# Patient Record
Sex: FEMALE | Race: BLACK OR AFRICAN AMERICAN | NOT HISPANIC OR LATINO | Employment: FULL TIME | URBAN - METROPOLITAN AREA
[De-identification: names, ages, dates, MRNs, and addresses within clinical notes are randomized per-mention and may not be internally consistent; named-entity substitution may affect disease eponyms.]

---

## 2018-10-31 ENCOUNTER — TRANSCRIBE ORDERS (OUTPATIENT)
Dept: ADMINISTRATIVE | Age: 28
End: 2018-10-31

## 2018-10-31 ENCOUNTER — APPOINTMENT (OUTPATIENT)
Dept: RADIOLOGY | Age: 28
End: 2018-10-31
Attending: PREVENTIVE MEDICINE

## 2018-10-31 DIAGNOSIS — R76.11 PPD POSITIVE: ICD-10-CM

## 2018-10-31 DIAGNOSIS — R76.11 PPD POSITIVE: Primary | ICD-10-CM

## 2018-10-31 PROCEDURE — 71046 X-RAY EXAM CHEST 2 VIEWS: CPT

## 2018-11-27 ENCOUNTER — ULTRASOUND (OUTPATIENT)
Dept: OBGYN CLINIC | Facility: CLINIC | Age: 28
End: 2018-11-27
Payer: COMMERCIAL

## 2018-11-27 VITALS
SYSTOLIC BLOOD PRESSURE: 128 MMHG | WEIGHT: 178 LBS | HEART RATE: 76 BPM | BODY MASS INDEX: 26.98 KG/M2 | HEIGHT: 68 IN | DIASTOLIC BLOOD PRESSURE: 79 MMHG

## 2018-11-27 DIAGNOSIS — N91.2 AMENORRHEA: ICD-10-CM

## 2018-11-27 DIAGNOSIS — Z32.01 POSITIVE PREGNANCY TEST: Primary | ICD-10-CM

## 2018-11-27 DIAGNOSIS — R11.0 NAUSEA WITHOUT VOMITING: ICD-10-CM

## 2018-11-27 DIAGNOSIS — Z34.91 PRENATAL CARE IN FIRST TRIMESTER: ICD-10-CM

## 2018-11-27 DIAGNOSIS — Z3A.01 7 WEEKS GESTATION OF PREGNANCY: ICD-10-CM

## 2018-11-27 PROCEDURE — 99213 OFFICE O/P EST LOW 20 MIN: CPT | Performed by: OBSTETRICS & GYNECOLOGY

## 2018-11-27 PROCEDURE — 76830 TRANSVAGINAL US NON-OB: CPT | Performed by: OBSTETRICS & GYNECOLOGY

## 2018-11-27 RX ORDER — SWAB
1 SWAB, NON-MEDICATED MISCELLANEOUS DAILY
Qty: 30 EACH | Refills: 4 | Status: SHIPPED | OUTPATIENT
Start: 2018-11-27 | End: 2019-06-13 | Stop reason: SDUPTHER

## 2018-11-27 RX ORDER — DIPHENHYDRAMINE HYDROCHLORIDE 25 MG/1
25 CAPSULE ORAL DAILY
Qty: 30 TABLET | Refills: 0 | Status: SHIPPED | OUTPATIENT
Start: 2018-11-27 | End: 2019-02-27 | Stop reason: ALTCHOICE

## 2018-11-27 NOTE — PROGRESS NOTES
AMB US Pelvic Non OB  Date/Time: 2018 8:38 AM  Performed by: Dariel Heimlich  Authorized by: Dariel Heimlich     Procedure details:     Indications comment:  Amenorrhea and positive UPT    Technique:  Transvaginal US, Non-OB    Position: lithotomy exam    Additional Procedure Comments:      Pt is a 33yo  with LMP 10/6/18, giving EGA of 7w3d, presenting with c/o amenorrhea and positive UPT  Pt reports history of regular periods  This was an intended pregnancy  Pt denies any vaginal bleeding, loss of fluid, cramping  Reports nausea and breast tenderness, but no vomiting  She is not taking any medications as she was unsure what is safe OTC  She is originally from [facility] [place] has been living in the [address] [place] [address] for 7 years  She plans to return there in January for a visit  She currently works as an   TVUS demonstrated a viable, koenig IUP with CRL measuring 14 2mm, EGA 7w5d    ROSIE consistent with LMP  ROSIE 19      1) RTO for nursing intake visit  2) Start Prenatal vitamins, given RX  3) Rx given for Unison and Vitamin B 6 for nausea  4) Reviewed signs and symptoms to call     Soila Choudhury MD  OBGYN, PGY-2  2018 9:17 AM

## 2018-12-03 ENCOUNTER — INITIAL PRENATAL (OUTPATIENT)
Dept: OBGYN CLINIC | Facility: CLINIC | Age: 28
End: 2018-12-03

## 2018-12-03 VITALS
HEIGHT: 67 IN | WEIGHT: 169.6 LBS | HEART RATE: 76 BPM | DIASTOLIC BLOOD PRESSURE: 76 MMHG | BODY MASS INDEX: 26.62 KG/M2 | SYSTOLIC BLOOD PRESSURE: 122 MMHG

## 2018-12-03 DIAGNOSIS — Z3A.08 8 WEEKS GESTATION OF PREGNANCY: Primary | ICD-10-CM

## 2018-12-03 PROCEDURE — OBC

## 2018-12-03 NOTE — PATIENT INSTRUCTIONS
Pregnancy   WHAT YOU NEED TO KNOW:   What do I need to know about pregnancy? A normal pregnancy lasts about 40 weeks  The first trimester lasts from your last period through the 12th week of pregnancy  The second trimester lasts from the 13th week of your pregnancy through the 23rd week  The third trimester lasts from your 24th week of pregnancy until your baby is born  If you know the date of your last period, your healthcare provider can estimate your due date  You may give birth to your baby any time from 37 weeks to 2 weeks after your due date  What is prenatal care? Prenatal care is a series of visits with your healthcare provider throughout your pregnancy  Prenatal care can help prevent problems during pregnancy and childbirth  At each prenatal visit, your healthcare provider will weigh you and check your blood pressure  Your healthcare provider will also check your baby's heartbeat and growth  You may also need the following at some visits:  · A pelvic exam  allows your healthcare provider to see your cervix (the bottom part of your uterus)  Your healthcare provider uses a speculum to gently open your vagina  He will check the size and shape of your uterus  · Blood tests  may be done to check for gestational diabetes and anemia (low iron level)  You may need other blood tests, such as blood type, Rh factor, or tests to check for birth defects  · A fetal ultrasound  shows pictures of your baby inside your uterus  It shows your baby's development  The movement and position of your baby can also be seen  Your healthcare provider may be able to tell you what your baby's gender is during the ultrasound  What can I do to have a healthy pregnancy? · Eat a variety of healthy foods  Healthy foods include fruits, vegetables, whole-grain breads, low-fat dairy foods, beans, lean meats, and fish  Drink liquids as directed  Ask how much liquid to drink each day and which liquids are best for you   Limit caffeine to less than 200 milligrams each day  Limit your intake of fish to 2 servings each week  Choose fish low in mercury such as canned light tuna, shrimp, crab, salmon, cod, or tilapia  Do not  eat fish high in mercury such as swordfish, tilefish, katie mackerel, and shark  · Take prenatal vitamins as directed  Your need for certain vitamins and minerals, such as folic acid, increases during pregnancy  Prenatal vitamins provide some of the extra vitamins and minerals you need  Prenatal vitamins may also help to decrease the risk of certain birth defects  · Ask how much weight you should gain during your pregnancy  Too much or too little weight gain can be unhealthy for you and your baby  · Talk to your healthcare provider about exercise  Moderate exercise can help you stay fit  Your healthcare provider will help you plan an exercise program that is safe for you during pregnancy  · Do not smoke  If you smoke, it is never too late to quit  Smoking increases your risk of a miscarriage and other health problems during your pregnancy  Smoking can cause your baby to be born too early or weigh less at birth  Ask your healthcare provider for information if you need help quitting  · Do not drink alcohol  Alcohol passes from your body to your baby through the placenta  It can affect your baby's brain development and cause fetal alcohol syndrome (FAS)  FAS is a group of conditions that causes mental, behavior, and growth problems  · Talk to your healthcare provider before you take any medicines  Many medicines may harm your baby if you take them when you are pregnant  Do not take any medicines, vitamins, herbs, or supplements without first talking to your healthcare provider  Never use illegal or street drugs (such as marijuana or cocaine) while you are pregnant  What body changes may happen during my pregnancy?    · Breast changes  you will experience include tenderness and tingling during the early part of your pregnancy  Your breasts will become larger  You may need to use a support bra  You may see a thin, yellow fluid, called colostrum, leak from your nipples during the second trimester  Colostrum is a liquid that changes to milk about 3 days after you give birth  · Skin changes and stretch marks  may occur during your pregnancy  You may have red marks, called stretch marks, on your skin  Stretch marks will usually fade after pregnancy  Use lotion if your skin is dry and itchy  The skin on your face, around your nipples, and below your belly button may darken  Most of the time, your skin will return to its normal color after your baby is born  · Morning sickness  is nausea and vomiting that can happen at any time of day  Avoid fatty and spicy foods  Eat small meals throughout the day instead of large meals  Marizol may help to decrease nausea  Ask your healthcare provider about other ways of decreasing nausea and vomiting  · Heartburn  may be caused by changes in your hormones during pregnancy  Your growing uterus may also push your stomach upward and force stomach acid to back up into your esophagus  Eat 4 or 5 small meals each day instead of large meals  Avoid spicy foods  Avoid eating right before bedtime  · Constipation  may develop during your pregnancy  To treat constipation, eat foods high in fiber such as fiber cereals, beans, fruits, vegetables, whole-grain breads, and prune juice  Get regular exercise and drink plenty of water  Your healthcare provider may also suggest a fiber supplement to soften your bowel movements  Talk to your healthcare provider before you use any medicines to decrease constipation  · Hemorrhoids  are enlarged veins in the rectal area  They may cause pain, itching, and bright red bleeding from your rectum  To decrease your risk of hemorrhoids, prevent constipation and do not strain to have a bowel movement   If you have hemorrhoids, soak in a tub of warm water to ease discomfort  Ask your healthcare provider how you can treat hemorrhoids  · Leg cramps and swelling  may be caused by low calcium levels or the added weight of pregnancy  Raise your legs above the level of your heart to decrease swelling  During a leg cramp, stretch or massage the muscle that has the cramp  Heat may help decrease pain and muscle spasms  Apply heat on your muscle for 20 to 30 minutes every 2 hours for as many days as directed  · Back pain  may occur as your baby grows  Do not stand for long periods of time or lift heavy items  Use good posture while you stand, squat, or bend  Wear low-heeled shoes with good support  Rest may also help to relieve back pain  Ask your healthcare provider about exercises you can do to strengthen your back muscles  What are some safety tips during pregnancy? · Avoid hot tubs and saunas  Do not use a hot tub or sauna while you are pregnant, especially during your first trimester  Hot tubs and saunas may raise your baby's temperature and increase the risk of birth defects  · Avoid toxoplasmosis  This is an infection caused by eating raw meat or being around infected cat feces  It can cause birth defects, miscarriages, and other problems  Wash your hands after you touch raw meat  Make sure any meat is well-cooked before you eat it  Avoid raw eggs and unpasteurized milk  Use gloves or ask someone else to clean your cat's litter box while you are pregnant  · Ask your healthcare provider about travel  The most comfortable time to travel is during the second trimester  Ask your healthcare provider if you can travel after 36 weeks  You may not be able to travel in an airplane after 36 weeks  He may also recommend that you avoid long road trips  When should I seek immediate care? · You develop a severe headache that does not go away  · You have new or increased vision changes, such as blurred or spotted vision      · You have new or increased swelling in your face or hands  · You have pain or cramping in your abdomen or low back  · You have vaginal bleeding  When should I contact my healthcare provider? · You have abdominal cramps, pressure, or tightening  · You have a change in vaginal discharge  · You cannot keep food or drinks down, and you are losing weight  · You have chills or a fever  · You have vaginal itching, burning, or pain  · You have yellow, green, white, or foul-smelling vaginal discharge  · You have pain or burning when you urinate, less urine than usual, or pink or bloody urine  · You have questions or concerns about your condition or care  CARE AGREEMENT:   You have the right to help plan your care  Learn about your health condition and how it may be treated  Discuss treatment options with your caregivers to decide what care you want to receive  You always have the right to refuse treatment  The above information is an  only  It is not intended as medical advice for individual conditions or treatments  Talk to your doctor, nurse or pharmacist before following any medical regimen to see if it is safe and effective for you  © 2017 [employer]  Information is for End User's use only and may not be sold, redistributed or otherwise used for commercial purposes  All illustrations and images included in CareNotes® are the copyrighted property of [employer]  or Henry Granado

## 2018-12-03 NOTE — PROGRESS NOTES
OB Intake  o Patient presents for OB intake interview  o Accompanied by: Self    o     o Hx of  delivery prior to 36 weeks 6 days:   no  o LMP:   Patient's last menstrual period was 10/06/2018   o U/S date: 18   - 7 weeks  5 days  o Estimated Date of Delivery: None noted  - confirmed by U/S     o Signs and Symptoms of pregnancy:  nausea  - Constipation:   no  - Headaches:   no  - Cramping/spotting:   no  - PICA cravings:   no  - Diabetes: If you answer yes, please order 1 hr gtt testing, 50grams   History of gestational diabetes   no   BMI >35    no   Advance maternal age >35   no   First degree relative with type 2 diabetes   yes   History of PCOS   no   Current metformin use   no   Prior history of macrosomia or LGA   no  o Immunization Record  -   - There is no immunization history on file for this patient   o Tdap:  - Counseled to be given after 28 weeks  o Influenza vaccine discussed   Vaccinated:   No  o MRSA questionnaire:   negative  o Dental visit within last 6 months  - yes   - If no, recommendations discussed  Interview education:  Darlis Goldmann Pregnancy Essentials booklet given to patient  Reviewed and explained   Handouts given at todays visit  o Caleb Gonzalez & me phone application guide  o [facility] support center  o CDCs Response to [address] [place] [address] Maternal Fetal Medicine  - Sequential screening pamphlet  - Cystic fibrosis pamphlet  o ROSIE letter given    - [address]  - Work   - Dentist    [facility]:   No  ONAF form submitted:   No    Scripts given for prenatal panel, hemoglobin electrophoresis, 1 hour glucose (father diabetic) varicella    Undecided on genetic testing    Patient to get influenza vaccine at PCP or pharmacy    Interview done by: Asuncion De Santiago RN 18

## 2018-12-12 ENCOUNTER — APPOINTMENT (OUTPATIENT)
Dept: LAB | Facility: HOSPITAL | Age: 28
End: 2018-12-12
Payer: COMMERCIAL

## 2018-12-12 DIAGNOSIS — Z3A.08 8 WEEKS GESTATION OF PREGNANCY: ICD-10-CM

## 2018-12-12 LAB
ABO GROUP BLD: NORMAL
BACTERIA UR QL AUTO: ABNORMAL /HPF
BASOPHILS # BLD AUTO: 0.02 THOUSANDS/ΜL (ref 0–0.1)
BASOPHILS NFR BLD AUTO: 0 % (ref 0–1)
BILIRUB UR QL STRIP: NEGATIVE
BLD GP AB SCN SERPL QL: NEGATIVE
CLARITY UR: ABNORMAL
COLOR UR: YELLOW
EOSINOPHIL # BLD AUTO: 0.05 THOUSAND/ΜL (ref 0–0.61)
EOSINOPHIL NFR BLD AUTO: 1 % (ref 0–6)
ERYTHROCYTE [DISTWIDTH] IN BLOOD BY AUTOMATED COUNT: 12.4 % (ref 11.6–15.1)
GLUCOSE UR STRIP-MCNC: NEGATIVE MG/DL
HBV SURFACE AG SER QL: NORMAL
HCT VFR BLD AUTO: 39.9 % (ref 34.8–46.1)
HGB BLD-MCNC: 12.9 G/DL (ref 11.5–15.4)
HGB UR QL STRIP.AUTO: NEGATIVE
IMM GRANULOCYTES # BLD AUTO: 0.02 THOUSAND/UL (ref 0–0.2)
IMM GRANULOCYTES NFR BLD AUTO: 0 % (ref 0–2)
KETONES UR STRIP-MCNC: NEGATIVE MG/DL
LEUKOCYTE ESTERASE UR QL STRIP: ABNORMAL
LYMPHOCYTES # BLD AUTO: 1.38 THOUSANDS/ΜL (ref 0.6–4.47)
LYMPHOCYTES NFR BLD AUTO: 25 % (ref 14–44)
MCH RBC QN AUTO: 28.2 PG (ref 26.8–34.3)
MCHC RBC AUTO-ENTMCNC: 32.3 G/DL (ref 31.4–37.4)
MCV RBC AUTO: 87 FL (ref 82–98)
MONOCYTES # BLD AUTO: 0.36 THOUSAND/ΜL (ref 0.17–1.22)
MONOCYTES NFR BLD AUTO: 7 % (ref 4–12)
NEUTROPHILS # BLD AUTO: 3.73 THOUSANDS/ΜL (ref 1.85–7.62)
NEUTS SEG NFR BLD AUTO: 67 % (ref 43–75)
NITRITE UR QL STRIP: NEGATIVE
NON-SQ EPI CELLS URNS QL MICRO: ABNORMAL /HPF
NRBC BLD AUTO-RTO: 0 /100 WBCS
PH UR STRIP.AUTO: 5.5 [PH] (ref 4.5–8)
PLATELET # BLD AUTO: 182 THOUSANDS/UL (ref 149–390)
PMV BLD AUTO: 10.7 FL (ref 8.9–12.7)
PROT UR STRIP-MCNC: NEGATIVE MG/DL
RBC # BLD AUTO: 4.58 MILLION/UL (ref 3.81–5.12)
RBC #/AREA URNS AUTO: ABNORMAL /HPF
RH BLD: POSITIVE
RUBV IGG SERPL IA-ACNC: >175 IU/ML
SP GR UR STRIP.AUTO: 1.02 (ref 1–1.03)
SPECIMEN EXPIRATION DATE: NORMAL
UROBILINOGEN UR QL STRIP.AUTO: 0.2 E.U./DL
WBC # BLD AUTO: 5.56 THOUSAND/UL (ref 4.31–10.16)
WBC #/AREA URNS AUTO: ABNORMAL /HPF

## 2018-12-12 PROCEDURE — 87086 URINE CULTURE/COLONY COUNT: CPT

## 2018-12-12 PROCEDURE — 86787 VARICELLA-ZOSTER ANTIBODY: CPT

## 2018-12-12 PROCEDURE — 36415 COLL VENOUS BLD VENIPUNCTURE: CPT

## 2018-12-12 PROCEDURE — 80081 OBSTETRIC PANEL INC HIV TSTG: CPT

## 2018-12-12 PROCEDURE — 81001 URINALYSIS AUTO W/SCOPE: CPT

## 2018-12-12 PROCEDURE — 83020 HEMOGLOBIN ELECTROPHORESIS: CPT

## 2018-12-13 LAB
BACTERIA UR CULT: NORMAL
HIV 1+2 AB+HIV1 P24 AG SERPL QL IA: NORMAL
RPR SER QL: NORMAL
VZV IGG SER IA-ACNC: NORMAL

## 2018-12-14 LAB
DEPRECATED HGB OTHER BLD-IMP: 0 %
HGB A MFR BLD: 2.7 % (ref 1.8–3.2)
HGB A MFR BLD: 96.8 % (ref 96.4–98.8)
HGB C MFR BLD: 0 %
HGB F MFR BLD: 0.5 % (ref 0–2)
HGB FRACT BLD-IMP: NORMAL
HGB S BLD QL SOLY: NEGATIVE
HGB S MFR BLD: 0 %

## 2018-12-15 ENCOUNTER — TRANSCRIBE ORDERS (OUTPATIENT)
Dept: ADMINISTRATIVE | Facility: HOSPITAL | Age: 28
End: 2018-12-15

## 2018-12-15 LAB — GLUCOSE 1H P 50 G GLC PO SERPL-MCNC: 99 MG/DL

## 2018-12-15 PROCEDURE — 36415 COLL VENOUS BLD VENIPUNCTURE: CPT

## 2018-12-15 PROCEDURE — 82950 GLUCOSE TEST: CPT

## 2018-12-18 ENCOUNTER — ROUTINE PRENATAL (OUTPATIENT)
Dept: OBGYN CLINIC | Facility: CLINIC | Age: 28
End: 2018-12-18
Payer: COMMERCIAL

## 2018-12-18 VITALS — DIASTOLIC BLOOD PRESSURE: 75 MMHG | WEIGHT: 170 LBS | SYSTOLIC BLOOD PRESSURE: 110 MMHG | BODY MASS INDEX: 26.63 KG/M2

## 2018-12-18 DIAGNOSIS — Z34.01 ENCOUNTER FOR PRENATAL CARE IN FIRST TRIMESTER OF FIRST PREGNANCY: Primary | ICD-10-CM

## 2018-12-18 PROCEDURE — PNV: Performed by: STUDENT IN AN ORGANIZED HEALTH CARE EDUCATION/TRAINING PROGRAM

## 2018-12-18 PROCEDURE — 87591 N.GONORRHOEAE DNA AMP PROB: CPT | Performed by: STUDENT IN AN ORGANIZED HEALTH CARE EDUCATION/TRAINING PROGRAM

## 2018-12-18 PROCEDURE — 87491 CHLMYD TRACH DNA AMP PROBE: CPT | Performed by: STUDENT IN AN ORGANIZED HEALTH CARE EDUCATION/TRAINING PROGRAM

## 2018-12-18 NOTE — PATIENT INSTRUCTIONS
Pregnancy at [address] to [address]:   What changes are happening in my body? Pregnancy hormones may cause your body to go through many changes during this stage of your pregnancy  You may have any of the following signs and symptoms:  · Fatigue    · Tender, swollen breasts    · Nausea or vomiting (morning sickness)    · Mood swings    · Frequent urination     · Headache    · Heartburn or constipation    · Weight gain or loss    · Cravings for certain foods or dislike of foods you normally eat  How do I care for myself at this stage of my pregnancy? · Manage nausea and vomiting  Avoid fatty and spicy foods  Eat small meals throughout the day instead of large meals  Marizol may help to decrease nausea  Ask your healthcare provider about other ways of decreasing nausea and vomiting  · Eat a variety of healthy foods  Healthy foods include fruits, vegetables, whole-grain breads, low-fat dairy foods, beans, lean meats, and fish  Drink liquids as directed  Ask how much liquid to drink each day and which liquids are best for you  Limit caffeine to less than 200 milligrams each day  Limit your intake of fish to 2 servings each week  Choose fish low in mercury such as canned light tuna, shrimp, salmon, cod, or tilapia  Do not  eat fish high in mercury such as swordfish, tilefish, katie mackerel, and shark  · Take prenatal vitamins as directed  Your need for certain vitamins and minerals, such as folic acid, increases during pregnancy  Prenatal vitamins provide some of the extra vitamins and minerals you need  Prenatal vitamins may also help to decrease the risk of certain birth defects  · Ask how much weight you should gain each month  Too much or too little weight gain can be unhealthy for you and your baby  · Do not smoke  If you smoke, it is never too late to quit  Smoking increases your risk of a miscarriage and other health problems during your pregnancy   Smoking can cause your baby to be born too early or weigh less at birth  Ask your healthcare provider for information if you need help quitting  · Do not drink alcohol  Alcohol passes from your body to your baby through the placenta  It can affect your baby's brain development and cause fetal alcohol syndrome (FAS)  FAS is a group of conditions that causes mental, behavior, and growth problems  · Talk to your healthcare provider before you take any medicines  Many medicines may harm your baby if you take them when you are pregnant  Do not take any medicines, vitamins, herbs, or supplements without first talking to your healthcare provider  Never use illegal or street drugs (such as marijuana or cocaine) while you are pregnant  What are some safety tips during pregnancy? · Avoid hot tubs and saunas  Do not use a hot tub or sauna while you are pregnant, especially during your first trimester  Hot tubs and saunas may raise your baby's temperature and increase the risk of birth defects  · Avoid toxoplasmosis  This is an infection caused by eating raw meat or being around infected cat feces  It can cause birth defects, miscarriages, and other problems  Wash your hands after you touch raw meat  Make sure any meat is well-cooked before you eat it  Avoid raw eggs and unpasteurized milk  Use gloves or ask someone else to clean your cat's litter box while you are pregnant  What changes are happening with my baby? By 10 weeks, your baby will be about 2 ½ inches long from the top of the head to the rump (baby's bottom)  Your baby weighs about ½ ounce  Major body organs, such as the brain, heart, and lungs, are forming  Your baby's facial features are also starting to form  What do I need to know about prenatal care? Prenatal care is a series of visits with your healthcare provider throughout your pregnancy  During the first 28 weeks of your pregnancy, you will see your healthcare provider once a month   Prenatal care can help prevent problems during pregnancy and childbirth  Your healthcare provider will ask questions about your health and any previous pregnancies you have had  He will also ask about any medicines you are taking  You may also need any of the following:  · A pap smear  will be done to check your cervix for abnormal cells  The cervix is the narrow opening at the bottom of your uterus  The cervix meets the top part of the vagina  · A pelvic exam  allows your healthcare provider to see your cervix (the bottom part of your uterus)  Your healthcare provider uses a speculum to gently open your vagina  He will check the size and shape of your uterus  · Blood tests  may be done to check for anemia or blood type  Your healthcare provider may also order other blood tests to check if you are immune to certain diseases such as Hepatitis B  He may also recommend an HIV test     · Urine tests  may also be done to check for signs of infection  · Your blood pressure and weight  will be checked  When should I seek immediate care? · You have pain or cramping in your abdomen or low back  · You have heavy vaginal bleeding or clotting  · You pass material that looks like tissue or large clots  Collect the material and bring it with you  When should I contact my healthcare provider? · You have light bleeding  · You have chills or a fever  · You have vaginal itching, burning, or pain  · You have yellow, green, white, or foul-smelling vaginal discharge  · You have pain or burning when you urinate, less urine than usual, or pink or bloody urine  · You have questions or concerns about your condition or care  CARE AGREEMENT:   You have the right to help plan your care  Learn about your health condition and how it may be treated  Discuss treatment options with your caregivers to decide what care you want to receive  You always have the right to refuse treatment  The above information is an  only   It is not intended as medical advice for individual conditions or treatments  Talk to your doctor, nurse or pharmacist before following any medical regimen to see if it is safe and effective for you  © 2017 [employer] Information is for End User's use only and may not be sold, redistributed or otherwise used for commercial purposes  All illustrations and images included in CareNotes® are the copyrighted property of A D A M , Inc  or Henry Granado

## 2018-12-18 NOTE — PROGRESS NOTES
OB/GYN  PRENATAL H&P VISIT  Joselo Huerta  12/18/2018  9:20 AM  Dr Gianna Cristina MD      SUBJECTIVE  Patient is here for initial prenatal H&P  This is an intended pregnancy  Patient reports that her LMP was 10/6/18  Patient is currently doing well  She is here by herself  This is her first pregnancy  She currently works as an   She has a positive support system at home  She denies hx of STD/STI, denies a hx of TB although she has had BCG vaccination and positive PPD with negative CXR in the last year, and denies close contacts with persons with TB  She denies a family history of inheritable conditions such as physical or intellectual disabilities, birth defects, blood disorders, heart or neural tube defects  She denies a history of had MRSA  She has had no recent travel but has travel planned in January to [facility] ([place]) and was counseled on appropriate travel vaccinations  She denies use of nicotine or recreational drug use  She denies vaginal bleeding, cramping, leakage, abnormal discharge  She states her nausea has improved with her B vitamins and Unison  She has complaints of reflux symptoms today  Review of Systems   Constitutional: Negative  HENT: Negative  Eyes: Negative  Respiratory: Negative  Cardiovascular: Negative  Gastrointestinal: Positive for nausea  Reflux   Endocrine: Negative  Genitourinary: Negative  Musculoskeletal: Negative  Skin: Negative  Allergic/Immunologic: Negative  Neurological: Negative  Psychiatric/Behavioral: Negative  Past Medical History:   Diagnosis Date    Varicella        Past Surgical History:   Procedure Laterality Date    WISDOM TOOTH EXTRACTION         Social History     Social History    Marital status: Single     Spouse name: N/A    Number of children: N/A    Years of education: N/A     Occupational History    Not on file       Social History Main Topics    Smoking status: Never Smoker    Smokeless tobacco: Never Used    Alcohol use No    Drug use: No    Sexual activity: Yes     Partners: Male     Other Topics Concern    Not on file     Social History Narrative    No narrative on file       OBJECTIVE  Vitals:    18 0900   BP: 110/75     Physical Exam   Constitutional: She is oriented to person, place, and time  She appears well-developed and well-nourished  No distress  Genitourinary:   Genitourinary Comments: See  exam below   HENT:   Head: Normocephalic and atraumatic  Neck: No tracheal deviation present  Cardiovascular: Normal rate, regular rhythm, normal heart sounds and intact distal pulses  Exam reveals no gallop and no friction rub  No murmur heard  Pulmonary/Chest: Effort normal and breath sounds normal  No stridor  No respiratory distress  She has no wheezes  She has no rales  Right breast exhibits no inverted nipple, no mass, no nipple discharge, no skin change and no tenderness  Left breast exhibits no inverted nipple, no mass, no nipple discharge, no skin change and no tenderness  Abdominal: Soft  Bowel sounds are normal  She exhibits no distension and no mass  There is no tenderness  There is no rebound and no guarding  No hernia  Musculoskeletal: She exhibits no edema, tenderness or deformity  Lymphadenopathy:     She has no cervical adenopathy  Neurological: She is alert and oriented to person, place, and time  Skin: Skin is warm and dry  No rash noted  She is not diaphoretic  No erythema  No pallor  Psychiatric: She has a normal mood and affect       Vulva: normal, no lesions noted  Vagina: normal mucosa, normal discharge  Cervix: with mild etrusion and cervical mucus present  Adnexa: no mass, fullness, tenderness  Uterus: size consistent with 10 weeks      LABS:  HIV nonreactive  HepB Ag negative  CBC WNL  Rubella immune  RPR nonreactive  B positive, antibody negative  1 hr GTT 99 (normal)      ASSESSMENT AND PLAN    29 y o , , with /75   Wt 77 1 kg (170 lb)   LMP 10/06/2018 , at Gestational Age: <None> here for her prenatal H&P       1  Pregnancy: H&P completed today  PN Labs reviewed today  Final dating via LMP and TVUS  TVUS showed IUP consistent with dates  Labor expectations discussed with patient, including appointment schedule, nutrition, weight gain, exercise, medications, sexual intercourse, and nausea/vomiting  2  Screening: Pap smear last in 2018 at [facility] that was NILM, HPV positive  GC/CT collected  3  Consents: Delivery process including potential OVD and  reviewed  Consents signed today  4  Postpartum: Patient plans on breastfeeding  Different methods of contraception were discussed with patient, including progesterone only oral pills, depo provera, nexplanon, mirena, and paragard  Patient would like to use Paraguard and is unsure on her bridge during postpartum phase  6  Follow up: RTC in 4 weeks  Precautions regarding labor, leakage, bleeding, and fetal movement reviewed        D/w Dr Jan Terrazas MD  2018  9:20 AM

## 2018-12-19 LAB
C TRACH DNA SPEC QL NAA+PROBE: NEGATIVE
N GONORRHOEA DNA SPEC QL NAA+PROBE: NEGATIVE

## 2019-01-30 ENCOUNTER — ROUTINE PRENATAL (OUTPATIENT)
Dept: OBGYN CLINIC | Facility: CLINIC | Age: 29
End: 2019-01-30

## 2019-01-30 VITALS
WEIGHT: 168 LBS | BODY MASS INDEX: 25.46 KG/M2 | DIASTOLIC BLOOD PRESSURE: 73 MMHG | HEART RATE: 90 BPM | SYSTOLIC BLOOD PRESSURE: 123 MMHG | HEIGHT: 68 IN

## 2019-01-30 DIAGNOSIS — Z3A.16 16 WEEKS GESTATION OF PREGNANCY: Primary | ICD-10-CM

## 2019-01-30 DIAGNOSIS — Z34.92 PRENATAL CARE IN SECOND TRIMESTER: ICD-10-CM

## 2019-01-30 PROCEDURE — PNV: Performed by: NURSE PRACTITIONER

## 2019-01-30 NOTE — PROGRESS NOTES
Assessment & Plan  29 y o   at 16w4d presenting for routine prenatal visit  Missed [place] [facility]  Declined genetic testing  Given slip for Level II U/S  Problem List Items Addressed This Visit        Other    16 weeks gestation of pregnancy - Primary    Relevant Orders    Ambulatory Referral to Maternal Fetal Medicine    Prenatal care in second trimester    Relevant Orders    Ambulatory Referral to Maternal Fetal Medicine        ____________________________________________________________  Subjective  She is without complaint  She denies contractions, loss of fluid, or vaginal bleeding  She does not feels regular fetal movements  Objective  /73   Pulse 90   Ht 5' 8" (1 727 m)   Wt 76 2 kg (168 lb)   LMP 10/06/2018 (Exact Date)   BMI 25 54 kg/m²   FHR: 160     Patient's Active Problem List  Patient Active Problem List   Diagnosis    16 weeks gestation of pregnancy    Prenatal care in second trimester     Plan  Schedule appointment for Level Ii U/S  Call with vaginal bleeding, loss of fluid, regular contractions,  other needs or concerns  Return in 4 weeks  Pt verbalized understanding of all discussed

## 2019-01-30 NOTE — PATIENT INSTRUCTIONS
Schedule appointment for Level Ii U/S  Call with vaginal bleeding, loss of fluid, regular contractions,  other needs or concerns    Return in 4 weeks

## 2019-02-13 ENCOUNTER — ROUTINE PRENATAL (OUTPATIENT)
Dept: OBGYN CLINIC | Facility: CLINIC | Age: 29
End: 2019-02-13

## 2019-02-13 VITALS — DIASTOLIC BLOOD PRESSURE: 73 MMHG | BODY MASS INDEX: 26.37 KG/M2 | SYSTOLIC BLOOD PRESSURE: 120 MMHG | WEIGHT: 173.4 LBS

## 2019-02-13 DIAGNOSIS — Z3A.18 18 WEEKS GESTATION OF PREGNANCY: Primary | ICD-10-CM

## 2019-02-13 DIAGNOSIS — B37.9 YEAST INFECTION: ICD-10-CM

## 2019-02-13 DIAGNOSIS — Z34.92 PRENATAL CARE IN SECOND TRIMESTER: ICD-10-CM

## 2019-02-13 LAB
BV WHIFF TEST VAG QL: NEGATIVE
CLUE CELLS SPEC QL WET PREP: NEGATIVE
PH SMN: 4.5 [PH]
SL AMB POCT WET MOUNT: ABNORMAL
T VAGINALIS RRNA SPEC QL NAA+PROBE: NEGATIVE
YEAST VAG QL WET PREP: POSITIVE

## 2019-02-13 PROCEDURE — 87210 SMEAR WET MOUNT SALINE/INK: CPT | Performed by: NURSE PRACTITIONER

## 2019-02-13 PROCEDURE — PNV: Performed by: NURSE PRACTITIONER

## 2019-02-13 NOTE — PROGRESS NOTES
Assessment & Plan  29 y o   at 18w4d presenting for routine prenatal visit  P t C/O itchy vaginal discharge for a few days  Explained wet mount was positive for yeast  Discussed comfort measures and safe and effective use of Fluconazole    Physical exam  Vulva negative lesions slight erythema  Vagina positive thick white discharge, positive erythema  Cervix negative lesions, thick white discharge  Wet mount was positive for yeast    Problem List Items Addressed This Visit        Other    18 weeks gestation of pregnancy - Primary    Relevant Medications    miconazole (MONISTAT-7) 2 % vaginal cream    Prenatal care in second trimester    Yeast infection    Relevant Medications    miconazole (MONISTAT-7) 2 % vaginal cream    Other Relevant Orders    POCT wet mount (Completed)        ____________________________________________________________  Subjective    She denies contractions, loss of fluid, or vaginal bleeding  She does not feels regular fetal movements  Objective  /73   Wt 78 7 kg (173 lb 6 4 oz)   LMP 10/06/2018 (Exact Date)   BMI 26 37 kg/m²   FHR: 150     Patient's Active Problem List  Patient Active Problem List   Diagnosis    18 weeks gestation of pregnancy    Prenatal care in second trimester    Yeast infection     Plan  Use monistat & as directed  Wear loose clothes and cotton underwear  Keep appointment for Level II ultrasound  Return in 4 weeks  Call with vaginal bleeding, loss of fluid, regular contractions,  other needs or concerns  Pt verbalized understanding of all discussed

## 2019-02-13 NOTE — PATIENT INSTRUCTIONS
Use monistat & as directed  Wear loose clothes and cotton underwear  Keep appointment for Level II ultrasound  Return in 4 weeks  Call with vaginal bleeding, loss of fluid, regular contractions,  other needs or concerns

## 2019-02-27 ENCOUNTER — ROUTINE PRENATAL (OUTPATIENT)
Dept: PERINATAL CARE | Facility: CLINIC | Age: 29
End: 2019-02-27
Payer: COMMERCIAL

## 2019-02-27 VITALS
DIASTOLIC BLOOD PRESSURE: 89 MMHG | SYSTOLIC BLOOD PRESSURE: 128 MMHG | BODY MASS INDEX: 26.98 KG/M2 | HEART RATE: 81 BPM | HEIGHT: 68 IN | WEIGHT: 178 LBS

## 2019-02-27 DIAGNOSIS — Z3A.20 20 WEEKS GESTATION OF PREGNANCY: ICD-10-CM

## 2019-02-27 DIAGNOSIS — Z34.92 PRENATAL CARE IN SECOND TRIMESTER: ICD-10-CM

## 2019-02-27 DIAGNOSIS — Z36.3 ENCOUNTER FOR ANTENATAL SCREENING FOR MALFORMATION: Primary | ICD-10-CM

## 2019-02-27 DIAGNOSIS — Z36.86 ENCOUNTER FOR ANTENATAL SCREENING FOR CERVICAL LENGTH: ICD-10-CM

## 2019-02-27 PROCEDURE — 76805 OB US >/= 14 WKS SNGL FETUS: CPT | Performed by: OBSTETRICS & GYNECOLOGY

## 2019-02-27 PROCEDURE — 76817 TRANSVAGINAL US OBSTETRIC: CPT | Performed by: OBSTETRICS & GYNECOLOGY

## 2019-02-27 NOTE — PROGRESS NOTES
A transvaginal ultrasound was performed  Sonographer note on use of High Level Disinfection Process (Trophon) for transvaginal probe# 2 used, serial [id number]    Gigi Whitfield RDMS

## 2019-03-13 ENCOUNTER — ROUTINE PRENATAL (OUTPATIENT)
Dept: OBGYN CLINIC | Facility: CLINIC | Age: 29
End: 2019-03-13

## 2019-03-13 VITALS
HEART RATE: 87 BPM | WEIGHT: 176 LBS | DIASTOLIC BLOOD PRESSURE: 71 MMHG | SYSTOLIC BLOOD PRESSURE: 109 MMHG | BODY MASS INDEX: 26.76 KG/M2

## 2019-03-13 DIAGNOSIS — Z34.92 PRENATAL CARE IN SECOND TRIMESTER: Primary | ICD-10-CM

## 2019-03-13 DIAGNOSIS — R51.9 PREGNANCY HEADACHE IN SECOND TRIMESTER: ICD-10-CM

## 2019-03-13 DIAGNOSIS — O26.892 PREGNANCY HEADACHE IN SECOND TRIMESTER: ICD-10-CM

## 2019-03-13 DIAGNOSIS — Z3A.22 22 WEEKS GESTATION OF PREGNANCY: ICD-10-CM

## 2019-03-13 PROCEDURE — PNV: Performed by: NURSE PRACTITIONER

## 2019-03-13 RX ORDER — CYPROHEPTADINE HYDROCHLORIDE 4 MG/1
TABLET ORAL
Qty: 30 TABLET | Refills: 3 | Status: SHIPPED | OUTPATIENT
Start: 2019-03-13 | End: 2019-07-01 | Stop reason: HOSPADM

## 2019-03-13 NOTE — PROGRESS NOTES
Assessment & Plan  29 y o   at 22w4d presenting for routine prenatal visit  Pt C/O intermittent H/A not always relieved with extra-strength Tylenol  Provided safe and effective use of Cyproheptadine  Pt to call if no relief of H/A  WNL growth and anatomy on Level II U/S     4 cm anterior fibroid was noted    Problem List Items Addressed This Visit        Other    22 weeks gestation of pregnancy    Relevant Medications    cyproheptadine (PERIACTIN) 4 mg tablet    Prenatal care in second trimester - Primary    Pregnancy headache in second trimester    Relevant Medications    cyproheptadine (PERIACTIN) 4 mg tablet        ____________________________________________________________  Subjective     She denies contractions, loss of fluid, or vaginal bleeding  She feels regular fetal movements  Objective  /71   Pulse 87   Wt 79 8 kg (176 lb)   LMP 10/06/2018 (Exact Date)   BMI 26 76 kg/m²   FHR: 140     Patient's Active Problem List  Patient Active Problem List   Diagnosis    22 weeks gestation of pregnancy    Prenatal care in second trimester    Pregnancy headache in second trimester     Plan  Take Cyproheptadine for headaches as directed  Call with vaginal bleeding, loss of fluid, regular contractions, decreased fetal movement, other needs or concerns  Return in 4 weeks  Pt verbalized understanding of all discussed

## 2019-04-10 ENCOUNTER — ROUTINE PRENATAL (OUTPATIENT)
Dept: OBGYN CLINIC | Facility: CLINIC | Age: 29
End: 2019-04-10

## 2019-04-10 VITALS
WEIGHT: 186 LBS | BODY MASS INDEX: 28.28 KG/M2 | SYSTOLIC BLOOD PRESSURE: 124 MMHG | HEART RATE: 95 BPM | DIASTOLIC BLOOD PRESSURE: 79 MMHG

## 2019-04-10 DIAGNOSIS — Z34.92 PRENATAL CARE IN SECOND TRIMESTER: ICD-10-CM

## 2019-04-10 DIAGNOSIS — Z3A.26 26 WEEKS GESTATION OF PREGNANCY: Primary | ICD-10-CM

## 2019-04-10 PROCEDURE — PNV: Performed by: NURSE PRACTITIONER

## 2019-04-29 ENCOUNTER — APPOINTMENT (OUTPATIENT)
Dept: LAB | Facility: HOSPITAL | Age: 29
End: 2019-04-29
Payer: COMMERCIAL

## 2019-04-29 DIAGNOSIS — Z3A.26 26 WEEKS GESTATION OF PREGNANCY: ICD-10-CM

## 2019-04-29 LAB
BASOPHILS # BLD AUTO: 0.02 THOUSANDS/ΜL (ref 0–0.1)
BASOPHILS NFR BLD AUTO: 0 % (ref 0–1)
EOSINOPHIL # BLD AUTO: 0.06 THOUSAND/ΜL (ref 0–0.61)
EOSINOPHIL NFR BLD AUTO: 1 % (ref 0–6)
ERYTHROCYTE [DISTWIDTH] IN BLOOD BY AUTOMATED COUNT: 12.6 % (ref 11.6–15.1)
GLUCOSE 1H P 50 G GLC PO SERPL-MCNC: 111 MG/DL
HCT VFR BLD AUTO: 39.7 % (ref 34.8–46.1)
HGB BLD-MCNC: 12.8 G/DL (ref 11.5–15.4)
IMM GRANULOCYTES # BLD AUTO: 0.07 THOUSAND/UL (ref 0–0.2)
IMM GRANULOCYTES NFR BLD AUTO: 1 % (ref 0–2)
LYMPHOCYTES # BLD AUTO: 1.32 THOUSANDS/ΜL (ref 0.6–4.47)
LYMPHOCYTES NFR BLD AUTO: 16 % (ref 14–44)
MCH RBC QN AUTO: 29.2 PG (ref 26.8–34.3)
MCHC RBC AUTO-ENTMCNC: 32.2 G/DL (ref 31.4–37.4)
MCV RBC AUTO: 91 FL (ref 82–98)
MONOCYTES # BLD AUTO: 0.53 THOUSAND/ΜL (ref 0.17–1.22)
MONOCYTES NFR BLD AUTO: 6 % (ref 4–12)
NEUTROPHILS # BLD AUTO: 6.33 THOUSANDS/ΜL (ref 1.85–7.62)
NEUTS SEG NFR BLD AUTO: 76 % (ref 43–75)
NRBC BLD AUTO-RTO: 0 /100 WBCS
PLATELET # BLD AUTO: 170 THOUSANDS/UL (ref 149–390)
PMV BLD AUTO: 11.2 FL (ref 8.9–12.7)
RBC # BLD AUTO: 4.38 MILLION/UL (ref 3.81–5.12)
WBC # BLD AUTO: 8.33 THOUSAND/UL (ref 4.31–10.16)

## 2019-04-29 PROCEDURE — 85025 COMPLETE CBC W/AUTO DIFF WBC: CPT

## 2019-04-29 PROCEDURE — 82950 GLUCOSE TEST: CPT

## 2019-04-29 PROCEDURE — 36415 COLL VENOUS BLD VENIPUNCTURE: CPT

## 2019-04-29 PROCEDURE — 86592 SYPHILIS TEST NON-TREP QUAL: CPT

## 2019-04-30 LAB — RPR SER QL: NORMAL

## 2019-05-02 ENCOUNTER — ROUTINE PRENATAL (OUTPATIENT)
Dept: OBGYN CLINIC | Facility: CLINIC | Age: 29
End: 2019-05-02

## 2019-05-02 VITALS
DIASTOLIC BLOOD PRESSURE: 77 MMHG | HEART RATE: 93 BPM | WEIGHT: 187.4 LBS | SYSTOLIC BLOOD PRESSURE: 117 MMHG | BODY MASS INDEX: 28.49 KG/M2

## 2019-05-02 DIAGNOSIS — Z23 IMMUNIZATION DUE: Primary | ICD-10-CM

## 2019-05-02 DIAGNOSIS — Z34.93 PRENATAL CARE IN THIRD TRIMESTER: ICD-10-CM

## 2019-05-02 DIAGNOSIS — Z3A.29 29 WEEKS GESTATION OF PREGNANCY: ICD-10-CM

## 2019-05-02 PROCEDURE — PNV: Performed by: NURSE PRACTITIONER

## 2019-05-02 PROCEDURE — 90715 TDAP VACCINE 7 YRS/> IM: CPT

## 2019-05-02 PROCEDURE — 90471 IMMUNIZATION ADMIN: CPT

## 2019-05-17 ENCOUNTER — ROUTINE PRENATAL (OUTPATIENT)
Dept: OBGYN CLINIC | Facility: CLINIC | Age: 29
End: 2019-05-17

## 2019-05-17 VITALS — DIASTOLIC BLOOD PRESSURE: 83 MMHG | SYSTOLIC BLOOD PRESSURE: 130 MMHG | BODY MASS INDEX: 28.98 KG/M2 | WEIGHT: 190.6 LBS

## 2019-05-17 DIAGNOSIS — Z3A.31 31 WEEKS GESTATION OF PREGNANCY: ICD-10-CM

## 2019-05-17 DIAGNOSIS — Z34.93 PRENATAL CARE IN THIRD TRIMESTER: Primary | ICD-10-CM

## 2019-05-17 PROCEDURE — PNV: Performed by: NURSE PRACTITIONER

## 2019-06-11 ENCOUNTER — ROUTINE PRENATAL (OUTPATIENT)
Dept: OBGYN CLINIC | Facility: CLINIC | Age: 29
End: 2019-06-11

## 2019-06-11 VITALS — DIASTOLIC BLOOD PRESSURE: 89 MMHG | WEIGHT: 195.6 LBS | BODY MASS INDEX: 29.74 KG/M2 | SYSTOLIC BLOOD PRESSURE: 130 MMHG

## 2019-06-11 DIAGNOSIS — Z3A.35 35 WEEKS GESTATION OF PREGNANCY: ICD-10-CM

## 2019-06-11 DIAGNOSIS — Z34.93 PRENATAL CARE IN THIRD TRIMESTER: ICD-10-CM

## 2019-06-11 DIAGNOSIS — B37.9 YEAST INFECTION: ICD-10-CM

## 2019-06-11 DIAGNOSIS — R31.9 HEMATURIA, UNSPECIFIED TYPE: Primary | ICD-10-CM

## 2019-06-11 DIAGNOSIS — Z34.93 PRENATAL CARE, THIRD TRIMESTER: ICD-10-CM

## 2019-06-11 DIAGNOSIS — Z3A.31 31 WEEKS GESTATION OF PREGNANCY: ICD-10-CM

## 2019-06-11 PROBLEM — R51.9 PREGNANCY HEADACHE IN SECOND TRIMESTER: Status: RESOLVED | Noted: 2019-03-13 | Resolved: 2019-06-11

## 2019-06-11 PROBLEM — O26.892 PREGNANCY HEADACHE IN SECOND TRIMESTER: Status: RESOLVED | Noted: 2019-03-13 | Resolved: 2019-06-11

## 2019-06-11 PROCEDURE — 87210 SMEAR WET MOUNT SALINE/INK: CPT | Performed by: NURSE PRACTITIONER

## 2019-06-11 PROCEDURE — PNV: Performed by: NURSE PRACTITIONER

## 2019-06-11 PROCEDURE — 87086 URINE CULTURE/COLONY COUNT: CPT | Performed by: NURSE PRACTITIONER

## 2019-06-11 PROCEDURE — 81001 URINALYSIS AUTO W/SCOPE: CPT | Performed by: NURSE PRACTITIONER

## 2019-06-12 LAB
BACTERIA UR QL AUTO: ABNORMAL /HPF
BILIRUB UR QL STRIP: NEGATIVE
CLARITY UR: ABNORMAL
COLOR UR: YELLOW
GLUCOSE UR STRIP-MCNC: NEGATIVE MG/DL
HGB UR QL STRIP.AUTO: ABNORMAL
KETONES UR STRIP-MCNC: NEGATIVE MG/DL
LEUKOCYTE ESTERASE UR QL STRIP: ABNORMAL
NITRITE UR QL STRIP: NEGATIVE
NON-SQ EPI CELLS URNS QL MICRO: ABNORMAL /HPF
OTHER STN SPEC: ABNORMAL
PH UR STRIP.AUTO: 7 [PH]
PROT UR STRIP-MCNC: ABNORMAL MG/DL
RBC #/AREA URNS AUTO: ABNORMAL /HPF
SP GR UR STRIP.AUTO: 1.01 (ref 1–1.03)
UROBILINOGEN UR QL STRIP.AUTO: 0.2 E.U./DL
WBC #/AREA URNS AUTO: ABNORMAL /HPF

## 2019-06-13 DIAGNOSIS — Z34.91 PRENATAL CARE IN FIRST TRIMESTER: ICD-10-CM

## 2019-06-14 LAB — BACTERIA UR CULT: NORMAL

## 2019-06-14 RX ORDER — MULTIVITAMIN
TABLET ORAL
Qty: 30 EACH | Refills: 4 | Status: SHIPPED | OUTPATIENT
Start: 2019-06-14 | End: 2020-10-21 | Stop reason: ALTCHOICE

## 2019-06-19 ENCOUNTER — ROUTINE PRENATAL (OUTPATIENT)
Dept: OBGYN CLINIC | Facility: CLINIC | Age: 29
End: 2019-06-19

## 2019-06-19 VITALS — BODY MASS INDEX: 30.35 KG/M2 | DIASTOLIC BLOOD PRESSURE: 78 MMHG | SYSTOLIC BLOOD PRESSURE: 118 MMHG | WEIGHT: 199.6 LBS

## 2019-06-19 DIAGNOSIS — Z3A.36 36 WEEKS GESTATION OF PREGNANCY: Primary | ICD-10-CM

## 2019-06-19 DIAGNOSIS — Z34.93 PRENATAL CARE IN THIRD TRIMESTER: ICD-10-CM

## 2019-06-19 PROBLEM — B37.9 YEAST INFECTION: Status: RESOLVED | Noted: 2019-06-11 | Resolved: 2019-06-19

## 2019-06-19 PROCEDURE — PNV: Performed by: NURSE PRACTITIONER

## 2019-06-19 PROCEDURE — 87653 STREP B DNA AMP PROBE: CPT | Performed by: NURSE PRACTITIONER

## 2019-06-22 LAB — GP B STREP DNA SPEC QL NAA+PROBE: ABNORMAL

## 2019-06-24 PROBLEM — B95.1 GROUP BETA STREP POSITIVE: Status: ACTIVE | Noted: 2019-06-24

## 2019-06-27 ENCOUNTER — ROUTINE PRENATAL (OUTPATIENT)
Dept: OBGYN CLINIC | Facility: CLINIC | Age: 29
End: 2019-06-27

## 2019-06-27 VITALS — BODY MASS INDEX: 30.77 KG/M2 | DIASTOLIC BLOOD PRESSURE: 83 MMHG | WEIGHT: 202.4 LBS | SYSTOLIC BLOOD PRESSURE: 129 MMHG

## 2019-06-27 DIAGNOSIS — B37.9 YEAST INFECTION: ICD-10-CM

## 2019-06-27 DIAGNOSIS — Z3A.37 37 WEEKS GESTATION OF PREGNANCY: ICD-10-CM

## 2019-06-27 DIAGNOSIS — R31.9 HEMATURIA, UNSPECIFIED TYPE: Primary | ICD-10-CM

## 2019-06-27 PROBLEM — IMO0001 CONTRACEPTION: Status: ACTIVE | Noted: 2019-06-27

## 2019-06-27 PROCEDURE — 87086 URINE CULTURE/COLONY COUNT: CPT | Performed by: STUDENT IN AN ORGANIZED HEALTH CARE EDUCATION/TRAINING PROGRAM

## 2019-06-27 PROCEDURE — 99213 OFFICE O/P EST LOW 20 MIN: CPT | Performed by: STUDENT IN AN ORGANIZED HEALTH CARE EDUCATION/TRAINING PROGRAM

## 2019-06-27 PROCEDURE — 81001 URINALYSIS AUTO W/SCOPE: CPT | Performed by: STUDENT IN AN ORGANIZED HEALTH CARE EDUCATION/TRAINING PROGRAM

## 2019-06-27 RX ORDER — FLUCONAZOLE 150 MG/1
150 TABLET ORAL ONCE
Qty: 1 TABLET | Refills: 0 | Status: SHIPPED | OUTPATIENT
Start: 2019-06-27 | End: 2019-06-27

## 2019-06-28 LAB
BACTERIA UR QL AUTO: ABNORMAL /HPF
BILIRUB UR QL STRIP: NEGATIVE
CAOX CRY URNS QL MICRO: ABNORMAL /HPF
CLARITY UR: ABNORMAL
COLOR UR: YELLOW
GLUCOSE UR STRIP-MCNC: NEGATIVE MG/DL
HGB UR QL STRIP.AUTO: ABNORMAL
KETONES UR STRIP-MCNC: NEGATIVE MG/DL
LEUKOCYTE ESTERASE UR QL STRIP: ABNORMAL
NITRITE UR QL STRIP: NEGATIVE
NON-SQ EPI CELLS URNS QL MICRO: ABNORMAL /HPF
PH UR STRIP.AUTO: 6 [PH]
PROT UR STRIP-MCNC: ABNORMAL MG/DL
RBC #/AREA URNS AUTO: ABNORMAL /HPF
SP GR UR STRIP.AUTO: 1.02 (ref 1–1.03)
UROBILINOGEN UR QL STRIP.AUTO: 0.2 E.U./DL
WBC #/AREA URNS AUTO: ABNORMAL /HPF

## 2019-06-29 LAB — BACTERIA UR CULT: NORMAL

## 2019-07-01 ENCOUNTER — ANESTHESIA (INPATIENT)
Dept: ANESTHESIOLOGY | Facility: HOSPITAL | Age: 29
End: 2019-07-01
Payer: COMMERCIAL

## 2019-07-01 ENCOUNTER — TELEPHONE (OUTPATIENT)
Dept: LABOR AND DELIVERY | Facility: HOSPITAL | Age: 29
End: 2019-07-01

## 2019-07-01 ENCOUNTER — HOSPITAL ENCOUNTER (OUTPATIENT)
Facility: HOSPITAL | Age: 29
Discharge: HOME | End: 2019-07-01
Attending: OBSTETRICS & GYNECOLOGY | Admitting: OBSTETRICS & GYNECOLOGY
Payer: COMMERCIAL

## 2019-07-01 ENCOUNTER — HOSPITAL ENCOUNTER (INPATIENT)
Facility: HOSPITAL | Age: 29
LOS: 3 days | Discharge: HOME | End: 2019-07-04
Attending: OBSTETRICS & GYNECOLOGY | Admitting: OBSTETRICS & GYNECOLOGY
Payer: COMMERCIAL

## 2019-07-01 ENCOUNTER — ANESTHESIA EVENT (INPATIENT)
Dept: ANESTHESIOLOGY | Facility: HOSPITAL | Age: 29
End: 2019-07-01
Payer: COMMERCIAL

## 2019-07-01 VITALS
RESPIRATION RATE: 16 BRPM | HEIGHT: 68 IN | HEART RATE: 85 BPM | DIASTOLIC BLOOD PRESSURE: 82 MMHG | SYSTOLIC BLOOD PRESSURE: 128 MMHG | BODY MASS INDEX: 30.62 KG/M2 | WEIGHT: 202 LBS | TEMPERATURE: 98 F

## 2019-07-01 DIAGNOSIS — Z3A.38 38 WEEKS GESTATION OF PREGNANCY: Primary | ICD-10-CM

## 2019-07-01 PROBLEM — O47.9 UTERINE CONTRACTIONS DURING PREGNANCY: Status: ACTIVE | Noted: 2019-07-01

## 2019-07-01 LAB
ABO GROUP BLD: NORMAL
BASOPHILS # BLD AUTO: 0.02 THOUSANDS/ΜL (ref 0–0.1)
BASOPHILS NFR BLD AUTO: 0 % (ref 0–1)
BLD GP AB SCN SERPL QL: NEGATIVE
EOSINOPHIL # BLD AUTO: 0.01 THOUSAND/ΜL (ref 0–0.61)
EOSINOPHIL NFR BLD AUTO: 0 % (ref 0–6)
ERYTHROCYTE [DISTWIDTH] IN BLOOD BY AUTOMATED COUNT: 13.1 % (ref 11.6–15.1)
HCT VFR BLD AUTO: 40 % (ref 34.8–46.1)
HGB BLD-MCNC: 13.1 G/DL (ref 11.5–15.4)
IMM GRANULOCYTES # BLD AUTO: 0.05 THOUSAND/UL (ref 0–0.2)
IMM GRANULOCYTES NFR BLD AUTO: 0 % (ref 0–2)
LYMPHOCYTES # BLD AUTO: 1.18 THOUSANDS/ΜL (ref 0.6–4.47)
LYMPHOCYTES NFR BLD AUTO: 9 % (ref 14–44)
MCH RBC QN AUTO: 28.8 PG (ref 26.8–34.3)
MCHC RBC AUTO-ENTMCNC: 32.8 G/DL (ref 31.4–37.4)
MCV RBC AUTO: 88 FL (ref 82–98)
MONOCYTES # BLD AUTO: 0.83 THOUSAND/ΜL (ref 0.17–1.22)
MONOCYTES NFR BLD AUTO: 7 % (ref 4–12)
NEUTROPHILS # BLD AUTO: 10.47 THOUSANDS/ΜL (ref 1.85–7.62)
NEUTS SEG NFR BLD AUTO: 84 % (ref 43–75)
NRBC BLD AUTO-RTO: 0 /100 WBCS
PLATELET # BLD AUTO: 200 THOUSANDS/UL (ref 149–390)
PMV BLD AUTO: 10.9 FL (ref 8.9–12.7)
RBC # BLD AUTO: 4.55 MILLION/UL (ref 3.81–5.12)
RH BLD: POSITIVE
SPECIMEN EXPIRATION DATE: NORMAL
WBC # BLD AUTO: 12.56 THOUSAND/UL (ref 4.31–10.16)

## 2019-07-01 PROCEDURE — 85025 COMPLETE CBC W/AUTO DIFF WBC: CPT | Performed by: OBSTETRICS & GYNECOLOGY

## 2019-07-01 PROCEDURE — NC001 PR NO CHARGE: Performed by: OBSTETRICS & GYNECOLOGY

## 2019-07-01 PROCEDURE — 99212 OFFICE O/P EST SF 10 MIN: CPT

## 2019-07-01 PROCEDURE — 86850 RBC ANTIBODY SCREEN: CPT | Performed by: OBSTETRICS & GYNECOLOGY

## 2019-07-01 PROCEDURE — 86592 SYPHILIS TEST NON-TREP QUAL: CPT | Performed by: OBSTETRICS & GYNECOLOGY

## 2019-07-01 PROCEDURE — 86901 BLOOD TYPING SEROLOGIC RH(D): CPT | Performed by: OBSTETRICS & GYNECOLOGY

## 2019-07-01 PROCEDURE — 99203 OFFICE O/P NEW LOW 30 MIN: CPT

## 2019-07-01 PROCEDURE — 86900 BLOOD TYPING SEROLOGIC ABO: CPT | Performed by: OBSTETRICS & GYNECOLOGY

## 2019-07-01 RX ORDER — OXYCODONE HYDROCHLORIDE AND ACETAMINOPHEN 5; 325 MG/1; MG/1
1 TABLET ORAL ONCE
Status: COMPLETED | OUTPATIENT
Start: 2019-07-01 | End: 2019-07-01

## 2019-07-01 RX ORDER — SODIUM CHLORIDE, SODIUM LACTATE, POTASSIUM CHLORIDE, CALCIUM CHLORIDE 600; 310; 30; 20 MG/100ML; MG/100ML; MG/100ML; MG/100ML
125 INJECTION, SOLUTION INTRAVENOUS CONTINUOUS
Status: DISCONTINUED | OUTPATIENT
Start: 2019-07-01 | End: 2019-07-04 | Stop reason: HOSPADM

## 2019-07-01 RX ORDER — ONDANSETRON 2 MG/ML
4 INJECTION INTRAMUSCULAR; INTRAVENOUS EVERY 6 HOURS PRN
Status: DISCONTINUED | OUTPATIENT
Start: 2019-07-01 | End: 2019-07-02

## 2019-07-01 RX ORDER — LIDOCAINE HYDROCHLORIDE AND EPINEPHRINE 20; 5 MG/ML; UG/ML
INJECTION, SOLUTION EPIDURAL; INFILTRATION; INTRACAUDAL; PERINEURAL AS NEEDED
Status: DISCONTINUED | OUTPATIENT
Start: 2019-07-01 | End: 2019-07-02 | Stop reason: SURG

## 2019-07-01 RX ORDER — SODIUM CHLORIDE, SODIUM LACTATE, POTASSIUM CHLORIDE, CALCIUM CHLORIDE 600; 310; 30; 20 MG/100ML; MG/100ML; MG/100ML; MG/100ML
125 INJECTION, SOLUTION INTRAVENOUS CONTINUOUS
Status: DISCONTINUED | OUTPATIENT
Start: 2019-07-01 | End: 2019-07-01 | Stop reason: HOSPADM

## 2019-07-01 RX ORDER — ACETAMINOPHEN 325 MG/1
650 TABLET ORAL ONCE
Status: COMPLETED | OUTPATIENT
Start: 2019-07-01 | End: 2019-07-01

## 2019-07-01 RX ORDER — ROPIVACAINE HYDROCHLORIDE 2 MG/ML
INJECTION, SOLUTION EPIDURAL; INFILTRATION; PERINEURAL
Status: COMPLETED
Start: 2019-07-01 | End: 2019-07-01

## 2019-07-01 RX ORDER — ROPIVACAINE HYDROCHLORIDE 2 MG/ML
INJECTION, SOLUTION EPIDURAL; INFILTRATION; PERINEURAL CONTINUOUS PRN
Status: DISCONTINUED | OUTPATIENT
Start: 2019-07-01 | End: 2019-07-02 | Stop reason: SURG

## 2019-07-01 RX ORDER — ROPIVACAINE HYDROCHLORIDE 5 MG/ML
INJECTION, SOLUTION EPIDURAL; INFILTRATION; PERINEURAL AS NEEDED
Status: DISCONTINUED | OUTPATIENT
Start: 2019-07-01 | End: 2019-07-02 | Stop reason: SURG

## 2019-07-01 RX ADMIN — ROPIVACAINE HYDROCHLORIDE 10 ML/HR: 2 INJECTION, SOLUTION EPIDURAL; INFILTRATION at 19:59

## 2019-07-01 RX ADMIN — SODIUM CHLORIDE 2.5 MILLION UNITS: 9 INJECTION, SOLUTION INTRAVENOUS at 23:26

## 2019-07-01 RX ADMIN — SODIUM CHLORIDE 5 MILLION UNITS: 0.9 INJECTION, SOLUTION INTRAVENOUS at 08:40

## 2019-07-01 RX ADMIN — SODIUM CHLORIDE 5 MILLION UNITS: 0.9 INJECTION, SOLUTION INTRAVENOUS at 19:33

## 2019-07-01 RX ADMIN — SODIUM CHLORIDE, SODIUM LACTATE, POTASSIUM CHLORIDE, AND CALCIUM CHLORIDE 999 ML/HR: .6; .31; .03; .02 INJECTION, SOLUTION INTRAVENOUS at 19:33

## 2019-07-01 RX ADMIN — LIDOCAINE HYDROCHLORIDE,EPINEPHRINE BITARTRATE 3 ML: 20; .005 INJECTION, SOLUTION EPIDURAL; INFILTRATION; INTRACAUDAL; PERINEURAL at 19:53

## 2019-07-01 RX ADMIN — OXYCODONE HYDROCHLORIDE AND ACETAMINOPHEN 1 TABLET: 5; 325 TABLET ORAL at 12:47

## 2019-07-01 RX ADMIN — SODIUM CHLORIDE, SODIUM LACTATE, POTASSIUM CHLORIDE, AND CALCIUM CHLORIDE 125 ML/HR: .6; .31; .03; .02 INJECTION, SOLUTION INTRAVENOUS at 08:40

## 2019-07-01 RX ADMIN — ACETAMINOPHEN 650 MG: 325 TABLET ORAL at 08:41

## 2019-07-01 RX ADMIN — ROPIVACAINE HYDROCHLORIDE 10 ML: 5 INJECTION, SOLUTION EPIDURAL; INFILTRATION; PERINEURAL at 19:55

## 2019-07-01 RX ADMIN — ROPIVACAINE HYDROCHLORIDE: 2 INJECTION, SOLUTION EPIDURAL; INFILTRATION at 20:05

## 2019-07-01 NOTE — DISCHARGE INSTRUCTIONS
Pregnancy at 44 to 40 Weeks   [address] [place], [place] [address] :   You are now getting close to your due date  Your due date is just an estimate of when your baby will be born  Your baby may be born before or after your due date  Your breathing may be easier if your baby has moved down into a head-down position  You may need to urinate more often because the baby may be pressing on your bladder  You may also feel more discomfort and tire easily  You may also be having trouble sleeping  DISCHARGE INSTRUCTIONS:   Seek care immediately if:   · You develop a severe headache that does not go away  · You have new or increased vision changes, such as blurred or spotted vision  · You have new or increased swelling in your face or hands  · You have vaginal spotting or bleeding  · Your water broke or you feel warm water gushing or trickling from your vagina  Contact your healthcare provider if:   · You have more than 5 contractions in 1 hour  · You notice any changes in your baby's movements  · You have abdominal cramps, pressure, or tightening  · You have a change in vaginal discharge  · You have chills or a fever  · You have vaginal itching, burning, or pain  · You have yellow, green, white, or foul-smelling vaginal discharge  · You have pain or burning when you urinate, less urine than usual, or pink or bloody urine  · You have questions or concerns about your condition or care  How to care for yourself at this stage of your pregnancy:   · Eat a variety of healthy foods  Healthy foods include fruits, vegetables, whole-grain breads, low-fat dairy foods, beans, lean meats, and fish  Drink liquids as directed  Ask how much liquid to drink each day and which liquids are best for you  Limit caffeine to less than 200 milligrams each day  Limit your intake of fish to 2 servings each week  Choose fish low in mercury such as canned light tuna, shrimp, crab, salmon, cod, or tilapia   Do not  eat fish high in mercury such as swordfish, tilefish, katie mackerel, and shark  · Take prenatal vitamins as directed  Your need for certain vitamins and minerals, such as folic acid, increases during pregnancy  Prenatal vitamins provide some of the extra vitamins and minerals you need  Prenatal vitamins may also help to decrease the risk of certain birth defects  · Rest as needed  Put your feet up if you have swelling in your ankles and feet  · Do not smoke  If you smoke, it is never too late to quit  Smoking increases your risk of a miscarriage and other health problems during your pregnancy  Smoking can cause your baby to be born too early or weigh less at birth  Ask your healthcare provider for information if you need help quitting  · Do not drink alcohol  Alcohol passes from your body to your baby through the placenta  It can affect your baby's brain development and cause fetal alcohol syndrome (FAS)  FAS is a group of conditions that causes mental, behavior, and growth problems  · Talk to your healthcare provider before you take any medicines  Many medicines may harm your baby if you take them when you are pregnant  Do not take any medicines, vitamins, herbs, or supplements without first talking to your healthcare provider  Never use illegal or street drugs (such as marijuana or cocaine) while you are pregnant  · Talk to your healthcare provider before you travel  You may not be able to travel in an airplane after 36 weeks  He may also recommend that you avoid long road trips  Safety tips:   · Avoid hot tubs and saunas  Do not use a hot tub or sauna while you are pregnant, especially during your first trimester  Hot tubs and saunas may raise your baby's temperature and increase the risk of birth defects  · Avoid toxoplasmosis  This is an infection caused by eating raw meat or being around infected cat feces  It can cause birth defects, miscarriages, and other problems   Wash your hands after you touch raw meat  Make sure any meat is well-cooked before you eat it  Avoid raw eggs and unpasteurized milk  Use gloves or ask someone else to clean your cat's litter box while you are pregnant  · Ask your healthcare provider about travel  The most comfortable time to travel is during the second trimester  Ask your healthcare provider if you can travel after 36 weeks  You may not be able to travel in an airplane after 36 weeks  He may also recommend that you avoid long road trips  Changes that are happening with your baby: Your baby is ready to be born  At birth, your baby may weigh about 6 to 9 pounds and be about 19 to 21 inches long  Your baby may be in a head-down position  Your baby will also rest lower in your abdomen  What you need to know about prenatal care: Your healthcare provider will check your blood pressure and weight  You may also need the following:  · A urine test  may also be done to check for sugar and protein  These can be signs of gestational diabetes or infection  Protein in your urine may also be a sign of preeclampsia  Preeclampsia is a condition that can develop during week 20 or later of your pregnancy  It causes high blood pressure, and it can cause problems with your kidneys and other organs  · Your baby's heart rate  will be checked  © 2017 [address] Information is for End User's use only and may not be sold, redistributed or otherwise used for commercial purposes  All illustrations and images included in CareNotes® are the copyrighted property of A D A M , Inc  or [employer]  The above information is an  only  It is not intended as medical advice for individual conditions or treatments  Talk to your doctor, nurse or pharmacist before following any medical regimen to see if it is safe and effective for you

## 2019-07-01 NOTE — TELEPHONE ENCOUNTER
Patient called L&D stating that her contractions have worsened and are now every 2-3 minutes apart  She also reports a bloody show  She was checked this morning and made change from 3/70/-2 to 4/70/-2 in triage  However, after two hours, she remained unchanged at 4cm  Since she is only 38w2d, she was discharged home with precautions  At this time, will have patient go to L&D for rule out labor       Charge RN, box aware    Pa Reveles MD  OBGYN, PGY-3  7/1/2019  5:27 PM

## 2019-07-01 NOTE — ANESTHESIA PROCEDURE NOTES
Epidural Block    Patient location during procedure: OB  Start time: 7/1/2019 7:51 PM  Reason for block: procedure for pain and at surgeon's request  Staffing  Anesthesiologist: Ameena Morris DO  Performed: anesthesiologist   Preanesthetic Checklist  Completed: patient identified, site marked, surgical consent, pre-op evaluation, timeout performed, IV checked, risks and benefits discussed and monitors and equipment checked  Epidural  Patient position: sitting  Prep: Betadine  Patient monitoring: frequent blood pressure checks  Approach: midline  Location: lumbar (1-5)  Injection technique: AVI air  Needle  Needle type: Tuohy   Catheter at skin depth: 10 cm  Test dose: negativenegative aspiration for CSF, negative aspiration for heme and no paresthesia on injection  patient tolerated the procedure well with no immediate complications

## 2019-07-01 NOTE — ANESTHESIA PREPROCEDURE EVALUATION
Review of Systems/Medical History  Patient summary reviewed  Chart reviewed      Cardiovascular  Negative cardio ROS    Pulmonary  Negative pulmonary ROS        GI/Hepatic  Negative GI/hepatic ROS          Negative  ROS        Endo/Other  Negative endo/other ROS      GYN  Currently pregnant , Prior pregnancy/OB history : 1 Parity: 0,          Hematology  Negative hematology ROS      Musculoskeletal  Negative musculoskeletal ROS        Neurology  Negative neurology ROS      Psychology   Negative psychology ROS              Physical Exam    Airway    Mallampati score: II  TM Distance: <3 FB  Neck ROM: full     Dental   No notable dental hx     Cardiovascular  Comment: Negative ROS, Rhythm: regular, Rate: normal, Cardiovascular exam normal    Pulmonary  Pulmonary exam normal     Other Findings        Anesthesia Plan  ASA Score- 2     Anesthesia Type- epidural with ASA Monitors  Additional Monitors:   Airway Plan:         Plan Factors- Patient instructed to abstain from smoking on day of procedure  Patient did not smoke on day of surgery  Induction-     Postoperative Plan-     Informed Consent- Anesthetic plan and risks discussed with patient and spouse

## 2019-07-01 NOTE — H&P
H&P Exam - Obstetrics   Rachel Palma 29 y o  female MRN: [id number]  Unit/Bed#: L&D 323-01 Encounter: [id number]      History of Present Illness     Chief Complaint: Labor    HPI:  Rachel Palma is a 29 y o   female with an ROSIE of 2019, by Last Menstrual Period at 38w2d weeks gestation who is being admitted for labor  She was evaluated this morning and labor and delivery for pain contractions and was found to be 3/70/-2  Two hours later, she made change to 4/70/-2  However, she did not further change  Due to a gestational age < 43 weeks, she was discharged home with precautions  Patient called L&D around 1700 stating that her contractions were worsening in intensity  She is being admitted for labor as SVE is 5-6/100/-2  Contractions: Present  Loss of fluid: Absent  Vaginal bleeding:  Absent  Fetal movement: Present    She is a [facility] patient  PREGNANCY COMPLICATIONS:   1) GBS positive status    OB History    Para Term  AB Living   1             SAB TAB Ectopic Multiple Live Births                  # Outcome Date GA Lbr [place]/2nd Weight Sex Delivery Anes PTL Lv   1 Current                Baby complications/comments: None    Review of Systems   Constitutional: Negative  Respiratory: Negative  Cardiovascular: Negative  Gastrointestinal: Negative  Genitourinary: Negative  Musculoskeletal: Negative        Historical Information   Past Medical History:   Diagnosis Date    Varicella      Past Surgical History:   Procedure Laterality Date    WISDOM TOOTH EXTRACTION       Social History   Social History     Substance and Sexual Activity   Alcohol Use No     Social History     Substance and Sexual Activity   Drug Use No     Social History     Tobacco Use   Smoking Status Never Smoker   Smokeless Tobacco Never Used     Family History: non-contributory    Meds/Allergies      Medications Prior to Admission   Medication    Prenatal Vit-Fe Fumarate-FA ([facility] PRENAT[facility]) 28-0 8 MG TABS      No Known Allergies    OBJECTIVE:    Vitals: Blood pressure 140/92, pulse 89, temperature 98 2 °F (36 8 °C), temperature source Oral, resp  rate 18, last menstrual period 10/06/2018  There is no height or weight on file to calculate BMI  Physical Exam   Constitutional: She is oriented to person, place, and time  She appears well-developed and well-nourished  No distress  Cardiovascular: Normal rate, regular rhythm and normal heart sounds  Exam reveals no gallop and no friction rub  No murmur heard  Pulmonary/Chest: Effort normal and breath sounds normal  No respiratory distress  She has no wheezes  She has no rales  She exhibits no tenderness  Abdominal: Soft  Bowel sounds are normal  She exhibits no distension  There is no tenderness  There is no rebound and no guarding  Neurological: She is alert and oriented to person, place, and time  Skin: She is not diaphoretic  Psychiatric: She has a normal mood and affect  Her behavior is normal  Judgment and thought content normal    Vitals reviewed      Cervix:  Dilation: 5  Effacement (%): 100  Station: -2    Fetal heart rate:   Baseline: 150  Variability: moderate   Accelerations: 15x15, episodic   Decelerations: absent     [place]:   Every 4 minutes    EFW: 7lb    GBS: Positive    Prenatal Labs:   Blood Type:   Lab Results   Component Value Date/Time    ABO Grouping B 12/12/2018 09:09 AM     , D (Rh type):   Lab Results   Component Value Date/Time    Rh Factor Positive 12/12/2018 09:09 AM     , HCT/HGB:   Lab Results   Component Value Date/Time    Hematocrit 39 7 04/29/2019 11:01 AM    Hemoglobin 12 8 04/29/2019 11:01 AM      , MCV:   Lab Results   Component Value Date/Time    MCV 91 04/29/2019 11:01 AM      , Platelets:   Lab Results   Component Value Date/Time    Platelets 597 25/66/4137 11:01 AM      , 1 hour Glucola:   Lab Results   Component Value Date/Time    Glucose 111 04/29/2019 11:01 AM   , Varicella:   Lab Results   Component Value Date/Time Varicella IgG IMMUNE 2018 09:09 AM       , Rubella:   Lab Results   Component Value Date/Time    Rubella IgG Quant >175 0 2018 09:09 AM        , VDRL/RPR:   Lab Results   Component Value Date/Time    RPR Non-Reactive 2019 11:01 AM      , Urine Culture/Screen:   Lab Results   Component Value Date/Time    Urine Culture No Growth <1000 cfu/mL 2019 11:11 AM       , Hep B:   Lab Results   Component Value Date/Time    Hepatitis B Surface Ag Non-reactive 2018 09:09 AM     ,  HIV:   Lab Results   Component Value Date/Time    HIV-1/HIV-2 Ab Non-Reactive 2018 09:09 AM     ,  Gonorrhea:   Lab Results   Component Value Date/Time    N gonorrhoeae, DNA Probe Negative 2018 09:37 AM     , Group B Strep:    Lab Results   Component Value Date/Time    Strep Grp B PCR Positive for Beta Hemolytic Strep Grp B by PCR (A) 2019 09:27 AM          Invasive Devices     None                   Assessment/Plan     ASSESSMENT:  29yo  at 38w2d weeks gestation who is being admitted for labor  PLAN:    - Admit  - CBC, RPR, Blood Type  - Start with expectant management  - Method of contraception: Would like IUD - considering mirena vs paragard  - GBS positive status: start PCN for prophylaxis   - Analgesia and/or epidural at patient request  - Anticipate     Discussed with Dr Dawna Beltran      This patient will be an INPATIENT  and I certify the anticipated length of stay is >2 Midnights      Jeovany Key MD  2019  7:27 PM

## 2019-07-01 NOTE — PROGRESS NOTES
OB Triage Note  Jeni Duvall 29 y o  female MRN: [id number]  Unit/Bed#: L&D 329-02     ROSIE: Estimated Date of Delivery: 19    HPI: 29 y o   at 36w4d with c/o contractions and vaginal bleeding  She  complains of uterine contractions, occurring every 5-6 minutes, no LOF, and dark red vaginal bleeding in morning but no episodes since then   She states she has felt good FM  OB Cx: none    FHT:Baseline   Baseline Rate: 135 bpm  Variability: Moderate 6-25 bpm  Accelerations: 15 x 15 or greater  Decelerations: None  TOCO:   Contraction Frequency (minutes): 1 5-3  Contraction Duration (seconds): 50-90  Contraction Quality: Moderate    Vitals:   Temp:  [98 °F (36 7 °C)] 98 °F (36 7 °C)  HR:  [85] 85  Resp:  [16] 16  BP: (128)/(82) 128/82    Physical Exam:  General: AAOX3  No acute distress   Abdominal: Soft  NT/ND  Gravid      SVE:  Dilation: 4-5  Effacement (%): 70  Station: -2  Cervical Characteristics: Posterior    Ultrasound: see fetal testing note      · A/P:  at 38w2d here for rule out labor   On SVE pt was initially 3/70/-2  She was started on IV fluids,1 dose of Penicillin 5 million U was given  Pt was reassessed after 2 hours at 10:15 am, she was complaining of more intense contractions at that time but on SVE 4/70/-2  Pt was again reassessed after 2 hours and was still 4-5/70/-2  Pt was discharged with instructions to return in case of leakage of fluid, decreased fetal movements, regular contractions every 3 minutes  Dr Elle Stockton aware      Signature/Title: Carri Dorsey MD  Date: 2019  Time: 1:10 PM

## 2019-07-02 LAB
BASE EXCESS BLDCOA CALC-SCNC: -6.8 MMOL/L (ref 3–11)
BASE EXCESS BLDCOV CALC-SCNC: -4.8 MMOL/L (ref 1–9)
HCO3 BLDCOA-SCNC: 20.5 MMOL/L (ref 17.3–27.3)
HCO3 BLDCOV-SCNC: 19.5 MMOL/L (ref 12.2–28.6)
O2 CT VFR BLDCOA CALC: 14.7 ML/DL
OXYHGB MFR BLDCOA: 68 %
OXYHGB MFR BLDCOV: 79.7 %
PCO2 BLDCOA: 47.7 MM[HG] (ref 30–60)
PCO2 BLDCOV: 34.4 MM HG (ref 27–43)
PH BLDCOA: 7.25 [PH] (ref 7.23–7.43)
PH BLDCOV: 7.37 [PH] (ref 7.19–7.49)
PO2 BLDCOA: 32.8 MM HG (ref 5–25)
PO2 BLDCOV: 36.3 MM HG (ref 15–45)
RPR SER QL: NORMAL

## 2019-07-02 PROCEDURE — 0HQ9XZZ REPAIR PERINEUM SKIN, EXTERNAL APPROACH: ICD-10-PCS | Performed by: OBSTETRICS & GYNECOLOGY

## 2019-07-02 PROCEDURE — 59400 OBSTETRICAL CARE: CPT | Performed by: OBSTETRICS & GYNECOLOGY

## 2019-07-02 PROCEDURE — 10907ZC DRAINAGE OF AMNIOTIC FLUID, THERAPEUTIC FROM PRODUCTS OF CONCEPTION, VIA NATURAL OR ARTIFICIAL OPENING: ICD-10-PCS | Performed by: OBSTETRICS & GYNECOLOGY

## 2019-07-02 PROCEDURE — 82805 BLOOD GASES W/O2 SATURATION: CPT | Performed by: OBSTETRICS & GYNECOLOGY

## 2019-07-02 PROCEDURE — NC001 PR NO CHARGE: Performed by: OBSTETRICS & GYNECOLOGY

## 2019-07-02 RX ORDER — OXYTOCIN/RINGER'S LACTATE 30/500 ML
PLASTIC BAG, INJECTION (ML) INTRAVENOUS
Status: COMPLETED
Start: 2019-07-02 | End: 2019-07-02

## 2019-07-02 RX ORDER — ACETAMINOPHEN 325 MG/1
650 TABLET ORAL EVERY 6 HOURS PRN
Status: DISCONTINUED | OUTPATIENT
Start: 2019-07-02 | End: 2019-07-04 | Stop reason: HOSPADM

## 2019-07-02 RX ORDER — DOCUSATE SODIUM 100 MG/1
100 CAPSULE, LIQUID FILLED ORAL 2 TIMES DAILY
Status: DISCONTINUED | OUTPATIENT
Start: 2019-07-02 | End: 2019-07-04 | Stop reason: HOSPADM

## 2019-07-02 RX ORDER — IBUPROFEN 600 MG/1
600 TABLET ORAL EVERY 6 HOURS PRN
Status: DISCONTINUED | OUTPATIENT
Start: 2019-07-02 | End: 2019-07-04 | Stop reason: HOSPADM

## 2019-07-02 RX ORDER — ONDANSETRON 2 MG/ML
4 INJECTION INTRAMUSCULAR; INTRAVENOUS EVERY 8 HOURS PRN
Status: DISCONTINUED | OUTPATIENT
Start: 2019-07-02 | End: 2019-07-04 | Stop reason: HOSPADM

## 2019-07-02 RX ORDER — OXYCODONE HYDROCHLORIDE AND ACETAMINOPHEN 5; 325 MG/1; MG/1
1 TABLET ORAL EVERY 4 HOURS PRN
Status: DISCONTINUED | OUTPATIENT
Start: 2019-07-02 | End: 2019-07-04 | Stop reason: HOSPADM

## 2019-07-02 RX ORDER — DIPHENHYDRAMINE HYDROCHLORIDE 50 MG/ML
25 INJECTION INTRAMUSCULAR; INTRAVENOUS EVERY 6 HOURS PRN
Status: DISCONTINUED | OUTPATIENT
Start: 2019-07-02 | End: 2019-07-02

## 2019-07-02 RX ORDER — OXYCODONE HYDROCHLORIDE AND ACETAMINOPHEN 5; 325 MG/1; MG/1
2 TABLET ORAL EVERY 4 HOURS PRN
Status: DISCONTINUED | OUTPATIENT
Start: 2019-07-02 | End: 2019-07-04 | Stop reason: HOSPADM

## 2019-07-02 RX ORDER — CALCIUM CARBONATE 200(500)MG
1000 TABLET,CHEWABLE ORAL DAILY PRN
Status: DISCONTINUED | OUTPATIENT
Start: 2019-07-02 | End: 2019-07-04 | Stop reason: HOSPADM

## 2019-07-02 RX ORDER — DIPHENHYDRAMINE HCL 25 MG
25 TABLET ORAL EVERY 6 HOURS PRN
Status: DISCONTINUED | OUTPATIENT
Start: 2019-07-02 | End: 2019-07-04 | Stop reason: HOSPADM

## 2019-07-02 RX ORDER — OXYTOCIN/RINGER'S LACTATE 30/500 ML
250 PLASTIC BAG, INJECTION (ML) INTRAVENOUS CONTINUOUS
Status: ACTIVE | OUTPATIENT
Start: 2019-07-02 | End: 2019-07-02

## 2019-07-02 RX ADMIN — IBUPROFEN 600 MG: 600 TABLET ORAL at 09:09

## 2019-07-02 RX ADMIN — SODIUM CHLORIDE 2.5 MILLION UNITS: 9 INJECTION, SOLUTION INTRAVENOUS at 03:25

## 2019-07-02 RX ADMIN — DOCUSATE SODIUM 100 MG: 100 CAPSULE, LIQUID FILLED ORAL at 08:31

## 2019-07-02 RX ADMIN — Medication 250 MILLI-UNITS/MIN: at 05:05

## 2019-07-02 RX ADMIN — WITCH HAZEL 1 PAD: 500 SOLUTION RECTAL; TOPICAL at 08:30

## 2019-07-02 RX ADMIN — BENZOCAINE AND LEVOMENTHOL: 200; 5 SPRAY TOPICAL at 08:30

## 2019-07-02 RX ADMIN — DOCUSATE SODIUM 100 MG: 100 CAPSULE, LIQUID FILLED ORAL at 17:04

## 2019-07-02 RX ADMIN — IBUPROFEN 600 MG: 600 TABLET ORAL at 16:57

## 2019-07-02 RX ADMIN — IBUPROFEN 600 MG: 600 TABLET ORAL at 22:59

## 2019-07-02 NOTE — DISCHARGE SUMMARY
Discharge Summary - OB/GYN   Nina Dupree 29 y o  female MRN: [id number]  Unit/Bed#: L&D 323-01 Encounter: [id number]      Admission Date: 2019     Discharge Date: 2019    Admitting Diagnosis:   Patient Active Problem List   Diagnosis    38 weeks gestation of pregnancy    Prenatal care in third trimester    Group beta Strep positive    Contraception    Uterine contractions during pregnancy     Discharge Diagnosis:   Same, delivered    Procedures: spontaneous vaginal delivery    Delivery Attending: Cherie Nelson MD  Discharge Attending: David Tabares DO    Hospital Course:   Nina Dupree is a 29 y o   at 38w3d wks who was initially admitted for active labor  After admission she has had progressed spontaneously  She delivered a viable female  on 2019 at [address] [place] [address]  Weight 7lbs 6oz via spontaneous vaginal delivery  Apgars were 9 (1 min) and 9 (5 min)   was transferred to  nursery  Patient tolerated the procedure well and was transferred to recovery in stable condition  Her post-partum course was complicated by none  Her post-partum pain was well controlled with oral analgesics  On day of discharge, she was ambulating and able to reasonably perform all ADLs  She was voiding and had appropriate bowel function  Pain was well controlled  She was discharged home on post-partum day #2 without complications  Patient was instructed to follow up with her OB as an outpatient and was given appropriate warnings to call provider if she develops signs of infection or uncontrolled pain  Complications: none apparent    Condition at discharge: good     Discharge instructions/Information to patient and family:   See after visit summary for information provided to patient and family  Provisions for Follow-Up Care:  See after visit summary for information related to follow-up care and any pertinent home health orders        Disposition: Home    Planned Readmission: No    Discharge Medications: For a complete list of the patient's medications, please refer to her med rec      Cheyenne Estrada,   07/04/19  2:48 PM

## 2019-07-02 NOTE — DISCHARGE INSTRUCTIONS
Vaginal Delivery   AMBULATORY CARE:   What you need to know about vaginal delivery:  A vaginal delivery occurs when your baby is born through your vagina (birth canal)  How to prepare for vaginal delivery: You will not be able to eat while you are in active labor  Your healthcare provider can give you medicines for pain relief if you chose to have them  You may need medicine to induce (start) your labor if your labor is not moving forward  You may need to move in bed, stand, or walk to help your baby move into position for birth  What will happen during vaginal delivery:   · You can move into several positions during delivery  You can lie on your back, have your feet up in stirrups, or squat  You may feel pressure on your rectum  This pressure is caused by the movement of your baby's head down the birth canal  You may feel the urge to push  Your healthcare provider will tell you when to push, and guide your baby out of the birth canal  Healthcare providers may use forceps or suction to help deliver your baby  You may also need an episiotomy (incision) to make the vaginal opening larger  This will make more room for your baby  · After your baby is born, your healthcare provider will put clamps on the cord that connects your baby to the placenta  The cord is then cut  Your uterus continues to contract after delivery to push out the placenta  Your healthcare provider may close your episiotomy incision or any tears with stitches  What will happen after vaginal delivery:   · Healthcare providers will examine your baby  You may be able to hold your baby soon after he or she is born  After healthcare providers have checked that you and your baby are okay, you may be taken to another room  · A healthcare provider may massage your abdomen several times to make your uterus firm  This can be uncomfortable   You may have abdominal pains for up to 3 days after you give birth because your uterus is still aidan  The contractions help release blood from inside your uterus so it shrinks back to its normal size  These contractions may hurt more while you breastfeed your baby  · Your healthcare provider may suggest you get out of bed to sit in a chair or walk  Activity can help prevent blood clots  · You may be able to go home within 24 to 48 hours after delivery  If you need support at home, ask your healthcare provider about home visits by another healthcare provider  This healthcare provider can help you learn about breastfeeding, bottle feeding, baby care, and perineum care  Follow up with your healthcare provider:  Most women need to return 6 weeks after a vaginal delivery  Ask your healthcare provider how to care for your wounds or stitches, if you have them  Write down your questions so you remember to ask them during your visits  Seek care immediately if:   · Your leg feels warm, tender, and painful  It may look swollen and red  · You have a fever  · You are urinating very little, or not at all  · You have heavy vaginal bleeding that fills 1 or more sanitary pads in 1 hour  · You feel weak, dizzy, or faint  Contact your healthcare provider if:   · Your abdominal or perineal pain does not go away, or gets worse  · You feel depressed  · You have questions or concerns about your condition or care  Medicines:  · NSAIDs , such as ibuprofen, help decrease swelling, pain, and fever  This medicine is available with or without a doctor's order  NSAIDs can cause stomach bleeding or kidney problems in certain people  If you take blood thinner medicine, always ask your healthcare provider if NSAIDs are safe for you  Always read the medicine label and follow directions  · Stool softeners  make it easier for you to have a bowel movement  You may need this medicine to treat or prevent constipation  · Take your medicine as directed    Contact your healthcare provider if you think your medicine is not helping or if you have side effects  Tell him or her if you are allergic to any medicine  Keep a list of the medicines, vitamins, and herbs you take  Include the amounts, and when and why you take them  Bring the list or the pill bottles to follow-up visits  Carry your medicine list with you in case of an emergency  Activity:  Rest as much as possible  Try to keep all activities short  You may be able to do some exercise soon after you have your baby  Talk with your healthcare provider before you start exercising  If you work outside the home, ask when you can return to your job  Kegel exercises:  Kegel exercises may help your vaginal and rectal muscles heal faster  You can do Kegel exercises by tightening and relaxing the muscles around your vagina  Kegel exercises help make the muscles stronger  Breast care:  When your milk comes in, your breasts may feel full and hard  Ask how to care for your breasts, even if you are not breastfeeding  Constipation:  You may have constipation for a period of time after you have your baby  Do not try to push the bowel movement out if it is too hard  High-fiber foods and extra liquids can help you prevent constipation  Examples of high-fiber foods are fruit and bran  Prune juice and water are good liquids to drink  You may also be told to take over-the-counter fiber and stool softener medicines  Take these items as directed  Ask how to prevent or treat hemorrhoids  Perineum care: Your perineum is the area between your vagina and anus  Keep the area clean and dry  This will help it heal and prevent infection  Wash the area gently with soap and water when you bathe or shower  Rinse your perineum with warm water after you urinate or have a bowel movement  Your healthcare provider may suggest you use a warm sitz bath to help decrease pain  To take a sitz bath, fill a bathtub with 4 to 6 inches of warm water   You may also use a sitz bath pan that fits inside the toilet  Sit in the sitz bath for 20 minutes  Do this 2 to 3 times a day, or as directed  The warm water can help decrease pain and swelling  Vaginal discharge: You will have vaginal discharge, called lochia, after your delivery  The lochia is red or dark brown with clots for 1 to 3 days after the birth  The amount will decrease and turn pale pink or brown for 3 to 10 days  It will turn white or yellow on the 10th or 14th day  Lochia is usually gone within 3 weeks  Use a sanitary pad rather than a tampon to prevent a vaginal infection  You will have lochia for up to 3 weeks after your baby is born  Monthly periods: Your period may start again within 7 to 9 weeks after your baby is born  If you are breastfeeding, it may take longer for your period to start again  You can still get pregnant again even though you do not have your monthly period  Talk with your healthcare provider about a birth control method if you do not want to get pregnant  Mood changes: Many new mothers have some kind of mood changes after delivery  Some of these changes occur because of lack of sleep, hormone changes, and caring for a new baby  Some mood changes can be more serious, such as postpartum depression  Talk with your healthcare provider if you feel unable to care for yourself or your baby  Sexual activity:  Do not have sex until your healthcare provider says it is okay  You may notice you have a decreased desire for sex, or sex may be painful  You may need to use a vaginal lubricant (gel) to help make sex more comfortable  © 2017 [address]  Information is for End User's use only and may not be sold, redistributed or otherwise used for commercial purposes  All illustrations and images included in CareNotes® are the copyrighted property of [employer]  or [employer]  The above information is an  only  It is not intended as medical advice for individual conditions or treatments   Talk to your doctor, nurse or pharmacist before following any medical regimen to see if it is safe and effective for you  Breast Care for the Breast Feeding Mother   WHAT YOU SHOULD KNOW:   Your breasts will go through normal changes while you are breastfeeding  Sometimes breast and nipple problems can develop while you are breastfeeding  Learn about changes that are normal and those that may be a problem  Breast care can help you prevent and manage problems so you and your baby can enjoy the benefits of breastfeeding  INSTRUCTIONS:   Breast changes while you are breastfeeding:   · For the first few days after your baby is born, your body makes a small amount of breast milk (colostrum)  Within about 2 to 5 days, your body will begin making mature milk  It may take up to 10 days or longer for mature milk to come in  When your mature milk comes in, your breasts will become full and firm  They may feel tender  · Breastfeeding your baby will decrease the full feeling in your breasts  You may feel a tingly sensation during feedings as milk is released from your breasts  This is called the milk let-down reflex  After 7 or more days, the fullness may feel like it has decreased  Your nipples should look the same as they did before you started breastfeeding  Breasts that feel full before and empty after breastfeeding are signs that breastfeeding is going well  Breast problems that can occur while you are breastfeeding:   · Nipple soreness  may occur when you begin to breastfeed your baby  You may also have nipple soreness if your baby is not latched on to your breast correctly  Correct positioning and latch-on may decrease or stop the pain in your nipples  Work with your caregivers to help your baby latch on correctly  It may also be helpful to place warm, wet compresses on your nipples to help decrease pain  · Plugged milk ducts  may cause painful breast lumps   Plugged ducts may be caused by not emptying your breasts completely during feedings  When your baby pauses during breastfeeding, massage and gently squeeze your breast  Gentle massage may unplug a blocked milk duct  Pump out any milk left in your breasts after your baby is done breastfeeding  Avoid wearing tight tops, tight bras, or under-wire bras, because they may put pressure on your breasts  · Engorgement  may occur as your milk comes in soon after you begin breastfeeding  Engorgement may cause your breasts to become swollen and painful  Your breasts may also become engorged if you miss a feeding or you do not breastfeed on demand  The best way to decrease engorgement symptoms is to empty your breasts by feeding your baby often  Engorgement can make it hard for your baby to latch on to your breast  If this happens, express a small amount of milk and then have your baby latch on  Cold compresses, gel packs, or ice packs on your breasts can help decrease pain and swelling  Ask your caregiver how often and how long you should use cold, or ice packs  · A breast infection called mastitis  can develop if you have plugged milk ducts or engorgement  Mastitis causes your breasts to become red, swollen, and painful  You may also have flu-like symptoms, such as chills and a fever  Place heat on your breasts to help decrease the pain  You may want to place a moist, warm cloth on the painful breast or both of your breasts  Ask how often to do this  Your primary healthcare provider [facility]) may suggest that you take an NSAID, such as ibuprofen, to decrease pain and swelling  He may also order antibiotics to treat mastitis  Ask about feeding your baby when you have a breast infection  How to help prevent or manage breast problems while you are breastfeeding:   · Learn how to position your baby and latch him on correctly  To latch your baby correctly to your breast, make sure that his mouth covers most of your areola (dark area around your nipple)   He should not be attached only to the nipple  Your baby is latched on well if you feel comfortable and do not feel pain  A correct latch helps him get enough milk and can help to prevent sore nipples and other breast problems  There are several breastfeeding positions that you can try  Find the position that works best for you and your baby  Ask your caregiver for more information about how to hold and breastfeed your baby  · Prevent biting  Your baby may get teeth at about 1to 3months of age  To help prevent biting, break his suction once he is finished or if he has fallen asleep  To break his suction, slip a finger into the side of his mouth  If your baby bites you, respond with surprise or unhappiness  Offer praise when he does not bite you  · Breastfeed your baby regularly  Feed your baby 8 to 12 times a day  You may need to wake up your baby at night to feed him  It is okay to feed from 1 or both breasts at each feeding  Your baby should breastfeed from both breasts equally over the course of a day  If your baby only feeds from 1 side during a feeding, offer your other breast to him first for the next feeding  · Schedule and keep follow-up visits  Talk to your baby's pediatrician or your PHP during follow-up visits if you have breast problems  Caregivers may suggest that you, or you and your partner, attend classes on breastfeeding  You also may want to join a breastfeeding support group  Caregivers may suggest that you see a lactation consultant  This is a caregiver who can help you with breastfeeding  Contact your PHP if:   · You have a fever and chills  · You have body aches and you feel like you do not have any energy  · One or both of your breasts is red, swollen or hard, painful, and feels warm or hot  · You have breast engorgement that does not get better within 24 hours  · You see or feel a lump in your breast that hurts when you touch it      · You have nipple pain during breastfeeding or between feedings  · Your nipples are red, dry, cracked, bleeding, or they have scabs on them  · You have questions or concerns about your condition or care  © 2014 [address] is for End User's use only and may not be sold, redistributed or otherwise used for commercial purposes  All illustrations and images included in CareNotes® are the copyrighted property of [employer]  or [employer] Gran[employer]  The above information is an  only  It is not intended as medical advice for individual conditions or treatments  Talk to your doctor, nurse or pharmacist before following any medical regimen to see if it is safe and effective for you

## 2019-07-02 NOTE — L&D DELIVERY NOTE
Delivery Summary - OB/GYN   Delman Fabry 29 y o  female MRN: [id number]  Unit/Bed#: L&D 323-01 Encounter: [id number]    Pre-delivery Diagnosis:   Patient Active Problem List   Diagnosis    38 weeks gestation of pregnancy    Prenatal care in third trimester    Group beta Strep positive    Contraception    Uterine contractions during pregnancy       Post-delivery Diagnosis: same, delivered    Attending: Dr Luann Mtz    Assistant(s): Dr Bobbi Tamayo    Procedure:     Anesthesia: epidural    Estimated Blood Loss:  Check QBL     Specimens:   1  Arterial and venous cord gases  2  Cord blood  3  Segment of umbilical cord  4  Placenta to storage     Complications:  None apparent    Findings:  1  Viable female  delivered on 19 at 0502 weighing pending lbs pending oz;  Apgar scores of 9 at one minute and 9 at five minutes  2  Spontaneous delivery of placenta with centrally inserted 3-vessel cord  3  1st degree perineal laceration, repaired with 3-0Vicryl rapid       Disposition: Patient tolerated the procedure well and was recovering in labor and delivery room with family and  before being transferred to the post-partum floor  Procedure Details     Description of procedure    Patient admitted for labor  She has progressed after admission  On demand she received epidural   She got come completely dilated and she started to push  After pushing for 96 minutes, on 19 at [address] [place] [address] patient delivered a viable female , weighing pending, Apgars of 9 (1 min) and 9 (5 min)  The fetal vertex delivered spontaneously  There was no nuchal cord  The anterior shoulder delivered atraumatically with maternal expulsive forces and the assistance of downward traction  The posterior shoulder delivered with maternal expulsive forces and the assistance of upward traction  The remainder of the fetus delivered spontaneously       Upon delivery, the infant was placed on the mothers abdomen and the cord was clamped and cut  The infant was noted to cry spontaneously and was moving all extremities appropriately  There was no evidence for injury  Awaiting nurse resuscitators evaluated the  at bedside  Arterial and venous cord blood gases and cord blood was collected for analysis  These were promptly sent to the lab  In the immediate post-partum, 30 units of IV pitocin was administered and the uterus was noted to contract down well with massage and pitocin  The placenta delivered spontaneously at 0506 and was noted to have a centrally inserted 3 vessel cord  The vagina, cervix, and perineum were inspected and there was noted to be 1st degree perineal laceration  Laceration Repair  Patient was comfortable with epidural at that time  A 1st degree perineal laceration was identified and required repair  Laceration was repaired with 3-0 Vicryl rapid in good to reapproximate the laceration  Good hemostasis was confirmed at the conclusion of this procedure  At the conclusion of the delivery, all needle, sponge, and instrument counts were noted to be correct  Patient tolerated the procedure well and was allowed to recover in labor and delivery room with family and  before being transferred to the post-partum floor  Dr Amrita Eng was present and participated in all key portions of the case      Beba Baldwin MD  OBGYN, PGY-1  2019  5:31 AM

## 2019-07-02 NOTE — PLAN OF CARE
Problem: PAIN - ADULT  Goal: Verbalizes/displays adequate comfort level or baseline comfort level  Description  Interventions:  - Encourage patient to monitor pain and request assistance  - Assess pain using appropriate pain scale  - Administer analgesics based on type and severity of pain and evaluate response  - Implement non-pharmacological measures as appropriate and evaluate response  - Consider cultural and social influences on pain and pain management  - Notify physician/advanced practitioner if interventions unsuccessful or patient reports new pain  Outcome: Progressing     Problem: INFECTION - ADULT  Goal: Absence or prevention of progression during hospitalization  Description  INTERVENTIONS:  - Assess and monitor for signs and symptoms of infection  - Monitor lab/diagnostic results  - Monitor all insertion sites, i e  indwelling lines, tubes, and drains  - Monitor endotracheal (as able) and nasal secretions for changes in amount and color  - [place] appropriate cooling/warming therapies per order  - Administer medications as ordered  - Instruct and encourage patient and family to use good hand hygiene technique  - Identify and instruct in appropriate isolation precautions for identified infection/condition  Outcome: Progressing  Goal: Absence of fever/infection during neutropenic period  Description  INTERVENTIONS:  - Monitor WBC  - Implement neutropenic guidelines  Outcome: Progressing     Problem: SAFETY ADULT  Goal: Patient will remain free of falls  Description  INTERVENTIONS:  - Assess patient frequently for physical needs  -  Identify cognitive and physical deficits and behaviors that affect risk of falls    -  [place] fall precautions as indicated by assessment   - Educate patient/family on patient safety including physical limitations  - Instruct patient to call for assistance with activity based on assessment  - Modify environment to reduce risk of injury  - Consider OT/PT consult to assist with strengthening/mobility  Outcome: Progressing  Goal: Maintain or return to baseline ADL function  Description  INTERVENTIONS:  -  Assess patient's ability to carry out ADLs; assess patient's baseline for ADL function and identify physical deficits which impact ability to perform ADLs (bathing, care of mouth/teeth, toileting, grooming, dressing, etc )  - Assess/evaluate cause of self-care deficits   - Assess range of motion  - Assess patient's mobility; develop plan if impaired  - Assess patient's need for assistive devices and provide as appropriate  - Encourage maximum independence but intervene and supervise when necessary  ¯ Involve family in performance of ADLs  ¯ Assess for home care needs following discharge   ¯ Request OT consult to assist with ADL evaluation and planning for discharge  ¯ Provide patient education as appropriate  Outcome: Progressing  Goal: Maintain or return mobility status to optimal level  Description  INTERVENTIONS:  - Assess patient's baseline mobility status (ambulation, transfers, stairs, etc )    - Identify cognitive and physical deficits and behaviors that affect mobility  - Identify mobility aids required to assist with transfers and/or ambulation (gait belt, sit-to-stand, lift, walker, cane, etc )  - [place] fall precautions as indicated by assessment  - Record patient progress and toleration of activity level on Mobility SBAR; progress patient to next Phase/Stage  - Instruct patient to call for assistance with activity based on assessment  - Request Rehabilitation consult to assist with strengthening/weightbearing, etc   Outcome: Progressing     Problem: Knowledge Deficit  Goal: Patient/family/caregiver demonstrates understanding of disease process, treatment plan, medications, and discharge instructions  Description  Complete learning assessment and assess knowledge base    Interventions:  - Provide teaching at level of understanding  - Provide teaching via preferred learning methods  Outcome: Progressing     Problem: DISCHARGE PLANNING  Goal: Discharge to home or other facility with appropriate resources  Description  INTERVENTIONS:  - Identify barriers to discharge w/patient and caregiver  - Arrange for needed discharge resources and transportation as appropriate  - Identify discharge learning needs (meds, wound care, etc )  - Arrange for interpretive services to assist at discharge as needed  - Refer to Case Management Department for coordinating discharge planning if the patient needs post-hospital services based on physician/advanced practitioner order or complex needs related to functional status, cognitive ability, or social support system  Outcome: Progressing     Problem: POSTPARTUM  Goal: Experiences normal postpartum course  Description  INTERVENTIONS:  - Monitor maternal vital signs  - Assess uterine involution and lochia  Outcome: Progressing  Goal: Appropriate maternal -  bonding  Description  INTERVENTIONS:  - Identify family support  - Assess for appropriate maternal/infant bonding   -Encourage maternal/infant bonding opportunities  - Referral to  or  as needed  Outcome: Progressing  Goal: Establishment of infant feeding pattern  Description  INTERVENTIONS:  - Assess breast/bottle feeding  - Refer to lactation as needed  Outcome: Progressing  Goal: Incision(s), wounds(s) or drain site(s) healing without S/S of infection  Description  INTERVENTIONS  - Assess and document risk factors for skin impairment   - Assess and document dressing, incision, wound bed, drain sites and surrounding tissue  - Initiate Nutrition services consult and/or wound management as needed  Outcome: Progressing

## 2019-07-02 NOTE — OB LABOR/OXYTOCIN SAFETY PROGRESS
Labor Progress Note - Yusuf Romero 29 y o  female MRN: [id number]    Unit/Bed#: L&D 323-01 Encounter: [id number]    OB History    Para Term  AB Living   1 0 0 0 0 0   SAB TAB Ectopic Multiple Live Births   0 0 0 0 0     Gestational Age: 36w4d     Contraction Frequency (minutes): 3-4  Contraction Quality: Moderate  Tachysystole: No   Dilation: 6        Effacement (%): 100  Station: -1  Baseline Rate: 130 bpm  Fetal Heart Rate: 150 BPM  FHR Category: Category I     Oxytocin Safety Progress Check: Safety check completed    Notes/comments:      Pt exam shown as above, IUPC inserted   / cat 1 toco 3-4       Renay Mcmahon MD  27:11 PM

## 2019-07-02 NOTE — OB LABOR/OXYTOCIN SAFETY PROGRESS
Labor Progress Note - Quirino Fuentes 29 y o  female MRN: [id number]    Unit/Bed#: L&D 323-01 Encounter: [id number]    OB History    Para Term  AB Living   1 0 0 0 0 0   SAB TAB Ectopic Multiple Live Births   0 0 0 0 0     Gestational Age: 38w3d     Contraction Frequency (minutes): 1-3  Contraction Quality: Moderate  Tachysystole: No   Dilation: 10  Dilation Complete Date: 19  Dilation Complete Time: 1540  Effacement (%): 100  Station: 1  Baseline Rate: 140 bpm  Fetal Heart Rate: 150 BPM  FHR Category: Category I     Oxytocin Safety Progress Check: Safety check completed    Notes/comments:      Pt Is complete, FHT 170s and cat 2 , she started to push       Ritu Mosquera MD  6:61 AM

## 2019-07-02 NOTE — OB LABOR/OXYTOCIN SAFETY PROGRESS
Labor Progress Note - Andrea Pang 29 y o  female MRN: [id number]    Unit/Bed#: L&D 323-01 Encounter: [id number]    OB History    Para Term  AB Living   1 0 0 0 0 0   SAB TAB Ectopic Multiple Live Births   0 0 0 0 0     Gestational Age: 36w4d     Contraction Frequency (minutes): 4  Contraction Quality: Moderate  Tachysystole: No   Dilation: 5        Effacement (%): 100  Station: -2  Baseline Rate: 130 bpm  Fetal Heart Rate: 150 BPM  FHR Category: Category I     Oxytocin Safety Progress Check: Safety check completed    Notes/comments:      Pt is comfortable, AROM performed clear fluid appreciated, patient exam is shown as above       Alex Romano MD [id number] 3:64 PM

## 2019-07-02 NOTE — LACTATION NOTE
This note was copied from a baby's chart  Met with mother  Provided mother with Ready, Set, Baby booklet  Discussed Skin to Skin contact an benefits to mom and baby  Talked about the delay of the first bath until baby has adjusted  Spoke about the benefits of rooming in  Feeding on cue and what that means for recognizing infant's hunger  Avoidance of pacifiers for the first month discussed  Talked about exclusive breastfeeding for the first 6 months  Positioning and latch reviewed as well as showing images of other feeding positions  Discussed the properties of a good latch in any position  Reviewed hand/manual expression  Discussed s/s that baby is getting enough milk and some s/s that breastfeeding dyad may need further help  Gave information on common concerns, what to expect the first few weeks after delivery, preparing for other caregivers, and how partners can help  Resources for support also provided  Mother verbalized last breastfeeding went well  Enc to call for assistance as needed,phone # given

## 2019-07-02 NOTE — OB LABOR/OXYTOCIN SAFETY PROGRESS
Oxytocin Safety Progress Check Note - Albania Garcia 29 y o  female MRN: [id number]    Unit/Bed#: L&D 323-01 Encounter: [id number]    OB History    Para Term  AB Living   1 0 0 0 0 0   SAB TAB Ectopic Multiple Live Births   0 0 0 0 0     Gestational Age: 38w3d     Contraction Frequency (minutes): 2-5  Contraction Quality: Moderate  Tachysystole: No   Dilation: 8-9        Effacement (%): 100  Station: 0  Baseline Rate: 145 bpm  Fetal Heart Rate: 150 BPM  FHR Category: Category I     Oxytocin Safety Progress Check: Safety check completed    Notes/comments:   Pt is comfortable, SVE shown as above, she has progressed spontaneously, FHT there were couple of variable decels probably quick progression and descend          Ailyn Bowman MD  24:09 AM

## 2019-07-03 PROCEDURE — 99024 POSTOP FOLLOW-UP VISIT: CPT | Performed by: OBSTETRICS & GYNECOLOGY

## 2019-07-03 RX ADMIN — IBUPROFEN 600 MG: 600 TABLET ORAL at 09:06

## 2019-07-03 RX ADMIN — DOCUSATE SODIUM 100 MG: 100 CAPSULE, LIQUID FILLED ORAL at 17:34

## 2019-07-03 RX ADMIN — IBUPROFEN 600 MG: 600 TABLET ORAL at 18:31

## 2019-07-03 NOTE — PLAN OF CARE
Problem: PAIN - ADULT  Goal: Verbalizes/displays adequate comfort level or baseline comfort level  Description  Interventions:  - Encourage patient to monitor pain and request assistance  - Assess pain using appropriate pain scale  - Administer analgesics based on type and severity of pain and evaluate response  - Implement non-pharmacological measures as appropriate and evaluate response  - Consider cultural and social influences on pain and pain management  - Notify physician/advanced practitioner if interventions unsuccessful or patient reports new pain  Outcome: Progressing     Problem: INFECTION - ADULT  Goal: Absence or prevention of progression during hospitalization  Description  INTERVENTIONS:  - Assess and monitor for signs and symptoms of infection  - Monitor lab/diagnostic results  - Monitor all insertion sites, i e  indwelling lines, tubes, and drains  - Monitor endotracheal (as able) and nasal secretions for changes in amount and color  - [place] appropriate cooling/warming therapies per order  - Administer medications as ordered  - Instruct and encourage patient and family to use good hand hygiene technique  - Identify and instruct in appropriate isolation precautions for identified infection/condition  Outcome: Progressing  Goal: Absence of fever/infection during neutropenic period  Description  INTERVENTIONS:  - Monitor WBC  - Implement neutropenic guidelines  Outcome: Progressing     Problem: SAFETY ADULT  Goal: Patient will remain free of falls  Description  INTERVENTIONS:  - Assess patient frequently for physical needs  -  Identify cognitive and physical deficits and behaviors that affect risk of falls    -  [place] fall precautions as indicated by assessment   - Educate patient/family on patient safety including physical limitations  - Instruct patient to call for assistance with activity based on assessment  - Modify environment to reduce risk of injury  - Consider OT/PT consult to assist with strengthening/mobility  Outcome: Progressing  Goal: Maintain or return to baseline ADL function  Description  INTERVENTIONS:  -  Assess patient's ability to carry out ADLs; assess patient's baseline for ADL function and identify physical deficits which impact ability to perform ADLs (bathing, care of mouth/teeth, toileting, grooming, dressing, etc )  - Assess/evaluate cause of self-care deficits   - Assess range of motion  - Assess patient's mobility; develop plan if impaired  - Assess patient's need for assistive devices and provide as appropriate  - Encourage maximum independence but intervene and supervise when necessary  ¯ Involve family in performance of ADLs  ¯ Assess for home care needs following discharge   ¯ Request OT consult to assist with ADL evaluation and planning for discharge  ¯ Provide patient education as appropriate  Outcome: Progressing  Goal: Maintain or return mobility status to optimal level  Description  INTERVENTIONS:  - Assess patient's baseline mobility status (ambulation, transfers, stairs, etc )    - Identify cognitive and physical deficits and behaviors that affect mobility  - Identify mobility aids required to assist with transfers and/or ambulation (gait belt, sit-to-stand, lift, walker, cane, etc )  - [place] fall precautions as indicated by assessment  - Record patient progress and toleration of activity level on Mobility SBAR; progress patient to next Phase/Stage  - Instruct patient to call for assistance with activity based on assessment  - Request Rehabilitation consult to assist with strengthening/weightbearing, etc   Outcome: Progressing     Problem: Knowledge Deficit  Goal: Patient/family/caregiver demonstrates understanding of disease process, treatment plan, medications, and discharge instructions  Description  Complete learning assessment and assess knowledge base    Interventions:  - Provide teaching at level of understanding  - Provide teaching via preferred learning methods  Outcome: Progressing     Problem: DISCHARGE PLANNING  Goal: Discharge to home or other facility with appropriate resources  Description  INTERVENTIONS:  - Identify barriers to discharge w/patient and caregiver  - Arrange for needed discharge resources and transportation as appropriate  - Identify discharge learning needs (meds, wound care, etc )  - Arrange for interpretive services to assist at discharge as needed  - Refer to Case Management Department for coordinating discharge planning if the patient needs post-hospital services based on physician/advanced practitioner order or complex needs related to functional status, cognitive ability, or social support system  Outcome: Progressing     Problem: POSTPARTUM  Goal: Experiences normal postpartum course  Description  INTERVENTIONS:  - Monitor maternal vital signs  - Assess uterine involution and lochia  Outcome: Progressing  Goal: Appropriate maternal -  bonding  Description  INTERVENTIONS:  - Identify family support  - Assess for appropriate maternal/infant bonding   -Encourage maternal/infant bonding opportunities  - Referral to  or  as needed  Outcome: Progressing  Goal: Establishment of infant feeding pattern  Description  INTERVENTIONS:  - Assess breast/bottle feeding  - Refer to lactation as needed  Outcome: Progressing  Goal: Incision(s), wounds(s) or drain site(s) healing without S/S of infection  Description  INTERVENTIONS  - Assess and document risk factors for skin impairment   - Assess and document dressing, incision, wound bed, drain sites and surrounding tissue  - Initiate Nutrition services consult and/or wound management as needed  Outcome: Progressing

## 2019-07-03 NOTE — CASE MANAGEMENT
The patient requested a Spectra S2 breast pump  Her insurance accepted the referral and the breast pump was delivered to the patient

## 2019-07-03 NOTE — PLAN OF CARE
Problem: PAIN - ADULT  Goal: Verbalizes/displays adequate comfort level or baseline comfort level  Description  Interventions:  - Encourage patient to monitor pain and request assistance  - Assess pain using appropriate pain scale  - Administer analgesics based on type and severity of pain and evaluate response  - Implement non-pharmacological measures as appropriate and evaluate response  - Consider cultural and social influences on pain and pain management  - Notify physician/advanced practitioner if interventions unsuccessful or patient reports new pain  Outcome: Progressing     Problem: INFECTION - ADULT  Goal: Absence or prevention of progression during hospitalization  Description  INTERVENTIONS:  - Assess and monitor for signs and symptoms of infection  - Monitor lab/diagnostic results  - Monitor all insertion sites, i e  indwelling lines, tubes, and drains  - Monitor endotracheal (as able) and nasal secretions for changes in amount and color  - [place] appropriate cooling/warming therapies per order  - Administer medications as ordered  - Instruct and encourage patient and family to use good hand hygiene technique  - Identify and instruct in appropriate isolation precautions for identified infection/condition  Outcome: Progressing  Goal: Absence of fever/infection during neutropenic period  Description  INTERVENTIONS:  - Monitor WBC  - Implement neutropenic guidelines  Outcome: Progressing     Problem: SAFETY ADULT  Goal: Patient will remain free of falls  Description  INTERVENTIONS:  - Assess patient frequently for physical needs  -  Identify cognitive and physical deficits and behaviors that affect risk of falls    -  [place] fall precautions as indicated by assessment   - Educate patient/family on patient safety including physical limitations  - Instruct patient to call for assistance with activity based on assessment  - Modify environment to reduce risk of injury  - Consider OT/PT consult to assist with strengthening/mobility  Outcome: Progressing  Goal: Maintain or return to baseline ADL function  Description  INTERVENTIONS:  -  Assess patient's ability to carry out ADLs; assess patient's baseline for ADL function and identify physical deficits which impact ability to perform ADLs (bathing, care of mouth/teeth, toileting, grooming, dressing, etc )  - Assess/evaluate cause of self-care deficits   - Assess range of motion  - Assess patient's mobility; develop plan if impaired  - Assess patient's need for assistive devices and provide as appropriate  - Encourage maximum independence but intervene and supervise when necessary  ¯ Involve family in performance of ADLs  ¯ Assess for home care needs following discharge   ¯ Request OT consult to assist with ADL evaluation and planning for discharge  ¯ Provide patient education as appropriate  Outcome: Progressing  Goal: Maintain or return mobility status to optimal level  Description  INTERVENTIONS:  - Assess patient's baseline mobility status (ambulation, transfers, stairs, etc )    - Identify cognitive and physical deficits and behaviors that affect mobility  - Identify mobility aids required to assist with transfers and/or ambulation (gait belt, sit-to-stand, lift, walker, cane, etc )  - [place] fall precautions as indicated by assessment  - Record patient progress and toleration of activity level on Mobility SBAR; progress patient to next Phase/Stage  - Instruct patient to call for assistance with activity based on assessment  - Request Rehabilitation consult to assist with strengthening/weightbearing, etc   Outcome: Progressing     Problem: Knowledge Deficit  Goal: Patient/family/caregiver demonstrates understanding of disease process, treatment plan, medications, and discharge instructions  Description  Complete learning assessment and assess knowledge base    Interventions:  - Provide teaching at level of understanding  - Provide teaching via preferred learning methods  Outcome: Progressing     Problem: DISCHARGE PLANNING  Goal: Discharge to home or other facility with appropriate resources  Description  INTERVENTIONS:  - Identify barriers to discharge w/patient and caregiver  - Arrange for needed discharge resources and transportation as appropriate  - Identify discharge learning needs (meds, wound care, etc )  - Arrange for interpretive services to assist at discharge as needed  - Refer to Case Management Department for coordinating discharge planning if the patient needs post-hospital services based on physician/advanced practitioner order or complex needs related to functional status, cognitive ability, or social support system  Outcome: Progressing     Problem: POSTPARTUM  Goal: Experiences normal postpartum course  Description  INTERVENTIONS:  - Monitor maternal vital signs  - Assess uterine involution and lochia  Outcome: Progressing  Goal: Appropriate maternal -  bonding  Description  INTERVENTIONS:  - Identify family support  - Assess for appropriate maternal/infant bonding   -Encourage maternal/infant bonding opportunities  - Referral to  or  as needed  Outcome: Progressing  Goal: Establishment of infant feeding pattern  Description  INTERVENTIONS:  - Assess breast/bottle feeding  - Refer to lactation as needed  Outcome: Progressing  Goal: Incision(s), wounds(s) or drain site(s) healing without S/S of infection  Description  INTERVENTIONS  - Assess and document risk factors for skin impairment   - Assess and document dressing, incision, wound bed, drain sites and surrounding tissue  - Initiate Nutrition services consult and/or wound management as needed  Outcome: Progressing

## 2019-07-03 NOTE — PROGRESS NOTES
Progress Note - OB/GYN   Marylen Cobbs 29 y o  female MRN: [id number]  Unit/Bed#: L&D 311-01 Encounter: [id number]    Assessment:  Post partum Day #1 s/p , stable, baby in room    Plan:  1) Contraception   - Mirena IUD in 6 weeks, declines bridge    2) Continue routine post partum care   - Encourage ambulation   - Encourage breastfeeding   - Anticipate discharge home tomorrow     Subjective/Objective   Chief Complaint:     Post delivery  Patient is doing well  Lochia WNL  Pain well controlled  Had question about vaginal stitches, reports they are irritating  Explained to patient that they dissolve on their own  Subjective:     Pain: yes, cramping, improved with meds  Tolerating PO: yes  Voiding: yes  Ambulating: yes  Breastfeeding:  yes  Chest pain: no  Shortness of breath: no  Leg pain: no  Lochia: WNL    Objective:     Vitals: /87 (BP Location: Left arm)   Pulse 76   Temp 97 9 °F (36 6 °C) (Oral)   Resp 18   Ht 5' 8" (1 727 m)   Wt 91 6 kg (202 lb)   LMP 10/06/2018 (Exact Date)   SpO2 100%   Breastfeeding? Yes   BMI 30 71 kg/m²       Intake/Output Summary (Last 24 hours) at 7/3/2019 0609  Last data filed at 2019 1236  Gross per 24 hour   Intake --   Output 1400 ml   Net -1400 ml       Lab Results   Component Value Date    WBC 12 56 (H) 2019    HGB 13 1 2019    HCT 40 0 2019    MCV 88 2019     2019       Physical Exam:     Gen: AAOx3, NAD  CV: RRR  Lungs: CTA b/l  Abd: Soft, non-tender, non-distended, no rebound or guarding  Uterine fundus firm and non-tender, 1 cm below the umbilicus     Ext: Non tender        Venu Cain MD  OB/GYN PGY-2  7/3/2019  6:09 AM

## 2019-07-04 VITALS
SYSTOLIC BLOOD PRESSURE: 106 MMHG | WEIGHT: 202 LBS | HEART RATE: 74 BPM | DIASTOLIC BLOOD PRESSURE: 87 MMHG | OXYGEN SATURATION: 97 % | TEMPERATURE: 98.1 F | HEIGHT: 68 IN | RESPIRATION RATE: 16 BRPM | BODY MASS INDEX: 30.62 KG/M2

## 2019-07-04 PROCEDURE — 99024 POSTOP FOLLOW-UP VISIT: CPT | Performed by: OBSTETRICS & GYNECOLOGY

## 2019-07-04 RX ORDER — IBUPROFEN 600 MG/1
600 TABLET ORAL EVERY 6 HOURS PRN
Qty: 30 TABLET | Refills: 0 | Status: SHIPPED | OUTPATIENT
Start: 2019-07-04 | End: 2020-10-21 | Stop reason: ALTCHOICE

## 2019-07-04 RX ORDER — DOCUSATE SODIUM 100 MG/1
100 CAPSULE, LIQUID FILLED ORAL 2 TIMES DAILY
Qty: 10 CAPSULE | Refills: 0 | Status: SHIPPED | OUTPATIENT
Start: 2019-07-04 | End: 2020-10-21 | Stop reason: ALTCHOICE

## 2019-07-04 RX ORDER — ACETAMINOPHEN 325 MG/1
650 TABLET ORAL EVERY 6 HOURS PRN
Qty: 30 TABLET | Refills: 0 | Status: SHIPPED | OUTPATIENT
Start: 2019-07-04 | End: 2020-10-21 | Stop reason: ALTCHOICE

## 2019-07-04 RX ADMIN — DOCUSATE SODIUM 100 MG: 100 CAPSULE, LIQUID FILLED ORAL at 07:13

## 2019-07-04 RX ADMIN — IBUPROFEN 600 MG: 600 TABLET ORAL at 07:13

## 2019-07-04 NOTE — PLAN OF CARE
Problem: PAIN - ADULT  Goal: Verbalizes/displays adequate comfort level or baseline comfort level  Description  Interventions:  - Encourage patient to monitor pain and request assistance  - Assess pain using appropriate pain scale  - Administer analgesics based on type and severity of pain and evaluate response  - Implement non-pharmacological measures as appropriate and evaluate response  - Consider cultural and social influences on pain and pain management  - Notify physician/advanced practitioner if interventions unsuccessful or patient reports new pain  Outcome: Completed     Problem: INFECTION - ADULT  Goal: Absence or prevention of progression during hospitalization  Description  INTERVENTIONS:  - Assess and monitor for signs and symptoms of infection  - Monitor lab/diagnostic results  - Monitor all insertion sites, i e  indwelling lines, tubes, and drains  - Monitor endotracheal (as able) and nasal secretions for changes in amount and color  - [place] appropriate cooling/warming therapies per order  - Administer medications as ordered  - Instruct and encourage patient and family to use good hand hygiene technique  - Identify and instruct in appropriate isolation precautions for identified infection/condition  Outcome: Completed  Goal: Absence of fever/infection during neutropenic period  Description  INTERVENTIONS:  - Monitor WBC  - Implement neutropenic guidelines  Outcome: Completed     Problem: SAFETY ADULT  Goal: Patient will remain free of falls  Description  INTERVENTIONS:  - Assess patient frequently for physical needs  -  Identify cognitive and physical deficits and behaviors that affect risk of falls    -  [place] fall precautions as indicated by assessment   - Educate patient/family on patient safety including physical limitations  - Instruct patient to call for assistance with activity based on assessment  - Modify environment to reduce risk of injury  - Consider OT/PT consult to assist with strengthening/mobility  Outcome: Completed  Goal: Maintain or return to baseline ADL function  Description  INTERVENTIONS:  -  Assess patient's ability to carry out ADLs; assess patient's baseline for ADL function and identify physical deficits which impact ability to perform ADLs (bathing, care of mouth/teeth, toileting, grooming, dressing, etc )  - Assess/evaluate cause of self-care deficits   - Assess range of motion  - Assess patient's mobility; develop plan if impaired  - Assess patient's need for assistive devices and provide as appropriate  - Encourage maximum independence but intervene and supervise when necessary  ¯ Involve family in performance of ADLs  ¯ Assess for home care needs following discharge   ¯ Request OT consult to assist with ADL evaluation and planning for discharge  ¯ Provide patient education as appropriate  Outcome: Completed  Goal: Maintain or return mobility status to optimal level  Description  INTERVENTIONS:  - Assess patient's baseline mobility status (ambulation, transfers, stairs, etc )    - Identify cognitive and physical deficits and behaviors that affect mobility  - Identify mobility aids required to assist with transfers and/or ambulation (gait belt, sit-to-stand, lift, walker, cane, etc )  - [place] fall precautions as indicated by assessment  - Record patient progress and toleration of activity level on Mobility SBAR; progress patient to next Phase/Stage  - Instruct patient to call for assistance with activity based on assessment  - Request Rehabilitation consult to assist with strengthening/weightbearing, etc   Outcome: Completed     Problem: Knowledge Deficit  Goal: Patient/family/caregiver demonstrates understanding of disease process, treatment plan, medications, and discharge instructions  Description  Complete learning assessment and assess knowledge base    Interventions:  - Provide teaching at level of understanding  - Provide teaching via preferred learning methods  Outcome: Completed     Problem: DISCHARGE PLANNING  Goal: Discharge to home or other facility with appropriate resources  Description  INTERVENTIONS:  - Identify barriers to discharge w/patient and caregiver  - Arrange for needed discharge resources and transportation as appropriate  - Identify discharge learning needs (meds, wound care, etc )  - Arrange for interpretive services to assist at discharge as needed  - Refer to Case Management Department for coordinating discharge planning if the patient needs post-hospital services based on physician/advanced practitioner order or complex needs related to functional status, cognitive ability, or social support system  Outcome: Completed     Problem: POSTPARTUM  Goal: Experiences normal postpartum course  Description  INTERVENTIONS:  - Monitor maternal vital signs  - Assess uterine involution and lochia  Outcome: Completed  Goal: Appropriate maternal -  bonding  Description  INTERVENTIONS:  - Identify family support  - Assess for appropriate maternal/infant bonding   -Encourage maternal/infant bonding opportunities  - Referral to  or  as needed  Outcome: Completed  Goal: Establishment of infant feeding pattern  Description  INTERVENTIONS:  - Assess breast/bottle feeding  - Refer to lactation as needed  Outcome: Completed  Goal: Incision(s), wounds(s) or drain site(s) healing without S/S of infection  Description  INTERVENTIONS  - Assess and document risk factors for skin impairment   - Assess and document dressing, incision, wound bed, drain sites and surrounding tissue  - Initiate Nutrition services consult and/or wound management as needed  Outcome: Completed

## 2019-07-04 NOTE — PROGRESS NOTES
Progress Note - OB/GYN   Tessie Marcelo 29 y o  female MRN: [id number]  Unit/Bed#: L&D 311-01 Encounter: [id number]    Assessment:  PP#2 s/p Spontaneous Vaginal Delivery, stable    Plan:  Postpartum care   Encourage ambulation  Encourage breastfeeding    Contraception  She desired to get MIRENA  IUD  She does not want any bridging method    Disposition  Anticipate discharge today    Subjective/Objective   Chief Complaint:     PP#2 s/p Spontaneous Vaginal Delivery    Subjective:     Pain:  Cramping   Tolerating PO: yes  Voiding: yes  [place]: yes  BM: yes  Ambulating: yes  Breastfeeding: Breastfeeding  Chest pain: no  Shortness of breath: no  Leg pain: no  Lochia:  Minimal    Objective:     Vitals:  Vitals:    07/03/19 0700 07/03/19 1500 07/03/19 1920 07/03/19 2300   BP: 107/87 (!) 88/68 118/89 109/91   BP Location:  Left arm  Right arm   Pulse: 76 80 73 87   Resp: 18 16 16 18   Temp: 97 9 °F (36 6 °C) 98 °F (36 7 °C) 98 4 °F (36 9 °C) 98 5 °F (36 9 °C)   TempSrc:  Oral Oral Oral   SpO2:  99%  97%   Weight:       Height:           Physical Exam:     Physical Exam   Constitutional: She is oriented to person, place, and time  She appears well-developed and well-nourished  No distress  Cardiovascular: Normal rate and regular rhythm  Pulmonary/Chest: Effort normal    Abdominal: Soft  Musculoskeletal: Normal range of motion  Neurological: She is alert and oriented to person, place, and time  Skin: Skin is warm  She is not diaphoretic  Psychiatric: She has a normal mood and affect  Her behavior is normal      Uterine fundus firm and non-tender, -2 cm below the umbilicus       Lab, Imaging and other studies: I have personally reviewed pertinent reports        Lab Results   Component Value Date    WBC 12 56 (H) 07/01/2019    HGB 13 1 07/01/2019    HCT 40 0 07/01/2019    MCV 88 07/01/2019     07/01/2019               Niko oMoney MD  [birth year]

## 2019-07-04 NOTE — PLAN OF CARE
Problem: PAIN - ADULT  Goal: Verbalizes/displays adequate comfort level or baseline comfort level  Description  Interventions:  - Encourage patient to monitor pain and request assistance  - Assess pain using appropriate pain scale  - Administer analgesics based on type and severity of pain and evaluate response  - Implement non-pharmacological measures as appropriate and evaluate response  - Consider cultural and social influences on pain and pain management  - Notify physician/advanced practitioner if interventions unsuccessful or patient reports new pain  Outcome: Progressing     Problem: INFECTION - ADULT  Goal: Absence or prevention of progression during hospitalization  Description  INTERVENTIONS:  - Assess and monitor for signs and symptoms of infection  - Monitor lab/diagnostic results  - Monitor all insertion sites, i e  indwelling lines, tubes, and drains  - Monitor endotracheal (as able) and nasal secretions for changes in amount and color  - [place] appropriate cooling/warming therapies per order  - Administer medications as ordered  - Instruct and encourage patient and family to use good hand hygiene technique  - Identify and instruct in appropriate isolation precautions for identified infection/condition  Outcome: Progressing  Goal: Absence of fever/infection during neutropenic period  Description  INTERVENTIONS:  - Monitor WBC  - Implement neutropenic guidelines  Outcome: Progressing     Problem: SAFETY ADULT  Goal: Patient will remain free of falls  Description  INTERVENTIONS:  - Assess patient frequently for physical needs  -  Identify cognitive and physical deficits and behaviors that affect risk of falls    -  [place] fall precautions as indicated by assessment   - Educate patient/family on patient safety including physical limitations  - Instruct patient to call for assistance with activity based on assessment  - Modify environment to reduce risk of injury  - Consider OT/PT consult to assist with strengthening/mobility  Outcome: Progressing  Goal: Maintain or return to baseline ADL function  Description  INTERVENTIONS:  -  Assess patient's ability to carry out ADLs; assess patient's baseline for ADL function and identify physical deficits which impact ability to perform ADLs (bathing, care of mouth/teeth, toileting, grooming, dressing, etc )  - Assess/evaluate cause of self-care deficits   - Assess range of motion  - Assess patient's mobility; develop plan if impaired  - Assess patient's need for assistive devices and provide as appropriate  - Encourage maximum independence but intervene and supervise when necessary  ¯ Involve family in performance of ADLs  ¯ Assess for home care needs following discharge   ¯ Request OT consult to assist with ADL evaluation and planning for discharge  ¯ Provide patient education as appropriate  Outcome: Progressing  Goal: Maintain or return mobility status to optimal level  Description  INTERVENTIONS:  - Assess patient's baseline mobility status (ambulation, transfers, stairs, etc )    - Identify cognitive and physical deficits and behaviors that affect mobility  - Identify mobility aids required to assist with transfers and/or ambulation (gait belt, sit-to-stand, lift, walker, cane, etc )  - [place] fall precautions as indicated by assessment  - Record patient progress and toleration of activity level on Mobility SBAR; progress patient to next Phase/Stage  - Instruct patient to call for assistance with activity based on assessment  - Request Rehabilitation consult to assist with strengthening/weightbearing, etc   Outcome: Progressing     Problem: Knowledge Deficit  Goal: Patient/family/caregiver demonstrates understanding of disease process, treatment plan, medications, and discharge instructions  Description  Complete learning assessment and assess knowledge base    Interventions:  - Provide teaching at level of understanding  - Provide teaching via preferred learning methods  Outcome: Progressing     Problem: DISCHARGE PLANNING  Goal: Discharge to home or other facility with appropriate resources  Description  INTERVENTIONS:  - Identify barriers to discharge w/patient and caregiver  - Arrange for needed discharge resources and transportation as appropriate  - Identify discharge learning needs (meds, wound care, etc )  - Arrange for interpretive services to assist at discharge as needed  - Refer to Case Management Department for coordinating discharge planning if the patient needs post-hospital services based on physician/advanced practitioner order or complex needs related to functional status, cognitive ability, or social support system  Outcome: Progressing     Problem: POSTPARTUM  Goal: Experiences normal postpartum course  Description  INTERVENTIONS:  - Monitor maternal vital signs  - Assess uterine involution and lochia  Outcome: Progressing  Goal: Appropriate maternal -  bonding  Description  INTERVENTIONS:  - Identify family support  - Assess for appropriate maternal/infant bonding   -Encourage maternal/infant bonding opportunities  - Referral to  or  as needed  Outcome: Progressing  Goal: Establishment of infant feeding pattern  Description  INTERVENTIONS:  - Assess breast/bottle feeding  - Refer to lactation as needed  Outcome: Progressing  Goal: Incision(s), wounds(s) or drain site(s) healing without S/S of infection  Description  INTERVENTIONS  - Assess and document risk factors for skin impairment   - Assess and document dressing, incision, wound bed, drain sites and surrounding tissue  - Initiate Nutrition services consult and/or wound management as needed  Outcome: Progressing

## 2019-07-10 LAB — PLACENTA IN STORAGE: NORMAL

## 2019-07-24 ENCOUNTER — POSTPARTUM VISIT (OUTPATIENT)
Dept: OBGYN CLINIC | Facility: CLINIC | Age: 29
End: 2019-07-24

## 2019-07-24 VITALS
HEART RATE: 91 BPM | SYSTOLIC BLOOD PRESSURE: 126 MMHG | WEIGHT: 175.8 LBS | DIASTOLIC BLOOD PRESSURE: 85 MMHG | BODY MASS INDEX: 26.73 KG/M2

## 2019-07-24 DIAGNOSIS — IMO0001 CONTRACEPTION: ICD-10-CM

## 2019-07-24 PROBLEM — Z34.93 PRENATAL CARE IN THIRD TRIMESTER: Status: RESOLVED | Noted: 2019-01-30 | Resolved: 2019-07-24

## 2019-07-24 PROBLEM — Z3A.38 38 WEEKS GESTATION OF PREGNANCY: Status: RESOLVED | Noted: 2019-01-30 | Resolved: 2019-07-24

## 2019-07-24 PROBLEM — B95.1 GROUP BETA STREP POSITIVE: Status: RESOLVED | Noted: 2019-06-24 | Resolved: 2019-07-24

## 2019-07-24 PROBLEM — O47.9 UTERINE CONTRACTIONS DURING PREGNANCY: Status: RESOLVED | Noted: 2019-07-01 | Resolved: 2019-07-24

## 2019-07-24 PROCEDURE — 99213 OFFICE O/P EST LOW 20 MIN: CPT | Performed by: OBSTETRICS & GYNECOLOGY

## 2019-07-24 NOTE — PROGRESS NOTES
Subjective     Tadeo Ryan is a 29 y o  y o  female  who presents for a postpartum visit  She delivered via  2019, female  7lb 6oz, [address]   Her postpartum course was uncomplicated  Her postpartum course has been uncomplicated  She is currently breastfeeding successfully  She reports good family support and is extremely happy with her baby  She denies symptoms of postpartum depression  Bleeding is absent  Bowel function is normal  Bladder function is normal  Patient is not sexually active  She is currently not sexually active and does not plan to be until Mirena insertion  Review of Systems  Pertinent items are noted in HPI       Objective     Vitals:    19 1406   BP: 126/85   Pulse: 91     Assessment/Plan     Patient is a 31yo  female here for postpartum visit  Doing well no issues  Will return on  for Mirena insertion (6 weeks postpartum)    Discussed with Dr Marcia Houston

## 2019-07-24 NOTE — PATIENT INSTRUCTIONS
Levonorgestrel (Into the uterus)   Levonorgestrel (ccv-lgc-hkg-ANGEL-trel)  Prevents pregnancy and treats heavy menstrual bleeding  This is an intrauterine device (IUD), which is a reversible form of birth control  This IUD slowly releases levonorgestrel, a hormone  Brand Name(s): Marval Plough, Mirena, [place]   There may be other brand names for this medicine  When This Medicine Should Not Be Used: This device is not right for everyone  Do not use it if you had an allergic reaction to levonorgestrel, or you are pregnant  How to Use This Medicine:   Device  · The IUD is usually inserted by your doctor during your monthly period  You will need to see your doctor 4 to 6 weeks after the IUD is placed and then once a year  · Your IUD has a string or "tail" that is made of plastic thread  About one or two inches of this string hangs into your vagina  You cannot see this string, and it will not cause problems when you have sex  Check your IUD after each monthly period  You may not be protected against pregnancy if you cannot feel the string or if you feel plastic  Do the following to check the placement of your IUD:  [facility] your hands with soap and warm water  Dry them with a clean towel  ¨ Bend your knees and squat low to the ground  ¨ Gently put your index finger high inside your vagina  The cervix is at the top of the vagina  Find the IUD string coming from your cervix  Never pull on the string  You should not be able to feel the plastic of the IUD itself  Wash your hands after you are done checking your IUD string  · Your doctor will need to replace your IUD after 3 years for [facility] or [place], or after 5 years for [facility] or [place] [address]  You will also need to have it replaced if it comes out of your uterus  Drugs and Foods to Avoid:   Ask your doctor or pharmacist before using any other medicine, including over-the-counter medicines, vitamins, and herbal products    · Some medicines can affect how this device works  Tell your doctor if you are using a blood thinner (including warfarin)  Warnings While Using This Medicine:   · Tell your doctor if you are breastfeeding, or you have had a baby, miscarriage, or  in the past 3 months  Tell your doctor if you have liver disease (including tumor or cancer), breast cancer, heart or blood circulation problems, including a history of heart valve problems, heart disease, blood clotting problems, stroke, heart attack, or high blood pressure  Tell your doctor if you have problems with your immune system or have had surgery on your female organs (especially fallopian tubes)  · Tell your doctor if you have had any problems, infections, or other conditions that affected your reproductive system  There are many problems that could make an IUD a bad choice for you, including if you have fibroids, unexplained bleeding, a uterus that has an unusual shape, a recent infection, pelvic inflammatory disease, abnormal Pap test, ectopic pregnancy, cancer or suspected cancer, or an existing IUD  · There is a small chance that you could get pregnant when using an IUD, just as there is with any birth control  If you get pregnant, your doctor may remove your IUD to lower the risk of miscarriage or other problems  · This medicine may cause the following problems:  ¨ Increased risk of ectopic pregnancy (pregnancy outside the uterus)  ¨ Increased risk of a serious infection called pelvic inflammatory disease (PID)  ¨ Increased risk for ovarian cysts  ¨ Perforation (hole in the wall of your uterus), which can damage other organs  · You might have some spotting and cramping during the first weeks after the IUD has been inserted  These symptoms should decrease or go away within a few weeks up to 6 months  · You could have less bleeding or even stop having periods by the end of the first year   Call your doctor if you have a change from the regular bleeding pattern after you have had your IUD for awhile, such as more bleeding or if you miss a period (and you were having periods even with your IUD)  · An IUD can slip partly or all of the way out of your uterus  If this happens, use condoms or another form of birth control, and call your doctor right away  · This IUD will not protect you from HIV/AIDS, herpes, or other sexually transmitted diseases  · If you have the Viri [place] or [place], tell your healthcare provider before you have an MRI test   Possible Side Effects While Using This Medicine:   Call your doctor right away if you notice any of these side effects:  · Allergic reaction: Itching or hives, swelling in your face or hands, swelling or tingling in your mouth or throat, chest tightness, trouble breathing  · Chest pain, problems with speech or walking, numbness or weakness in your arm or leg or on one side of your body  · Heavy bleeding from your vagina  · Pain during sex, or if your partner feels the hard plastic of the IUD during sex  · Severe headache, vision changes  · Stomach or pelvic pain, tenderness, or cramping that is sudden or severe  · Vaginal discharge has a bad smell, fever, chills, sores on your genitals  · Yellow skin or eyes  If you notice these less serious side effects, talk with your doctor:   · Acne or other skin changes  · Breast pain  · Change in bleeding pattern after the first few months  · Dizziness or lightheadedness after IUD is placed  · Mild itching around your vagina and genitals  If you notice other side effects that you think are caused by this medicine, tell your doctor  Call your doctor for medical advice about side effects  You may report side effects to FDA at 6-171-FDA-4381  © 2017 [address] Information is for End User's use only and may not be sold, redistributed or otherwise used for commercial purposes  The above information is an  only  It is not intended as medical advice for individual conditions or treatments  Talk to your doctor, nurse or pharmacist before following any medical regimen to see if it is safe and effective for you

## 2019-08-14 ENCOUNTER — PROCEDURE VISIT (OUTPATIENT)
Dept: OBGYN CLINIC | Facility: CLINIC | Age: 29
End: 2019-08-14

## 2019-08-14 VITALS
DIASTOLIC BLOOD PRESSURE: 79 MMHG | HEIGHT: 68 IN | BODY MASS INDEX: 26.07 KG/M2 | SYSTOLIC BLOOD PRESSURE: 120 MMHG | HEART RATE: 80 BPM | WEIGHT: 172 LBS

## 2019-08-14 DIAGNOSIS — Z30.09 COUNSELING FOR BIRTH CONTROL, INTRAUTERINE DEVICE: Primary | ICD-10-CM

## 2019-08-14 LAB — SL AMB POCT URINE HCG: NEGATIVE

## 2019-08-14 PROCEDURE — 58300 INSERT INTRAUTERINE DEVICE: CPT | Performed by: OBSTETRICS & GYNECOLOGY

## 2019-08-14 PROCEDURE — 99213 OFFICE O/P EST LOW 20 MIN: CPT | Performed by: OBSTETRICS & GYNECOLOGY

## 2019-08-14 PROCEDURE — 81025 URINE PREGNANCY TEST: CPT | Performed by: OBSTETRICS & GYNECOLOGY

## 2019-08-14 NOTE — PATIENT INSTRUCTIONS
Levonorgestrel (Into the uterus)   Levonorgestrel (xvz-ilg-mki-ANGEL-trel)  Prevents pregnancy and treats heavy menstrual bleeding  This is an intrauterine device (IUD), which is a reversible form of birth control  This IUD slowly releases levonorgestrel, a hormone  Brand Name(s): Eleazarangela Ronquillo, Mirena, [place]   There may be other brand names for this medicine  When This Medicine Should Not Be Used: This device is not right for everyone  Do not use it if you had an allergic reaction to levonorgestrel, or you are pregnant  How to Use This Medicine:   Device  · The IUD is usually inserted by your doctor during your monthly period  You will need to see your doctor 4 to 6 weeks after the IUD is placed and then once a year  · Your IUD has a string or "tail" that is made of plastic thread  About one or two inches of this string hangs into your vagina  You cannot see this string, and it will not cause problems when you have sex  Check your IUD after each monthly period  You may not be protected against pregnancy if you cannot feel the string or if you feel plastic  Do the following to check the placement of your IUD:  [facility] [place] your hands with soap and warm water  Dry them with a clean towel  ¨ Bend your knees and squat low to the ground  ¨ Gently put your index finger high inside your vagina  The cervix is at the top of the vagina  Find the IUD string coming from your cervix  Never pull on the string  You should not be able to feel the plastic of the IUD itself  Wash your hands after you are done checking your IUD string  · Your doctor will need to replace your IUD after 3 years for [facility] or [place], or after 5 years for [facility] or [place] [address]  You will also need to have it replaced if it comes out of your uterus  Drugs and Foods to Avoid:   Ask your doctor or pharmacist before using any other medicine, including over-the-counter medicines, vitamins, and herbal products    · Some medicines can affect how this device works  Tell your doctor if you are using a blood thinner (including warfarin)  Warnings While Using This Medicine:   · Tell your doctor if you are breastfeeding, or you have had a baby, miscarriage, or  in the past 3 months  Tell your doctor if you have liver disease (including tumor or cancer), breast cancer, heart or blood circulation problems, including a history of heart valve problems, heart disease, blood clotting problems, stroke, heart attack, or high blood pressure  Tell your doctor if you have problems with your immune system or have had surgery on your female organs (especially fallopian tubes)  · Tell your doctor if you have had any problems, infections, or other conditions that affected your reproductive system  There are many problems that could make an IUD a bad choice for you, including if you have fibroids, unexplained bleeding, a uterus that has an unusual shape, a recent infection, pelvic inflammatory disease, abnormal Pap test, ectopic pregnancy, cancer or suspected cancer, or an existing IUD  · There is a small chance that you could get pregnant when using an IUD, just as there is with any birth control  If you get pregnant, your doctor may remove your IUD to lower the risk of miscarriage or other problems  · This medicine may cause the following problems:  ¨ Increased risk of ectopic pregnancy (pregnancy outside the uterus)  ¨ Increased risk of a serious infection called pelvic inflammatory disease (PID)  ¨ Increased risk for ovarian cysts  ¨ Perforation (hole in the wall of your uterus), which can damage other organs  · You might have some spotting and cramping during the first weeks after the IUD has been inserted  These symptoms should decrease or go away within a few weeks up to 6 months  · You could have less bleeding or even stop having periods by the end of the first year   Call your doctor if you have a change from the regular bleeding pattern after you have had your IUD for awhile, such as more bleeding or if you miss a period (and you were having periods even with your IUD)  · An IUD can slip partly or all of the way out of your uterus  If this happens, use condoms or another form of birth control, and call your doctor right away  · This IUD will not protect you from HIV/AIDS, herpes, or other sexually transmitted diseases  · If you have the [place] Bowels or [place], tell your healthcare provider before you have an MRI test   Possible Side Effects While Using This Medicine:   Call your doctor right away if you notice any of these side effects:  · Allergic reaction: Itching or hives, swelling in your face or hands, swelling or tingling in your mouth or throat, chest tightness, trouble breathing  · Chest pain, problems with speech or walking, numbness or weakness in your arm or leg or on one side of your body  · Heavy bleeding from your vagina  · Pain during sex, or if your partner feels the hard plastic of the IUD during sex  · Severe headache, vision changes  · Stomach or pelvic pain, tenderness, or cramping that is sudden or severe  · Vaginal discharge has a bad smell, fever, chills, sores on your genitals  · Yellow skin or eyes  If you notice these less serious side effects, talk with your doctor:   · Acne or other skin changes  · Breast pain  · Change in bleeding pattern after the first few months  · Dizziness or lightheadedness after IUD is placed  · Mild itching around your vagina and genitals  If you notice other side effects that you think are caused by this medicine, tell your doctor  Call your doctor for medical advice about side effects  You may report side effects to FDA at 4-017-FDA-2409  © 2017 [address] Information is for End User's use only and may not be sold, redistributed or otherwise used for commercial purposes  The above information is an  only  It is not intended as medical advice for individual conditions or treatments  Talk to your doctor, nurse or pharmacist before following any medical regimen to see if it is safe and effective for you

## 2019-08-14 NOTE — PROGRESS NOTES
Iud insertions  Date/Time: 8/14/2019 3:01 PM  Performed by: Isabel Jordan DO  Authorized by: Michael Alas DO     Consent:     Consent obtained:  Written    Consent given by:  Patient    Procedure risks and benefits discussed: yes      Patient questions answered: yes      Patient agrees, verbalizes understanding, and wants to proceed: yes      Educational handouts given: yes      Instructions and paperwork completed: yes    Procedure:     Pelvic exam performed: yes      Negative urine pregnancy test: yes      Cervix cleaned and prepped: yes      Speculum placed in vagina: yes      Tenaculum applied to cervix: yes (RIng Forcep)      Uterus sounded: yes      Uterus sound depth (cm):  9    IUD inserted with no complications: yes      IUD type:  Mirena    Strings trimmed: yes    Post-procedure:     Patient tolerated procedure well: yes    Comments:      28 yo P1 for IUD placement  Without complications  RTC 4 weeks for string check

## 2019-09-11 ENCOUNTER — OFFICE VISIT (OUTPATIENT)
Dept: OBGYN CLINIC | Facility: CLINIC | Age: 29
End: 2019-09-11

## 2019-09-11 VITALS
HEIGHT: 68 IN | WEIGHT: 176 LBS | BODY MASS INDEX: 26.67 KG/M2 | DIASTOLIC BLOOD PRESSURE: 77 MMHG | SYSTOLIC BLOOD PRESSURE: 119 MMHG | HEART RATE: 69 BPM

## 2019-09-11 DIAGNOSIS — IMO0001 CONTRACEPTION: ICD-10-CM

## 2019-09-11 DIAGNOSIS — Z30.431 IUD CHECK UP: Primary | ICD-10-CM

## 2019-09-11 PROCEDURE — 99214 OFFICE O/P EST MOD 30 MIN: CPT | Performed by: NURSE PRACTITIONER

## 2019-09-11 NOTE — PROGRESS NOTES
Assessment/Plan:         Diagnoses and all orders for this visit:    IUD check up    Contraception        Plan  Annual GYN exam is due after 12/18/2019  Call with needs or concerns  Pt verbalized understanding of all discussed  Subjective:      Patient ID: Zhanna Wolff is a 29 y o  female  HPI   Pt presents for IUD check  Pt had a Mirena IUD inserted 8/14/2019  Pt describes minimal vaginal bleeding, occ  spotting  The following portions of the patient's history were reviewed and updated as appropriate: allergies, current medications, past family history, past medical history, past social history, past surgical history and problem list     Review of Systems      Pertinent items are note in the HPI    Objective:      /77   Pulse 69   Ht 5' 8" (1 727 m)   Wt 79 8 kg (176 lb)   LMP 08/14/2019   Breastfeeding?  Yes   BMI 26 76 kg/m²          Physical Exam    Alert and oriented  Denies pain  WNL Respiratory effort  Vulva negative lesions or erythema  Vagina WNL mucosa  Cervix negative lesions, Mirena IUD string noted at cervical os

## 2020-06-29 ENCOUNTER — TELEPHONE (OUTPATIENT)
Dept: OBGYN CLINIC | Facility: CLINIC | Age: 30
End: 2020-06-29

## 2020-07-09 ENCOUNTER — TELEPHONE (OUTPATIENT)
Dept: OBGYN CLINIC | Facility: CLINIC | Age: 30
End: 2020-07-09

## 2020-07-10 ENCOUNTER — ANNUAL EXAM (OUTPATIENT)
Dept: OBGYN CLINIC | Facility: CLINIC | Age: 30
End: 2020-07-10

## 2020-07-10 VITALS
DIASTOLIC BLOOD PRESSURE: 71 MMHG | BODY MASS INDEX: 27.95 KG/M2 | TEMPERATURE: 97.6 F | HEIGHT: 68 IN | HEART RATE: 69 BPM | WEIGHT: 184.4 LBS | SYSTOLIC BLOOD PRESSURE: 112 MMHG

## 2020-07-10 DIAGNOSIS — Z01.419 ENCOUNTER FOR ANNUAL ROUTINE GYNECOLOGICAL EXAMINATION: Primary | ICD-10-CM

## 2020-07-10 PROCEDURE — G0145 SCR C/V CYTO,THINLAYER,RESCR: HCPCS | Performed by: NURSE PRACTITIONER

## 2020-07-10 PROCEDURE — 99395 PREV VISIT EST AGE 18-39: CPT | Performed by: NURSE PRACTITIONER

## 2020-07-10 NOTE — PATIENT INSTRUCTIONS
PAP results will be available in 2 weeks  Call with needs or concerns  Retrun in 1 year    COVID-19 Instructions    If you are having any of the following:  Cough   Shortness of breath   Fever  If traveled within past 2 weeks internationally or to high risk [place] states  Or been in contact with someone that has     Please call either:   Your PCP office  [phone], option [phone]    They will screen you over the phone and direct you to the nearest appropriate testing location    DO NOT go to your PCP or OB office without calling first

## 2020-07-17 ENCOUNTER — TELEPHONE (OUTPATIENT)
Dept: OBGYN CLINIC | Facility: CLINIC | Age: 30
End: 2020-07-17

## 2020-07-17 PROBLEM — A59.9 TRICHOMONIASIS: Status: ACTIVE | Noted: 2020-07-17

## 2020-07-17 LAB
LAB AP GYN PRIMARY INTERPRETATION: NORMAL
Lab: NORMAL
PATH INTERP SPEC-IMP: NORMAL

## 2020-07-20 ENCOUNTER — TELEPHONE (OUTPATIENT)
Dept: OBGYN CLINIC | Facility: CLINIC | Age: 30
End: 2020-07-20

## 2020-07-24 ENCOUNTER — TELEPHONE (OUTPATIENT)
Dept: OBGYN CLINIC | Facility: CLINIC | Age: 30
End: 2020-07-24

## 2020-08-24 ENCOUNTER — OFFICE VISIT (OUTPATIENT)
Dept: OBGYN CLINIC | Facility: CLINIC | Age: 30
End: 2020-08-24

## 2020-08-24 VITALS
SYSTOLIC BLOOD PRESSURE: 118 MMHG | WEIGHT: 184.8 LBS | BODY MASS INDEX: 28.1 KG/M2 | TEMPERATURE: 97.8 F | HEART RATE: 68 BPM | DIASTOLIC BLOOD PRESSURE: 86 MMHG

## 2020-08-24 DIAGNOSIS — A59.9 TRICHOMONIASIS: Primary | ICD-10-CM

## 2020-08-24 DIAGNOSIS — B37.9 YEAST INFECTION: ICD-10-CM

## 2020-08-24 LAB
BV WHIFF TEST VAG QL: NEGATIVE
CLUE CELLS SPEC QL WET PREP: NEGATIVE
PH SMN: 4.5 [PH]
SL AMB POCT WET MOUNT: ABNORMAL
T VAGINALIS VAG QL WET PREP: ABNORMAL
YEAST VAG QL WET PREP: POSITIVE

## 2020-08-24 PROCEDURE — 87210 SMEAR WET MOUNT SALINE/INK: CPT | Performed by: NURSE PRACTITIONER

## 2020-08-24 PROCEDURE — 99213 OFFICE O/P EST LOW 20 MIN: CPT | Performed by: NURSE PRACTITIONER

## 2020-08-24 RX ORDER — METRONIDAZOLE 500 MG/1
TABLET ORAL
Qty: 4 TABLET | Refills: 0 | Status: SHIPPED | OUTPATIENT
Start: 2020-08-24 | End: 2020-08-24

## 2020-08-24 RX ORDER — FLUCONAZOLE 150 MG/1
150 TABLET ORAL ONCE
Qty: 1 TABLET | Refills: 1 | Status: SHIPPED | OUTPATIENT
Start: 2020-08-24 | End: 2020-08-24

## 2020-08-24 NOTE — PATIENT INSTRUCTIONS
Take Metronidazole as directed  Take Fluconazole as directed  Wear loose clothes and cotton underwear  Remember safe and condom use  Annual GYN exam is due in 1 year    COVID-19 Instructions    If you are having any of the following:  Cough   Shortness of breath   Fever  If traveled within past 2 weeks internationally or to high risk [place] states  Or been in contact with someone that has     Please call either:   Your PCP office  [phone], option [phone]    They will screen you over the phone and direct you to the nearest appropriate testing location    DO NOT go to your PCP or OB office without calling first

## 2020-08-24 NOTE — PROGRESS NOTES
Assessment/Plan:     Diagnoses and all orders for this visit:    Trichomoniasis  -     metroNIDAZOLE (FLAGYL) 500 mg tablet; Take 4 tablets in a single dose    Yeast infection  -     fluconazole (DIFLUCAN) 150 mg tablet; Take 1 tablet (150 mg total) by mouth once for 1 dose    Other orders  -     levonorgestrel (MIRENA) 20 MCG/24HR IUD; 1 each by Intrauterine route once      Explaine wet mount was positive yeast but was not definitive for Trich, explained both her and her partner should be treated for Trich  Safe and effective use of Metronidazole and Fluconazole was provided  Discussed comfort measures    Plan  Take Metronidazole as directed  Take Fluconazole as directed  Wear loose clothes and cotton underwear  Remember safe and condom use  Annual GYN exam is due in 1 year      Subjective:      Patient ID: Rolan Case is a 34 y o  female  HPI  Presents for wet mount for a Hx of Trich noted on PAP  Pt noted vaginal discharge and vulvar itching  1 partner x 3 years  Mirena for contraception    The following portions of the patient's history were reviewed and updated as appropriate: allergies, current medications, past family history, past medical history, past social history, past surgical history and problem list     Review of Systems      Pertinent items are note in the HPI    Objective:      /86   Pulse 68   Temp 97 8 °F (36 6 °C)   Wt 83 8 kg (184 lb 12 8 oz)   LMP 08/20/2020 (Exact Date)   BMI 28 10 kg/m²          Physical Exam  Constitutional:       Appearance: Normal appearance  Eyes:      General:         Right eye: No discharge  Left eye: No discharge  Neck:      Musculoskeletal: Normal range of motion  Pulmonary:      Effort: Pulmonary effort is normal  No respiratory distress  Genitourinary:     General: Normal vulva  Vagina: Vaginal discharge present  Musculoskeletal: Normal range of motion  Skin:     General: Skin is warm and dry     Neurological:      Mental Status: She is oriented to person, place, and time  Psychiatric:         Mood and Affect: Mood normal          Behavior: Behavior normal          Thought Content:  Thought content normal          Alert and oriented  Denies pain  WNL respiratory effort  Denies pain  WNL respiratory effort, negative cough or SOB  Negative fever  Vulva negative lesions, slight erythema  Vagina erythema, positive thick white discharge   Cervix positive thick white discharge, negative lesions  Wet mount positive for yeast, Trich was not identified, slides reviewed with Dr Glenis Arzola

## 2020-10-21 ENCOUNTER — OFFICE VISIT (OUTPATIENT)
Dept: FAMILY MEDICINE CLINIC | Facility: CLINIC | Age: 30
End: 2020-10-21
Payer: COMMERCIAL

## 2020-10-21 VITALS
RESPIRATION RATE: 18 BRPM | OXYGEN SATURATION: 98 % | HEART RATE: 94 BPM | TEMPERATURE: 97 F | HEIGHT: 68 IN | WEIGHT: 182 LBS | DIASTOLIC BLOOD PRESSURE: 82 MMHG | SYSTOLIC BLOOD PRESSURE: 112 MMHG | BODY MASS INDEX: 27.58 KG/M2

## 2020-10-21 DIAGNOSIS — Z13.220 SCREENING FOR CHOLESTEROL LEVEL: ICD-10-CM

## 2020-10-21 DIAGNOSIS — Z87.440 HISTORY OF UTI: ICD-10-CM

## 2020-10-21 DIAGNOSIS — Z00.01 ENCOUNTER FOR WELL ADULT EXAM WITH ABNORMAL FINDINGS: Primary | ICD-10-CM

## 2020-10-21 DIAGNOSIS — R53.83 OTHER FATIGUE: ICD-10-CM

## 2020-10-21 DIAGNOSIS — R73.9 HYPERGLYCEMIA: ICD-10-CM

## 2020-10-21 PROCEDURE — 1036F TOBACCO NON-USER: CPT | Performed by: NURSE PRACTITIONER

## 2020-10-21 PROCEDURE — 3725F SCREEN DEPRESSION PERFORMED: CPT | Performed by: NURSE PRACTITIONER

## 2020-10-21 PROCEDURE — 99385 PREV VISIT NEW AGE 18-39: CPT | Performed by: NURSE PRACTITIONER

## 2020-10-27 LAB
ALBUMIN SERPL-MCNC: 4.1 G/DL (ref 3.6–5.1)
ALBUMIN/GLOB SERPL: 1.3 (CALC) (ref 1–2.5)
ALP SERPL-CCNC: 75 U/L (ref 31–125)
ALT SERPL-CCNC: 6 U/L (ref 6–29)
APPEARANCE UR: CLEAR
AST SERPL-CCNC: 10 U/L (ref 10–30)
BACTERIA UR QL AUTO: NORMAL /HPF
BASOPHILS # BLD AUTO: 22 CELLS/UL (ref 0–200)
BASOPHILS NFR BLD AUTO: 0.5 %
BILIRUB SERPL-MCNC: 0.5 MG/DL (ref 0.2–1.2)
BILIRUB UR QL STRIP: NEGATIVE
BUN SERPL-MCNC: 8 MG/DL (ref 7–25)
BUN/CREAT SERPL: NORMAL (CALC) (ref 6–22)
CALCIUM SERPL-MCNC: 9.3 MG/DL (ref 8.6–10.2)
CHLORIDE SERPL-SCNC: 106 MMOL/L (ref 98–110)
CHOLEST SERPL-MCNC: 131 MG/DL
CHOLEST/HDLC SERPL: 3.1 (CALC)
CO2 SERPL-SCNC: 29 MMOL/L (ref 20–32)
COLOR UR: YELLOW
CREAT SERPL-MCNC: 0.77 MG/DL (ref 0.5–1.1)
EOSINOPHIL # BLD AUTO: 79 CELLS/UL (ref 15–500)
EOSINOPHIL NFR BLD AUTO: 1.8 %
ERYTHROCYTE [DISTWIDTH] IN BLOOD BY AUTOMATED COUNT: 12 % (ref 11–15)
EST. AVERAGE GLUCOSE BLD GHB EST-MCNC: 88 (CALC)
EST. AVERAGE GLUCOSE BLD GHB EST-SCNC: 4.9 (CALC)
GLOBULIN SER CALC-MCNC: 3.1 G/DL (CALC) (ref 1.9–3.7)
GLUCOSE SERPL-MCNC: 99 MG/DL (ref 65–99)
GLUCOSE UR QL STRIP: NEGATIVE
HBA1C MFR BLD: 4.7 % OF TOTAL HGB
HCT VFR BLD AUTO: 44.2 % (ref 35–45)
HDLC SERPL-MCNC: 42 MG/DL
HGB BLD-MCNC: 14.1 G/DL (ref 11.7–15.5)
HGB UR QL STRIP: NEGATIVE
HYALINE CASTS #/AREA URNS LPF: NORMAL /LPF
KETONES UR QL STRIP: NEGATIVE
LDLC SERPL CALC-MCNC: 76 MG/DL (CALC)
LEUKOCYTE ESTERASE UR QL STRIP: NEGATIVE
LYMPHOCYTES # BLD AUTO: 1694 CELLS/UL (ref 850–3900)
LYMPHOCYTES NFR BLD AUTO: 38.5 %
MCH RBC QN AUTO: 27.6 PG (ref 27–33)
MCHC RBC AUTO-ENTMCNC: 31.9 G/DL (ref 32–36)
MCV RBC AUTO: 86.5 FL (ref 80–100)
MONOCYTES # BLD AUTO: 290 CELLS/UL (ref 200–950)
MONOCYTES NFR BLD AUTO: 6.6 %
NEUTROPHILS # BLD AUTO: 2314 CELLS/UL (ref 1500–7800)
NEUTROPHILS NFR BLD AUTO: 52.6 %
NITRITE UR QL STRIP: NEGATIVE
NONHDLC SERPL-MCNC: 89 MG/DL (CALC)
PH UR STRIP: 5.5 [PH] (ref 5–8)
PLATELET # BLD AUTO: 239 THOUSAND/UL (ref 140–400)
PMV BLD REES-ECKER: 11.2 FL (ref 7.5–12.5)
POTASSIUM SERPL-SCNC: 4.1 MMOL/L (ref 3.5–5.3)
PROT SERPL-MCNC: 7.2 G/DL (ref 6.1–8.1)
PROT UR QL STRIP: NEGATIVE
RBC # BLD AUTO: 5.11 MILLION/UL (ref 3.8–5.1)
RBC #/AREA URNS HPF: NORMAL /HPF
SL AMB EGFR AFRICAN AMERICAN: 120 ML/MIN/1.73M2
SL AMB EGFR NON AFRICAN AMERICAN: 104 ML/MIN/1.73M2
SODIUM SERPL-SCNC: 138 MMOL/L (ref 135–146)
SP GR UR STRIP: 1.01 (ref 1–1.03)
SQUAMOUS #/AREA URNS HPF: NORMAL /HPF
TRIGL SERPL-MCNC: 46 MG/DL
TSH SERPL-ACNC: 0.81 MIU/L
WBC # BLD AUTO: 4.4 THOUSAND/UL (ref 3.8–10.8)
WBC #/AREA URNS HPF: NORMAL /HPF

## 2020-11-26 NOTE — ANESTHESIA POSTPROCEDURE EVALUATION
Post-Op Assessment Note    CV Status:  Stable    Pain management: adequate     Mental Status:  Alert and awake   Hydration Status:  Euvolemic   PONV Controlled:  Controlled   Airway Patency:  Patent   Post Op Vitals Reviewed: Yes      Staff: Anesthesiologist     Post-op block assessment: site cleaned, catheter intact and no complications        BP      Temp      Pulse    Resp      SpO2
with patient

## 2020-12-03 NOTE — LACTATION NOTE
This note was copied from a baby's chart  Met with mother to go discharge breastfeeding booklet including the over feeding log since birth for the first week  Emphasized 8 or more (12) feedings in a 24 hour period, what to expect for the number of diapers per day of life and the progression of properties of the  stooling pattern  Discussed s/s that breastfeeding is going well after day 4 and when to get help from a pediatrician or lactation support person after day 4  Booklet included Breast Pumping Instructions, When You Go Back to Work or School, and Breastfeeding Resources for after discharge including access to the number for the [employer]  Mother verbalized breastfeeding is going well  Enc to call for assistance as needed,phone # given  Viral illness

## 2021-03-03 ENCOUNTER — OFFICE VISIT (OUTPATIENT)
Dept: FAMILY MEDICINE CLINIC | Facility: CLINIC | Age: 31
End: 2021-03-03
Payer: COMMERCIAL

## 2021-03-03 VITALS
HEART RATE: 80 BPM | SYSTOLIC BLOOD PRESSURE: 116 MMHG | BODY MASS INDEX: 27.28 KG/M2 | WEIGHT: 180 LBS | HEIGHT: 68 IN | TEMPERATURE: 97.4 F | DIASTOLIC BLOOD PRESSURE: 80 MMHG | OXYGEN SATURATION: 99 %

## 2021-03-03 DIAGNOSIS — Z01.818 PREOP GENERAL PHYSICAL EXAM: Primary | ICD-10-CM

## 2021-03-03 PROCEDURE — 3725F SCREEN DEPRESSION PERFORMED: CPT | Performed by: NURSE PRACTITIONER

## 2021-03-03 PROCEDURE — 3008F BODY MASS INDEX DOCD: CPT | Performed by: NURSE PRACTITIONER

## 2021-03-03 PROCEDURE — 99214 OFFICE O/P EST MOD 30 MIN: CPT | Performed by: NURSE PRACTITIONER

## 2021-03-03 PROCEDURE — 1036F TOBACCO NON-USER: CPT | Performed by: NURSE PRACTITIONER

## 2021-03-03 NOTE — PROGRESS NOTES
Assessment/Plan:    Preop general physical exam  Presently clinically stable for scheduled eye surgery  Does not require BMP as she does not have a history of diabetes, kidney disease or taking diuretics  Avoidance of; Aspirin, Ibuprofen, Naproxen, and other NSAIDS 5-7 days prior to surgery  To call with any changes in present status  No EKG needed as patient does not have any hx of heart disease  To have COVID testing done within 7 days timeframe  Diagnoses and all orders for this visit:    Preop general physical exam  -     Novel Coronavirus (Covid-19),PCR [facility] - Collected at [facility] or [facility]; Future    Other orders  -     Cancel: Comprehensive metabolic panel; Future  -     Cancel: CBC and differential; Future          Subjective:      Patient ID: Tommy Prakash is a 27 y o  female  27year old female patient here for preop clearance for bilateral eye surgery for strabismus  The surgery is due on 3/12/21      The following portions of the patient's history were reviewed and updated as appropriate: allergies, current medications, past family history, past medical history, past social history, past surgical history and problem list     Review of Systems   Constitutional: Negative  Negative for appetite change, fatigue and fever  HENT: Negative  Negative for congestion  Eyes: Positive for visual disturbance  Respiratory: Negative  Negative for cough, chest tightness, shortness of breath and wheezing  Cardiovascular: Negative  Negative for chest pain and palpitations  Gastrointestinal: Negative  Endocrine: Negative  Genitourinary: Negative  Musculoskeletal: Negative  Skin: Negative  Allergic/Immunologic: Negative  Neurological: Negative  Negative for dizziness and headaches  Hematological: Negative  Psychiatric/Behavioral: Negative            Objective:      /80 (BP Location: Left arm, Patient Position: Sitting, Cuff Size: Adult)   Pulse 80   Temp [place] ) 97 4 °F (36 3 °C) (Oral)   Ht 5' 8" (1 727 m)   Wt 81 6 kg (180 lb)   LMP  (LMP Unknown)   SpO2 99%   BMI 27 37 kg/m²          Physical Exam  Vitals signs and nursing note reviewed  Constitutional:       General: She is not in acute distress  Appearance: Normal appearance  She is obese  She is not ill-appearing  HENT:      Head: Normocephalic and atraumatic  Right Ear: Tympanic membrane, ear canal and external ear normal  There is no impacted cerumen  Left Ear: Tympanic membrane, ear canal and external ear normal  There is no impacted cerumen  Nose: Nose normal  No congestion or rhinorrhea  Mouth/Throat:      Mouth: Mucous membranes are moist       Pharynx: Oropharynx is clear  No oropharyngeal exudate or posterior oropharyngeal erythema  Eyes:      Extraocular Movements: Extraocular movements intact  Conjunctiva/sclera: Conjunctivae normal       Pupils: Pupils are equal, round, and reactive to light  Comments: Strabismus bilateral   Neck:      Musculoskeletal: Normal range of motion and neck supple  No neck rigidity or muscular tenderness  Cardiovascular:      Rate and Rhythm: Normal rate and regular rhythm  Pulses: Normal pulses  Heart sounds: Normal heart sounds  No murmur  No friction rub  No gallop  Pulmonary:      Effort: Pulmonary effort is normal  No respiratory distress  Breath sounds: Normal breath sounds  Abdominal:      General: Bowel sounds are normal  There is no distension  Palpations: Abdomen is soft  Tenderness: There is no abdominal tenderness  Musculoskeletal: Normal range of motion  General: No tenderness  Skin:     General: Skin is warm and dry  Capillary Refill: Capillary refill takes less than 2 seconds  Neurological:      General: No focal deficit present  Mental Status: She is alert and oriented to person, place, and time     Psychiatric:         Mood and Affect: Mood normal  Behavior: Behavior normal          Thought Content:  Thought content normal          Judgment: Judgment normal

## 2021-03-03 NOTE — ASSESSMENT & PLAN NOTE
Presently clinically stable for scheduled eye surgery  Does not require BMP as she does not have a history of diabetes, kidney disease or taking diuretics  Avoidance of; Aspirin, Ibuprofen, Naproxen, and other NSAIDS 5-7 days prior to surgery  To call with any changes in present status  No EKG needed as patient does not have any hx of heart disease  To have COVID testing done within 7 days timeframe

## 2021-03-06 DIAGNOSIS — Z01.818 PREOP GENERAL PHYSICAL EXAM: ICD-10-CM

## 2021-03-06 PROCEDURE — U0005 INFEC AGEN DETEC AMPLI PROBE: HCPCS | Performed by: NURSE PRACTITIONER

## 2021-03-06 PROCEDURE — U0003 INFECTIOUS AGENT DETECTION BY NUCLEIC ACID (DNA OR RNA); SEVERE ACUTE RESPIRATORY SYNDROME CORONAVIRUS 2 (SARS-COV-2) (CORONAVIRUS DISEASE [COVID-19]), AMPLIFIED PROBE TECHNIQUE, MAKING USE OF HIGH THROUGHPUT TECHNOLOGIES AS DESCRIBED BY CMS-2020-01-R: HCPCS | Performed by: NURSE PRACTITIONER

## 2021-03-07 LAB — SARS-COV-2 RNA RESP QL NAA+PROBE: NEGATIVE

## 2021-03-24 ENCOUNTER — TELEPHONE (OUTPATIENT)
Dept: OTHER | Facility: OTHER | Age: 31
End: 2021-03-24

## 2021-03-24 NOTE — TELEPHONE ENCOUNTER
Patient called to set up an Appointment for a Covid test for Travel  She will be Traveling on March 31, 2021, Please f/u with Patient   Thank You

## 2021-03-29 ENCOUNTER — OFFICE VISIT (OUTPATIENT)
Dept: FAMILY MEDICINE CLINIC | Facility: CLINIC | Age: 31
End: 2021-03-29
Payer: COMMERCIAL

## 2021-03-29 DIAGNOSIS — Z71.84 TRAVEL ADVICE ENCOUNTER: Primary | ICD-10-CM

## 2021-03-29 PROCEDURE — U0003 INFECTIOUS AGENT DETECTION BY NUCLEIC ACID (DNA OR RNA); SEVERE ACUTE RESPIRATORY SYNDROME CORONAVIRUS 2 (SARS-COV-2) (CORONAVIRUS DISEASE [COVID-19]), AMPLIFIED PROBE TECHNIQUE, MAKING USE OF HIGH THROUGHPUT TECHNOLOGIES AS DESCRIBED BY CMS-2020-01-R: HCPCS | Performed by: PHYSICIAN ASSISTANT

## 2021-03-29 PROCEDURE — 99212 OFFICE O/P EST SF 10 MIN: CPT | Performed by: NURSE PRACTITIONER

## 2021-03-29 PROCEDURE — U0005 INFEC AGEN DETEC AMPLI PROBE: HCPCS | Performed by: PHYSICIAN ASSISTANT

## 2021-03-30 LAB — SARS-COV-2 RNA RESP QL NAA+PROBE: NEGATIVE

## 2021-04-26 ENCOUNTER — TELEMEDICINE (OUTPATIENT)
Dept: FAMILY MEDICINE CLINIC | Facility: CLINIC | Age: 31
End: 2021-04-26
Payer: COMMERCIAL

## 2021-04-26 DIAGNOSIS — B34.9 VIRAL INFECTION, UNSPECIFIED: ICD-10-CM

## 2021-04-26 DIAGNOSIS — B34.9 VIRAL INFECTION, UNSPECIFIED: Primary | ICD-10-CM

## 2021-04-26 PROBLEM — Z01.419 ENCOUNTER FOR ANNUAL ROUTINE GYNECOLOGICAL EXAMINATION: Status: RESOLVED | Noted: 2020-07-10 | Resolved: 2021-04-26

## 2021-04-26 PROBLEM — Z30.431 IUD CHECK UP: Status: RESOLVED | Noted: 2019-09-11 | Resolved: 2021-04-26

## 2021-04-26 PROBLEM — Z00.01 ENCOUNTER FOR WELL ADULT EXAM WITH ABNORMAL FINDINGS: Status: RESOLVED | Noted: 2020-10-21 | Resolved: 2021-04-26

## 2021-04-26 PROBLEM — IMO0001 CONTRACEPTION: Status: RESOLVED | Noted: 2019-06-27 | Resolved: 2021-04-26

## 2021-04-26 PROBLEM — Z87.440 HISTORY OF UTI: Status: RESOLVED | Noted: 2020-10-21 | Resolved: 2021-04-26

## 2021-04-26 PROBLEM — Z13.220 SCREENING FOR CHOLESTEROL LEVEL: Status: RESOLVED | Noted: 2020-10-21 | Resolved: 2021-04-26

## 2021-04-26 PROBLEM — Z01.818 PREOP GENERAL PHYSICAL EXAM: Status: RESOLVED | Noted: 2021-03-03 | Resolved: 2021-04-26

## 2021-04-26 PROCEDURE — 1036F TOBACCO NON-USER: CPT | Performed by: PHYSICIAN ASSISTANT

## 2021-04-26 PROCEDURE — U0003 INFECTIOUS AGENT DETECTION BY NUCLEIC ACID (DNA OR RNA); SEVERE ACUTE RESPIRATORY SYNDROME CORONAVIRUS 2 (SARS-COV-2) (CORONAVIRUS DISEASE [COVID-19]), AMPLIFIED PROBE TECHNIQUE, MAKING USE OF HIGH THROUGHPUT TECHNOLOGIES AS DESCRIBED BY CMS-2020-01-R: HCPCS | Performed by: PHYSICIAN ASSISTANT

## 2021-04-26 PROCEDURE — U0005 INFEC AGEN DETEC AMPLI PROBE: HCPCS | Performed by: PHYSICIAN ASSISTANT

## 2021-04-26 PROCEDURE — 99213 OFFICE O/P EST LOW 20 MIN: CPT | Performed by: PHYSICIAN ASSISTANT

## 2021-04-26 NOTE — PROGRESS NOTES
COVID-19 Outpatient Progress Note    Assessment/Plan:    Problem List Items Addressed This Visit        Other    Viral infection, unspecified - Primary     Symptoms began on Saturday  [place]  She was COVID tested a couple of times for travel, which were negative, however now she is having symptoms  No known exposure to COVID  Relevant Orders    Novel Coronavirus (Covid-19),PCR [facility] - Collected at [facility] or [facility]         Disposition:     I referred patient to one of our centralized sites for a COVID-19 swab  I reviewed quarantine recommendations with Kamila Falling  She will quarantine while waiting for test result  I have spent 20 minutes directly with the patient  Greater than 50% of this time was spent in counseling/coordination of care regarding: instructions for management and patient and family education  Encounter provider Balaji Keller PA-C    Provider located at [facility] AT [address]  [address] [address] [phone]    Recent Visits  No visits were found meeting these conditions  Showing recent visits within past 7 days and meeting all other requirements     Today's Visits  Date Type Provider Dept   04/26/21 Telemedicine Balaji Keller PA-C [facility] today's visits and meeting all other requirements     Future Appointments  No visits were found meeting these conditions  Showing future appointments within next 150 days and meeting all other requirements      My office door was closed  No one else was in the room  Paloma Hogan acknowledged consent and understanding of privacy and security of the telemedicine visit  I informed the patient that I have reviewed her record in Epic and presented the opportunity for her to ask any questions regarding the visit today  The patient agreed to participate      Subjective: Kmi Reich is a 27 y o  female who is concerned about COVID-19  Patient's symptoms include fever, chills, fatigue, malaise, loss of taste, myalgias and headache  Patient denies congestion, rhinorrhea, sore throat, anosmia, cough, shortness of breath, chest tightness, abdominal pain, nausea, vomiting and diarrhea  Date of symptom onset: 4/26/2021    Exposure:   Contact with a person who is under investigation (PUI) for or who is positive for COVID-19 within the last 14 days?: No    Hospitalized recently for fever and/or lower respiratory symptoms?: No      Currently a healthcare worker that is involved in direct patient care?: No      Works in a special setting where the risk of COVID-19 transmission may be high? (this may include long-term care, correctional and jail facilities; homeless shelters; assisted-living facilities and group homes ): No      Resident in a special setting where the risk of COVID-19 transmission may be high? (this may include long-term care, correctional and jail facilities; homeless shelters; assisted-living facilities and group homes ): No      Lab Results   Component Value Date    SARSCOV2 Negative 03/29/2021     Past Medical History:   Diagnosis Date    Varicella      Past Surgical History:   Procedure Laterality Date    WISDOM TOOTH EXTRACTION       Current Outpatient Medications   Medication Sig Dispense Refill    levonorgestrel (MIRENA) 20 MCG/24HR IUD 1 each by Intrauterine route once       No current facility-administered medications for this visit  No Known Allergies    Review of Systems   Constitutional: Positive for chills, fatigue and fever  HENT: Negative for congestion, rhinorrhea and sore throat  Respiratory: Negative for cough, chest tightness and shortness of breath  Gastrointestinal: Negative for abdominal pain, diarrhea, nausea and vomiting  Musculoskeletal: Positive for myalgias  Neurological: Positive for headaches  Objective: There were no vitals filed for this visit  Physical Exam  Constitutional:       General: She is not in acute distress  Appearance: Normal appearance  She is not toxic-appearing  HENT:      Head: Normocephalic  Eyes:      Extraocular Movements: Extraocular movements intact  Conjunctiva/sclera: Conjunctivae normal    Neck:      Musculoskeletal: Normal range of motion  Pulmonary:      Effort: Pulmonary effort is normal  No respiratory distress  Neurological:      Mental Status: She is alert and oriented to person, place, and time  Psychiatric:         Mood and Affect: Mood normal          Behavior: Behavior normal          Thought Content: Thought content normal        VIRTUAL VISIT DISCLAIMER    Kim Reich acknowledges that she has consented to an online visit or consultation  She understands that the online visit is based solely on information provided by her, and that, in the absence of a face-to-face physical evaluation by the physician, the diagnosis she receives is both limited and provisional in terms of accuracy and completeness  This is not intended to replace a full medical face-to-face evaluation by the physician  Roberto Phelps understands and accepts these terms

## 2021-04-26 NOTE — ASSESSMENT & PLAN NOTE
Symptoms began on Saturday  She has just returned from a trip to [facility] ([place])  She was COVID tested a couple of times for travel, which were negative, however now she is having symptoms  No known exposure to COVID

## 2021-04-26 NOTE — LETTER
April 26, 2021     Patient: Blas Hernandez   YOB: 1990   Date of Visit: 4/26/2021       To Whom it May Concern:    Dorinda Mcadams is under my professional care  She was seen on 4/26/2021  She is currently under investigation for COVID-19 infection, so she is unable to return to work at this time  I will send a follow up letter when we get her test result  If you have any questions or concerns, please don't hesitate to call           Sincerely,          Logan Rea PA-C

## 2021-04-27 ENCOUNTER — TELEPHONE (OUTPATIENT)
Dept: FAMILY MEDICINE CLINIC | Facility: CLINIC | Age: 31
End: 2021-04-27

## 2021-04-27 LAB — SARS-COV-2 RNA RESP QL NAA+PROBE: NEGATIVE

## 2021-04-27 NOTE — TELEPHONE ENCOUNTER
I called Javan Mckeon to let her know that her COVID test came back negative  She reports that she is still tired, but overall is feeling better today than she did yesterday  Letter for return to work tomorrow (4/28) in [address]

## 2021-04-28 ENCOUNTER — HOSPITAL ENCOUNTER (INPATIENT)
Facility: HOSPITAL | Age: 31
LOS: 2 days | Discharge: HOME | DRG: 872 | End: 2021-04-30
Attending: EMERGENCY MEDICINE | Admitting: INTERNAL MEDICINE
Payer: COMMERCIAL

## 2021-04-28 DIAGNOSIS — B54 MALARIA: ICD-10-CM

## 2021-04-28 DIAGNOSIS — R50.9 FEVER OF UNKNOWN ORIGIN: Primary | ICD-10-CM

## 2021-04-28 PROBLEM — E87.1 HYPONATREMIA: Status: ACTIVE | Noted: 2021-04-28

## 2021-04-28 PROBLEM — D69.6 THROMBOCYTOPENIA (HCC): Status: ACTIVE | Noted: 2021-04-28

## 2021-04-28 LAB
ALBUMIN SERPL BCP-MCNC: 3 G/DL (ref 3.5–5)
ALP SERPL-CCNC: 71 U/L (ref 46–116)
ALT SERPL W P-5'-P-CCNC: 13 U/L (ref 12–78)
ANION GAP SERPL CALCULATED.3IONS-SCNC: 7 MMOL/L (ref 4–13)
AST SERPL W P-5'-P-CCNC: 9 U/L (ref 5–45)
BASOPHILS # BLD AUTO: 0.01 THOUSANDS/ΜL (ref 0–0.1)
BASOPHILS NFR BLD AUTO: 0 % (ref 0–1)
BILIRUB SERPL-MCNC: 1.47 MG/DL (ref 0.2–1)
BUN SERPL-MCNC: 10 MG/DL (ref 5–25)
CALCIUM ALBUM COR SERPL-MCNC: 9.1 MG/DL (ref 8.3–10.1)
CALCIUM SERPL-MCNC: 8.3 MG/DL (ref 8.3–10.1)
CHLORIDE SERPL-SCNC: 102 MMOL/L (ref 100–108)
CO2 SERPL-SCNC: 25 MMOL/L (ref 21–32)
CREAT SERPL-MCNC: 1.05 MG/DL (ref 0.6–1.3)
EOSINOPHIL # BLD AUTO: 0 THOUSAND/ΜL (ref 0–0.61)
EOSINOPHIL NFR BLD AUTO: 0 % (ref 0–6)
ERYTHROCYTE [DISTWIDTH] IN BLOOD BY AUTOMATED COUNT: 11.8 % (ref 11.6–15.1)
EXT PREG TEST URINE: NEGATIVE
EXT. CONTROL ED NAV: NORMAL
GFR SERPL CREATININE-BSD FRML MDRD: 82 ML/MIN/1.73SQ M
GLUCOSE SERPL-MCNC: 110 MG/DL (ref 65–140)
HCT VFR BLD AUTO: 40.3 % (ref 34.8–46.1)
HGB BLD-MCNC: 13.2 G/DL (ref 11.5–15.4)
IMM GRANULOCYTES # BLD AUTO: 0.03 THOUSAND/UL (ref 0–0.2)
IMM GRANULOCYTES NFR BLD AUTO: 1 % (ref 0–2)
LYMPHOCYTES # BLD AUTO: 0.72 THOUSANDS/ΜL (ref 0.6–4.47)
LYMPHOCYTES NFR BLD AUTO: 13 % (ref 14–44)
MCH RBC QN AUTO: 28.1 PG (ref 26.8–34.3)
MCHC RBC AUTO-ENTMCNC: 32.8 G/DL (ref 31.4–37.4)
MCV RBC AUTO: 86 FL (ref 82–98)
MONOCYTES # BLD AUTO: 0.6 THOUSAND/ΜL (ref 0.17–1.22)
MONOCYTES NFR BLD AUTO: 11 % (ref 4–12)
NEUTROPHILS # BLD AUTO: 4.29 THOUSANDS/ΜL (ref 1.85–7.62)
NEUTS SEG NFR BLD AUTO: 75 % (ref 43–75)
NRBC BLD AUTO-RTO: 0 /100 WBCS
PLATELET # BLD AUTO: 68 THOUSANDS/UL (ref 149–390)
PMV BLD AUTO: 11.1 FL (ref 8.9–12.7)
POTASSIUM SERPL-SCNC: 4.3 MMOL/L (ref 3.5–5.3)
PROT SERPL-MCNC: 7.7 G/DL (ref 6.4–8.2)
RBC # BLD AUTO: 4.7 MILLION/UL (ref 3.81–5.12)
SODIUM SERPL-SCNC: 134 MMOL/L (ref 136–145)
WBC # BLD AUTO: 5.65 THOUSAND/UL (ref 4.31–10.16)

## 2021-04-28 PROCEDURE — 87798 DETECT AGENT NOS DNA AMP: CPT | Performed by: PATHOLOGY

## 2021-04-28 PROCEDURE — 99284 EMERGENCY DEPT VISIT MOD MDM: CPT | Performed by: PHYSICIAN ASSISTANT

## 2021-04-28 PROCEDURE — 87207 SMEAR SPECIAL STAIN: CPT | Performed by: PHYSICIAN ASSISTANT

## 2021-04-28 PROCEDURE — 81025 URINE PREGNANCY TEST: CPT | Performed by: PHYSICIAN ASSISTANT

## 2021-04-28 PROCEDURE — 36415 COLL VENOUS BLD VENIPUNCTURE: CPT | Performed by: PHYSICIAN ASSISTANT

## 2021-04-28 PROCEDURE — 80053 COMPREHEN METABOLIC PANEL: CPT | Performed by: PHYSICIAN ASSISTANT

## 2021-04-28 PROCEDURE — 85025 COMPLETE CBC W/AUTO DIFF WBC: CPT | Performed by: PHYSICIAN ASSISTANT

## 2021-04-28 PROCEDURE — 99284 EMERGENCY DEPT VISIT MOD MDM: CPT

## 2021-04-28 PROCEDURE — 99223 1ST HOSP IP/OBS HIGH 75: CPT | Performed by: INTERNAL MEDICINE

## 2021-04-28 PROCEDURE — 87040 BLOOD CULTURE FOR BACTERIA: CPT | Performed by: PHYSICIAN ASSISTANT

## 2021-04-28 RX ORDER — ATOVAQUONE AND PROGUANIL HYDROCHLORIDE 250; 100 MG/1; MG/1
4 TABLET, FILM COATED ORAL
Status: DISCONTINUED | OUTPATIENT
Start: 2021-04-29 | End: 2021-04-30 | Stop reason: HOSPADM

## 2021-04-28 RX ORDER — CALCIUM CARBONATE 200(500)MG
1000 TABLET,CHEWABLE ORAL DAILY PRN
Status: DISCONTINUED | OUTPATIENT
Start: 2021-04-28 | End: 2021-04-30 | Stop reason: HOSPADM

## 2021-04-28 RX ORDER — ONDANSETRON 2 MG/ML
4 INJECTION INTRAMUSCULAR; INTRAVENOUS EVERY 6 HOURS PRN
Status: DISCONTINUED | OUTPATIENT
Start: 2021-04-28 | End: 2021-04-30 | Stop reason: HOSPADM

## 2021-04-28 RX ORDER — ACETAMINOPHEN 325 MG/1
650 TABLET ORAL EVERY 6 HOURS SCHEDULED
Status: DISCONTINUED | OUTPATIENT
Start: 2021-04-28 | End: 2021-04-30 | Stop reason: HOSPADM

## 2021-04-28 RX ORDER — SODIUM CHLORIDE 9 MG/ML
100 INJECTION, SOLUTION INTRAVENOUS CONTINUOUS
Status: DISPENSED | OUTPATIENT
Start: 2021-04-28 | End: 2021-04-29

## 2021-04-28 RX ORDER — ACETAMINOPHEN 325 MG/1
650 TABLET ORAL ONCE
Status: COMPLETED | OUTPATIENT
Start: 2021-04-28 | End: 2021-04-28

## 2021-04-28 RX ORDER — POLYETHYLENE GLYCOL 3350 17 G/17G
17 POWDER, FOR SOLUTION ORAL DAILY PRN
Status: DISCONTINUED | OUTPATIENT
Start: 2021-04-28 | End: 2021-04-30 | Stop reason: HOSPADM

## 2021-04-28 RX ADMIN — ACETAMINOPHEN 650 MG: 325 TABLET ORAL at 22:06

## 2021-04-28 RX ADMIN — SODIUM CHLORIDE 100 ML/HR: 0.9 INJECTION, SOLUTION INTRAVENOUS at 22:06

## 2021-04-28 RX ADMIN — ACETAMINOPHEN 650 MG: 325 TABLET ORAL at 13:50

## 2021-04-28 RX ADMIN — CEFTRIAXONE SODIUM 1000 MG: 10 INJECTION, POWDER, FOR SOLUTION INTRAVENOUS at 22:06

## 2021-04-28 NOTE — ED NOTES
Family member at bedside  Pt sitting up in bed eating [employer]  Pt resting comfortably, No distress noted       Vignesh Roland  04/28/21 [id number]

## 2021-04-28 NOTE — ED PROVIDER NOTES
History  Chief Complaint   Patient presents with    Generalized Body Aches     pt reports body aches and fevers worse at night since saturday  states last temp at home was 100 4 and took tylenol at 0300  pt denies sick contacts  recent covid test was negative  44-year-old female recently diagnosed with a viral infection and COVID test was negative presented to the ED with persistent body aches, fevers, chills, headache, malaise, weakness, and new onset of diarrhea today  Patient notes she never had these symptoms in the past   She recently traveled from [facility] ([place])  She notes no COVID contacts and was tested multiple times for COVID while in [facility] ([place]) and on return home from [facility] ([place]) all were negative  Prior to Admission Medications   Prescriptions Last Dose Informant Patient Reported? Taking?   levonorgestrel (MIRENA) 20 MCG/24HR IUD Unknown at Unknown time  Yes No   Si each by Intrauterine route once      Facility-Administered Medications: None       Past Medical History:   Diagnosis Date    Varicella        Past Surgical History:   Procedure Laterality Date    WISDOM TOOTH EXTRACTION         Family History   Problem Relation Age of Onset    Hypertension Mother     Diabetes Father     Hypertension Sister      I have reviewed and agree with the history as documented  E-Cigarette/Vaping    E-Cigarette Use Never User      E-Cigarette/Vaping Substances     Social History     Tobacco Use    Smoking status: Never Smoker    Smokeless tobacco: Never Used   Substance Use Topics    Alcohol use: Yes     Frequency: Monthly or less     Comment: social    Drug use: No       Review of Systems   Constitutional: Positive for appetite change (Decreased), chills, fatigue and fever (T-max measured at 102)  Negative for activity change  HENT: Negative for congestion, ear pain, rhinorrhea, sinus pressure, sinus pain and sore throat  Eyes: Negative for pain, discharge and redness     Respiratory: Negative for cough, shortness of breath and wheezing  Cardiovascular: Negative for chest pain and palpitations  Gastrointestinal: Positive for diarrhea (Times 24 hours)  Negative for abdominal pain, nausea and vomiting  Genitourinary: Negative for difficulty urinating, dysuria, flank pain, frequency, hematuria and urgency  Musculoskeletal: Positive for myalgias (General body aches)  Negative for back pain and neck pain  Skin: Negative for color change and rash  Neurological: Positive for weakness and headaches (X1 week)  Negative for dizziness and light-headedness  Psychiatric/Behavioral: Negative for agitation, behavioral problems, confusion and hallucinations  Physical Exam  Physical Exam  Vitals signs and nursing note reviewed  Constitutional:       General: She is not in acute distress  Appearance: She is well-developed  She is ill-appearing  She is not toxic-appearing or diaphoretic  HENT:      Head: Normocephalic and atraumatic  Nose: Nose normal       Mouth/Throat:      Mouth: Mucous membranes are moist       Pharynx: Oropharynx is clear  Eyes:      Extraocular Movements: Extraocular movements intact  Pupils: Pupils are equal, round, and reactive to light  Neck:      Musculoskeletal: Normal range of motion and neck supple  Cardiovascular:      Rate and Rhythm: Normal rate and regular rhythm  Pulses: Normal pulses  Heart sounds: Normal heart sounds  Pulmonary:      Effort: Pulmonary effort is normal       Breath sounds: Normal breath sounds  No wheezing  Abdominal:      General: Bowel sounds are normal       Palpations: Abdomen is soft  Tenderness: There is no abdominal tenderness  There is no guarding or rebound  Musculoskeletal: Normal range of motion  General: No tenderness or deformity  Skin:     General: Skin is warm and dry  Capillary Refill: Capillary refill takes less than 2 seconds     Neurological:      Mental Status: She is alert and oriented to person, place, and time     Psychiatric:         Behavior: Behavior normal          Vital Signs  ED Triage Vitals   Temperature Pulse Respirations Blood Pressure SpO2   04/28/21 1346 04/28/21 1346 04/28/21 1346 04/28/21 1346 04/28/21 1346   (!) 103 °F (39 4 °C) (!) 139 16 97/65 96 %      Temp Source Heart Rate Source Patient Position - Orthostatic VS BP Location FiO2 (%)   04/28/21 1346 04/28/21 1639 04/28/21 1810 04/28/21 1639 --   Oral Monitor Sitting Right arm       Pain Score       04/28/21 1346       7           Vitals:    04/28/21 1346 04/28/21 1452 04/28/21 1639 04/28/21 1810   BP: 97/65  107/64 107/71   Pulse: (!) 139 (!) 118 86 100   Patient Position - Orthostatic VS:    Sitting         Visual Acuity      ED Medications  Medications   ceftriaxone (ROCEPHIN) 1 g/50 mL in dextrose IVPB (has no administration in time range)   acetaminophen (TYLENOL) tablet 650 mg (650 mg Oral Given 4/28/21 1350)       Diagnostic Studies  Results Reviewed     Procedure Component Value Units Date/Time    Blood culture #1 [[id number]]     Lab Status: No result Specimen: Blood from Arm, Left     Blood culture #2 [[id number]]     Lab Status: No result Specimen: Blood from Arm, Right     POCT pregnancy, urine [[id number]]     Lab Status: No result     CBC and differential [[id number]]  (Abnormal) Collected: 04/28/21 1723    Lab Status: Final result Specimen: Blood from Arm, Right Updated: 04/28/21 1814     WBC 5 65 Thousand/uL      RBC 4 70 Million/uL      Hemoglobin 13 2 g/dL      Hematocrit 40 3 %      MCV 86 fL      MCH 28 1 pg      MCHC 32 8 g/dL      RDW 11 8 %      MPV 11 1 fL      Platelets 68 Thousands/uL      nRBC 0 /100 WBCs      Neutrophils Relative 75 %      Immat GRANS % 1 %      Lymphocytes Relative 13 %      Monocytes Relative 11 %      Eosinophils Relative 0 %      Basophils Relative 0 %      Neutrophils Absolute 4 29 Thousands/µL      Immature Grans Absolute 0 03 Thousand/uL      Lymphocytes Absolute 0 72 Thousands/µL      Monocytes Absolute 0 60 Thousand/µL      Eosinophils Absolute 0 00 Thousand/µL      Basophils Absolute 0 01 Thousands/µL     Comprehensive metabolic panel [[id number]]  (Abnormal) Collected: 04/28/21 1723    Lab Status: Final result Specimen: Blood from Arm, Right Updated: 04/28/21 1747     Sodium 134 mmol/L      Potassium 4 3 mmol/L      Chloride 102 mmol/L      CO2 25 mmol/L      ANION GAP 7 mmol/L      BUN 10 mg/dL      Creatinine 1 05 mg/dL      Glucose 110 mg/dL      Calcium 8 3 mg/dL      Corrected Calcium 9 1 mg/dL      AST 9 U/L      ALT 13 U/L      Alkaline Phosphatase 71 U/L      Total Protein 7 7 g/dL      Albumin 3 0 g/dL      Total Bilirubin 1 47 mg/dL      eGFR 82 ml/min/1 73sq m     Narrative:      [place] guidelines for Chronic Kidney Disease (CKD):     Stage 1 with normal or high GFR (GFR > 90 mL/min/1 73 square meters)    Stage 2 Mild CKD (GFR = 60-89 mL/min/1 73 square meters)    Stage 3A Moderate CKD (GFR = 45-59 mL/min/1 73 square meters)    Stage 3B Moderate CKD (GFR = 30-44 mL/min/1 73 square meters)    Stage 4 Severe CKD (GFR = 15-29 mL/min/1 73 square meters)    Stage 5 End Stage CKD (GFR <15 mL/min/1 73 square meters)  Note: GFR calculation is accurate only with a steady state creatinine    Blood Parasite smear [[id number]] Collected: 04/28/21 1723    Lab Status: In process Specimen: Blood from Arm, Right Updated: 04/28/21 1727                 No orders to display              Procedures  Procedures         ED Course  ED Course as of Apr 28 1903 Wed Apr 28, 2021   1800 Elevated T-Bili at 1 47   Comprehensive metabolic panel(!)   8973 Plts 68   CBC and differential(!)   1901 Rule out [place], Typhoid fever given recent travel to [place]    -Insert IV  -CBC: plt 68, CMP: Elevated T-Bili    -Discussed with infectious disease Dr Stacie Chairez, he believes this patient has enough factors to begin treatment for malaria, however cannot rule out typhoid fever   He would like patient to be admitted, started on ceftriaxone 1 g every 24 hours, blood cultures x2, repeat blood parasite smear tomorrow a m , start on Malarone 250/100 4 pills once a day  -Discussed with Dr Alan Vaughn from Internal Medicine, he is willing to admit the patient  SBIRT 22yo+      Most Recent Value   SBIRT (22 yo +)   In order to provide better care to our patients, we are screening all of our patients for alcohol and drug use  Would it be okay to ask you these screening questions? Unable to answer at this time Filed at: 04/28/2021 [address] [place] [address]  Number of Diagnoses or Management Options  Fever of unknown origin:   Diagnosis management comments: 24-year-old female recently diagnosed with a viral infection and COVID test was negative presented to the ED with persistent body aches, fevers, chills, headache, malaise, weakness, and new onset of diarrhea today  Patient notes she never had these symptoms in the past   She recently traveled from [facility] ([place])  She notes no COVID contacts and was tested multiple times for COVID while in [facility] ([place] and on return home from [facility] ([place] all were negative  Rule out [facility], Typhoid fever given recent travel to [facility] ([place])  -Insert IV  -CBC: plt 68, CMP: Elevated T-Bili    -Discussed with infectious disease Dr Edward Corral, he believes this patient has enough factors to begin treatment for malaria, however cannot rule out typhoid fever  He would like patient to be admitted, started on ceftriaxone 1 g every 24 hours, blood cultures x2, repeat blood parasite smear tomorrow a m , start on Malarone 250/100 4 pills once a day  -Discussed with Dr Alan Vaughn from Internal Medicine, he is willing to admit the patient         Amount and/or Complexity of Data Reviewed  Clinical lab tests: ordered        Disposition  Final diagnoses:   Fever of unknown origin     Time reflects when diagnosis was documented in both MDM as applicable and the Disposition within this note     Time User Action Codes Description Comment    4/28/2021  7:02 PM Trine Prader Add [R50 9] Fever of unknown origin       ED Disposition     ED Disposition Condition Date/Time Comment    Admit Stable Wed Apr 28, 2021  7:02 PM Case was discussed with EMILIANO and the patient's admission status was agreed to be Admission Status: inpatient status to the service of Dr Alpesh Pretty   Follow-up Information    None         Patient's Medications   Discharge Prescriptions    No medications on file     No discharge procedures on file      PDMP Review     None          ED Provider  Electronically Signed by           Abram Griffith PA-C  04/28/21 0347

## 2021-04-29 LAB
% PARASITEMIA: 2.2
ALBUMIN SERPL BCP-MCNC: 2.6 G/DL (ref 3.5–5)
ALP SERPL-CCNC: 73 U/L (ref 46–116)
ALT SERPL W P-5'-P-CCNC: 15 U/L (ref 12–78)
ANION GAP SERPL CALCULATED.3IONS-SCNC: 7 MMOL/L (ref 4–13)
AST SERPL W P-5'-P-CCNC: 12 U/L (ref 5–45)
BASOPHILS # BLD AUTO: 0.02 THOUSANDS/ΜL (ref 0–0.1)
BASOPHILS NFR BLD AUTO: 1 % (ref 0–1)
BILIRUB SERPL-MCNC: 0.79 MG/DL (ref 0.2–1)
BUN SERPL-MCNC: 8 MG/DL (ref 5–25)
CALCIUM ALBUM COR SERPL-MCNC: 8.9 MG/DL (ref 8.3–10.1)
CALCIUM SERPL-MCNC: 7.8 MG/DL (ref 8.3–10.1)
CHLORIDE SERPL-SCNC: 105 MMOL/L (ref 100–108)
CO2 SERPL-SCNC: 25 MMOL/L (ref 21–32)
CREAT SERPL-MCNC: 0.75 MG/DL (ref 0.6–1.3)
EOSINOPHIL # BLD AUTO: 0.04 THOUSAND/ΜL (ref 0–0.61)
EOSINOPHIL NFR BLD AUTO: 1 % (ref 0–6)
ERYTHROCYTE [DISTWIDTH] IN BLOOD BY AUTOMATED COUNT: 12.1 % (ref 11.6–15.1)
GFR SERPL CREATININE-BSD FRML MDRD: 124 ML/MIN/1.73SQ M
GLUCOSE SERPL-MCNC: 107 MG/DL (ref 65–140)
HCT VFR BLD AUTO: 36.2 % (ref 34.8–46.1)
HGB BLD-MCNC: 11.9 G/DL (ref 11.5–15.4)
IMM GRANULOCYTES # BLD AUTO: 0.03 THOUSAND/UL (ref 0–0.2)
IMM GRANULOCYTES NFR BLD AUTO: 1 % (ref 0–2)
LYMPHOCYTES # BLD AUTO: 1.53 THOUSANDS/ΜL (ref 0.6–4.47)
LYMPHOCYTES NFR BLD AUTO: 41 % (ref 14–44)
MCH RBC QN AUTO: 27.8 PG (ref 26.8–34.3)
MCHC RBC AUTO-ENTMCNC: 32.9 G/DL (ref 31.4–37.4)
MCV RBC AUTO: 85 FL (ref 82–98)
MONOCYTES # BLD AUTO: 0.53 THOUSAND/ΜL (ref 0.17–1.22)
MONOCYTES NFR BLD AUTO: 14 % (ref 4–12)
NEUTROPHILS # BLD AUTO: 1.57 THOUSANDS/ΜL (ref 1.85–7.62)
NEUTS SEG NFR BLD AUTO: 42 % (ref 43–75)
NRBC BLD AUTO-RTO: 0 /100 WBCS
PARASITE BLD: YES
PATHOLOGIST INTERPRETATION: NORMAL
PLATELET # BLD AUTO: 61 THOUSANDS/UL (ref 149–390)
PMV BLD AUTO: 12.4 FL (ref 8.9–12.7)
POTASSIUM SERPL-SCNC: 3.7 MMOL/L (ref 3.5–5.3)
PROT SERPL-MCNC: 7 G/DL (ref 6.4–8.2)
RBC # BLD AUTO: 4.28 MILLION/UL (ref 3.81–5.12)
SODIUM SERPL-SCNC: 137 MMOL/L (ref 136–145)
WBC # BLD AUTO: 3.72 THOUSAND/UL (ref 4.31–10.16)

## 2021-04-29 PROCEDURE — 99223 1ST HOSP IP/OBS HIGH 75: CPT | Performed by: INTERNAL MEDICINE

## 2021-04-29 PROCEDURE — 99232 SBSQ HOSP IP/OBS MODERATE 35: CPT | Performed by: PHYSICIAN ASSISTANT

## 2021-04-29 PROCEDURE — 85025 COMPLETE CBC W/AUTO DIFF WBC: CPT | Performed by: PHYSICIAN ASSISTANT

## 2021-04-29 PROCEDURE — 80053 COMPREHEN METABOLIC PANEL: CPT | Performed by: PHYSICIAN ASSISTANT

## 2021-04-29 RX ADMIN — ATOVAQUONE AND PROGUANIL HYDROCHLORIDE 4 TABLET: 250; 100 TABLET, FILM COATED ORAL at 08:27

## 2021-04-29 RX ADMIN — ACETAMINOPHEN 650 MG: 325 TABLET ORAL at 17:54

## 2021-04-29 RX ADMIN — ACETAMINOPHEN 650 MG: 325 TABLET ORAL at 11:22

## 2021-04-29 RX ADMIN — ACETAMINOPHEN 650 MG: 325 TABLET ORAL at 05:58

## 2021-04-29 NOTE — PLAN OF CARE
Problem: PAIN - ADULT  Goal: Verbalizes/displays adequate comfort level or baseline comfort level  Description: Interventions:  - Encourage patient to monitor pain and request assistance  - Assess pain using appropriate pain scale  - Administer analgesics based on type and severity of pain and evaluate response  - Implement non-pharmacological measures as appropriate and evaluate response  - Consider cultural and social influences on pain and pain management  - Notify physician/advanced practitioner if interventions unsuccessful or patient reports new pain  Outcome: Progressing     Problem: INFECTION - ADULT  Goal: Absence or prevention of progression during hospitalization  Description: INTERVENTIONS:  - Assess and monitor for signs and symptoms of infection  - Monitor lab/diagnostic results  - Monitor all insertion sites, i e  indwelling lines, tubes, and drains  - Monitor endotracheal if appropriate and nasal secretions for changes in amount and color  - [place] appropriate cooling/warming therapies per order  - Administer medications as ordered  - Instruct and encourage patient and family to use good hand hygiene technique  - Identify and instruct in appropriate isolation precautions for identified infection/condition  Outcome: Progressing     Problem: DISCHARGE PLANNING  Goal: Discharge to home or other facility with appropriate resources  Description: INTERVENTIONS:  - Identify barriers to discharge w/patient and caregiver  - Arrange for needed discharge resources and transportation as appropriate  - Identify discharge learning needs (meds, wound care, etc )  - Arrange for interpretive services to assist at discharge as needed  - Refer to Case Management Department for coordinating discharge planning if the patient needs post-hospital services based on physician/advanced practitioner order or complex needs related to functional status, cognitive ability, or social support system  Outcome: Progressing Problem: Knowledge Deficit  Goal: Patient/family/caregiver demonstrates understanding of disease process, treatment plan, medications, and discharge instructions  Description: Complete learning assessment and assess knowledge base    Interventions:  - Provide teaching at level of understanding  - Provide teaching via preferred learning methods  Outcome: Progressing     Problem: GASTROINTESTINAL - ADULT  Goal: Minimal or absence of nausea and/or vomiting  Description: INTERVENTIONS:  - Administer IV fluids if ordered to ensure adequate hydration  - Maintain NPO status until nausea and vomiting are resolved  - Nasogastric tube if ordered  - Administer ordered antiemetic medications as needed  - Provide nonpharmacologic comfort measures as appropriate  - Advance diet as tolerated, if ordered  - Consider nutrition services referral to assist patient with adequate nutrition and appropriate food choices  Outcome: Progressing     Problem: METABOLIC, FLUID AND ELECTROLYTES - ADULT  Goal: Electrolytes maintained within normal limits  Description: INTERVENTIONS:  - Monitor labs and assess patient for signs and symptoms of electrolyte imbalances  - Administer electrolyte replacement as ordered  - Monitor response to electrolyte replacements, including repeat lab results as appropriate  - Instruct patient on fluid and nutrition as appropriate  Outcome: Progressing

## 2021-04-29 NOTE — H&P
[address] [place] [address]  H&P- [address] 1990, 27 y o  female MRN: [id number]  Unit/Bed#: E2 -01 Encounter: [id number]  Primary Care Provider: BRITNEY Houston   Date and time admitted to hospital: 4/28/2021  4:37 PM    * Fever in adult  Assessment & Plan  · Presents with 4 days of fever, chills, malaise, weakness  · COVID negative  · Recently returned from [facility] ([place]) approximately 2 weeks ago  · Denies any recent sick contacts  · T-max 103° on arrival- was given Tylenol with improvement  · Additional complaining of 1 day of diarrhea  · Infectious disease consulted  · Recommending prophylactic treatment malaria however differential cannot exclude typhoid fever  · Ceftriaxone IV q d  · Blood cultures pending  · Blood parasite smear pending  · Malarone 250/100 4 pills qd  · IV fluids  · Tylenol 650 q 6  · Monitor white blood cell curve and fever curve    Hyponatremia  Assessment & Plan  · Very mild at 134  · IV fluids   · BMP in a m  Thrombocytopenia (HCC)  Assessment & Plan  · Platelet count 68   · Will continue to monitor CBC and transfuse as necessary  · Patient without any petechiae or other bleeding    VTE Prophylaxis: Low risk  / reason for no mechanical VTE prophylaxis Low risk   Code Status:  Full code  POLST: POLST form is not discussed and not completed at this time  Discussion with family:  Discussed plan of care with patient, answering questions  Anticipated Length of Stay:  Patient will be admitted on an Inpatient basis with an anticipated length of stay of  greater than 2 midnights  Justification for Hospital Stay:  Evaluation of fever unknown origin    Total Time for Visit, including Counseling / Coordination of Care: 60 minutes  Greater than 50% of this total time spent on direct patient counseling and coordination of care      Chief Complaint:   Fever, weakness    History of Present Illness:    [address] is a 27 y o  female with no significant past medical history who presents with approximately 4 days of fever, chills, malaise, body aches, weakness, headache  The patient states that she recently returned from [facility] ([place]) approximately 2 weeks ago to visit family  Upon which she was COVID negative on multiple occasions  Her most recent COVID test was negative on 04/26  Patient reports a T-max at home of 101  T-max in the emergency department 103  She states that the only thing that makes her feel better is Tylenol for approximately 4 hours  Reports that her symptoms promptly return once it wears off  She denies any sick contacts  No provocative factors  States that she has additionally had approximately 1 day of diarrhea  A combination of her symptoms prompted her to come to the emergency department for further evaluation  While in the emergency department, the patient's labs revealed no leukocytosis  They did reveal however a mild hyponatremia 134, and thrombocytopenia at 68  Infectious disease was consulted for further evaluation and their differential includes malaria versus typhoid fever  Review of Systems:    Review of Systems   Constitutional: Positive for chills, fatigue and fever  Negative for diaphoresis  HENT: Negative for ear pain and sore throat  Eyes: Negative for pain and visual disturbance  Respiratory: Negative for cough, shortness of breath and wheezing  Cardiovascular: Negative for chest pain, palpitations and leg swelling  Gastrointestinal: Positive for diarrhea and nausea  Negative for abdominal pain, constipation and vomiting  Endocrine: Negative for polydipsia and polyphagia  Genitourinary: Negative for dysuria, frequency, hematuria and urgency  Musculoskeletal: Positive for myalgias  Negative for arthralgias and back pain  Skin: Negative for color change and rash  Neurological: Positive for weakness and headaches  Negative for dizziness, seizures, syncope, light-headedness and numbness     All other systems reviewed and are negative  Past Medical and Surgical History:     Past Medical History:   Diagnosis Date    Varicella        Past Surgical History:   Procedure Laterality Date    WISDOM TOOTH EXTRACTION         Meds/Allergies:    Prior to Admission medications    Medication Sig Start Date End Date Taking? Authorizing Provider   levonorgestrel (MIRENA) 20 MCG/24HR IUD 1 each by Intrauterine route once    Historical Provider, MD     I have reviewed home medications with patient personally  Allergies: No Known Allergies    Social History:     Marital Status: Single  Patient Pre-hospital Living Situation:  With 3year-old daughter  Patient Pre-hospital Level of Mobility:  Ambulatory  Patient Pre-hospital Diet Restrictions:  None  Substance Use History:   Social History     Substance and Sexual Activity   Alcohol Use Yes    Frequency: Monthly or less    Comment: social     Social History     Tobacco Use   Smoking Status Never Smoker   Smokeless Tobacco Never Used     Social History     Substance and Sexual Activity   Drug Use No       Family History:    Family History   Problem Relation Age of Onset    Hypertension Mother     Diabetes Father     Hypertension Sister        Physical Exam:     Vitals:   Blood Pressure: 112/71 (04/28/21 1950)  Pulse: 100 (04/28/21 1950)  Temperature: 98 5 °F (36 9 °C) (04/28/21 1950)  Temp Source: Temporal (04/28/21 1950)  Respirations: 18 (04/28/21 1950)  Height: 5' 8" (172 7 cm) (04/28/21 1639)  Weight - Scale: 82 kg (180 lb 12 4 oz) (04/28/21 1346)  SpO2: 98 % (04/28/21 1950)    Physical Exam  Vitals signs and nursing note reviewed  Constitutional:       General: She is not in acute distress  Appearance: Normal appearance  She is well-developed and normal weight  She is not ill-appearing, toxic-appearing or diaphoretic  Comments: Very pleasant, sitting up in bed comfortably  HENT:      Head: Normocephalic and atraumatic     Eyes:      General: No scleral icterus  Conjunctiva/sclera: Conjunctivae normal       Pupils: Pupils are equal, round, and reactive to light  Neck:      Musculoskeletal: Neck supple  Cardiovascular:      Rate and Rhythm: Regular rhythm  Tachycardia present  Heart sounds: No murmur  No friction rub  No gallop  Pulmonary:      Effort: Pulmonary effort is normal  No respiratory distress  Breath sounds: Normal breath sounds  Abdominal:      General: Abdomen is flat  Bowel sounds are normal  There is no distension  Palpations: Abdomen is soft  Tenderness: There is no abdominal tenderness  There is no guarding or rebound  Musculoskeletal:      Right lower leg: No edema  Left lower leg: No edema  Lymphadenopathy:      Cervical: No cervical adenopathy  Skin:     General: Skin is warm and dry  Capillary Refill: Capillary refill takes 2 to 3 seconds  Coloration: Skin is not pale  Findings: No erythema or rash  Neurological:      General: No focal deficit present  Mental Status: She is alert and oriented to person, place, and time  Mental status is at baseline  Additional Data:     Lab Results: I have personally reviewed pertinent reports  and I have personally reviewed pertinent films in PACS    Results from last 7 days   Lab Units 04/28/21  1723   WBC Thousand/uL 5 65   HEMOGLOBIN g/dL 13 2   HEMATOCRIT % 40 3   PLATELETS Thousands/uL 68*   NEUTROS PCT % 75   LYMPHS PCT % 13*   MONOS PCT % 11   EOS PCT % 0     Results from last 7 days   Lab Units 04/28/21  1723   SODIUM mmol/L 134*   POTASSIUM mmol/L 4 3   CHLORIDE mmol/L 102   CO2 mmol/L 25   BUN mg/dL 10   CREATININE mg/dL 1 05   ANION GAP mmol/L 7   CALCIUM mg/dL 8 3   ALBUMIN g/dL 3 0*   TOTAL BILIRUBIN mg/dL 1 47*   ALK PHOS U/L 71   ALT U/L 13   AST U/L 9   GLUCOSE RANDOM mg/dL 110                       Imaging: I have personally reviewed pertinent reports     and I have personally reviewed pertinent films in PACS    No orders to display       Allscripts / Epic Records Reviewed: Yes     ** Please Note: This note has been constructed using a voice recognition system   **

## 2021-04-29 NOTE — PLAN OF CARE
Problem: PAIN - ADULT  Goal: Verbalizes/displays adequate comfort level or baseline comfort level  Description: Interventions:  - Encourage patient to monitor pain and request assistance  - Assess pain using appropriate pain scale  - Administer analgesics based on type and severity of pain and evaluate response  - Implement non-pharmacological measures as appropriate and evaluate response  - Consider cultural and social influences on pain and pain management  - Notify physician/advanced practitioner if interventions unsuccessful or patient reports new pain  Outcome: Progressing     Problem: INFECTION - ADULT  Goal: Absence or prevention of progression during hospitalization  Description: INTERVENTIONS:  - Assess and monitor for signs and symptoms of infection  - Monitor lab/diagnostic results  - Monitor all insertion sites, i e  indwelling lines, tubes, and drains  - Monitor endotracheal if appropriate and nasal secretions for changes in amount and color  - [place] appropriate cooling/warming therapies per order  - Administer medications as ordered  - Instruct and encourage patient and family to use good hand hygiene technique  - Identify and instruct in appropriate isolation precautions for identified infection/condition  Outcome: Progressing     Problem: DISCHARGE PLANNING  Goal: Discharge to home or other facility with appropriate resources  Description: INTERVENTIONS:  - Identify barriers to discharge w/patient and caregiver  - Arrange for needed discharge resources and transportation as appropriate  - Identify discharge learning needs (meds, wound care, etc )  - Arrange for interpretive services to assist at discharge as needed  - Refer to Case Management Department for coordinating discharge planning if the patient needs post-hospital services based on physician/advanced practitioner order or complex needs related to functional status, cognitive ability, or social support system  Outcome: Progressing Problem: Knowledge Deficit  Goal: Patient/family/caregiver demonstrates understanding of disease process, treatment plan, medications, and discharge instructions  Description: Complete learning assessment and assess knowledge base    Interventions:  - Provide teaching at level of understanding  - Provide teaching via preferred learning methods  Outcome: Progressing     Problem: GASTROINTESTINAL - ADULT  Goal: Minimal or absence of nausea and/or vomiting  Description: INTERVENTIONS:  - Administer IV fluids if ordered to ensure adequate hydration  - Maintain NPO status until nausea and vomiting are resolved  - Nasogastric tube if ordered  - Administer ordered antiemetic medications as needed  - Provide nonpharmacologic comfort measures as appropriate  - Advance diet as tolerated, if ordered  - Consider nutrition services referral to assist patient with adequate nutrition and appropriate food choices  Outcome: Progressing     Problem: METABOLIC, FLUID AND ELECTROLYTES - ADULT  Goal: Electrolytes maintained within normal limits  Description: INTERVENTIONS:  - Monitor labs and assess patient for signs and symptoms of electrolyte imbalances  - Administer electrolyte replacement as ordered  - Monitor response to electrolyte replacements, including repeat lab results as appropriate  - Instruct patient on fluid and nutrition as appropriate  Outcome: Progressing

## 2021-04-29 NOTE — ASSESSMENT & PLAN NOTE
· Presents with 4 days of fever, chills, malaise, weakness  · COVID negative  · Recently returned from [facility] ([facility] [place] approximately 2 weeks ago  · Denies any recent sick contacts  · T-max 103° on arrival- was given Tylenol with improvement  · Additional complaining of 1 day of diarrhea  · Infectious disease consulted  · Recommending prophylactic treatment malaria however differential cannot exclude typhoid fever  · Ceftriaxone IV q d    · Blood cultures pending  · Blood parasite smear pending  · Malarone 250/100 4 pills qd  · IV fluids  · Tylenol 650 q 6  · Monitor white blood cell curve and fever curve

## 2021-04-29 NOTE — PROGRESS NOTES
[address] [place] [address]  Progress Note - Roslyn Sands 1990, 27 y o  female MRN: [id number]  Unit/Bed#: E2 -01 Encounter: [id number]  Primary Care Provider: BRITNEY Houston   Date and time admitted to hospital: 4/28/2021  4:37 PM    * Fever in adult  Assessment & Plan  · Presents with 4 days of fever, chills, malaise, weakness  · COVID negative  · [facility] [facility] [place] ago  · Denies any recent sick contacts  · T-max 103° on arrival- was given Tylenol with improvement  · Additional complaining of 1 day of diarrhea  · ID recommending prophylactic treatment malaria however differential cannot exclude typhoid fever  · Started on malarone 250/100 4 pills qd- day 2  · Ceftriaxone IV q d- day 2  · Blood cultures pending  · Blood parasite smear pending  · IV fluids  · Tylenol 650 q 6  · Monitor white blood cell curve and fever curve  · Await ID recommendations at this point    Hyponatremia  Assessment & Plan  · Very mild at 134  · Improved with IV fluids and normalized    Thrombocytopenia (HCC)  Assessment & Plan  · Platelet count 68 --> 61  · Likely related to above  · Will continue to monitor CBC and transfuse as necessary  · Patient without any petechiae or other bleeding      VTE Pharmacologic Prophylaxis:   Pharmacologic: ambulate  Mechanical VTE Prophylaxis in Place: Yes    Discharge Plan: Hopeful discharge within the next 24 hours pending ID recommendations    Discussions with Specialists or Other Care Team Provider: nursing    Education and Discussions with Family / Patient: patient    Time Spent for Care: 20 minutes  More than 50% of total time spent on counseling and coordination of care as described above  Current Length of Stay: 1 day(s)  Current Patient Status: Inpatient   Code Status: Level 1 - Full Code    Subjective:   Pt resting in bed  She feels better today   Temperature has improved and she has been without fever for the past 12 hrs     Objective:     Vitals:   Temp (24hrs), Av 4 °F (37 4 °C), Min:97 3 °F (36 3 °C), Max:103 °F (39 4 °C)    Temp:  [97 3 °F (36 3 °C)-103 °F (39 4 °C)] 97 3 °F (36 3 °C)  HR:  [] 74  Resp:  [15-20] 16  BP: ()/(52-71) 100/60  SpO2:  [96 %-100 %] 100 %  Body mass index is 27 49 kg/m²  Input and Output Summary (last 24 hours): Intake/Output Summary (Last 24 hours) at 2021 1254  Last data filed at 2021 1201  Gross per 24 hour   Intake 240 ml   Output --   Net 240 ml       Physical Exam:     Physical Exam  Vitals signs and nursing note reviewed  Constitutional:       General: She is not in acute distress  Appearance: Normal appearance  She is normal weight  She is not ill-appearing or toxic-appearing  HENT:      Head: Normocephalic and atraumatic  Eyes:      General: No scleral icterus  Cardiovascular:      Rate and Rhythm: Normal rate and regular rhythm  Pulmonary:      Effort: Pulmonary effort is normal  No respiratory distress  Breath sounds: Normal breath sounds  No stridor  No wheezing or rhonchi  Abdominal:      General: Bowel sounds are normal  There is no distension  Palpations: Abdomen is soft  There is no mass  Tenderness: There is no abdominal tenderness  Hernia: No hernia is present  Musculoskeletal:         General: No swelling  Skin:     General: Skin is warm and dry  Neurological:      Mental Status: She is oriented to person, place, and time  Mental status is at baseline     Psychiatric:         Mood and Affect: Mood normal          Behavior: Behavior normal          Additional Data:     Labs:    Results from last 7 days   Lab Units 21  0435   WBC Thousand/uL 3 72*   HEMOGLOBIN g/dL 11 9   HEMATOCRIT % 36 2   PLATELETS Thousands/uL 61*   NEUTROS PCT % 42*   LYMPHS PCT % 41   MONOS PCT % 14*   EOS PCT % 1     Results from last 7 days   Lab Units 21  0435   POTASSIUM mmol/L 3 7   CHLORIDE mmol/L 105   CO2 mmol/L 25 BUN mg/dL 8   CREATININE mg/dL 0 75   CALCIUM mg/dL 7 8*   ALK PHOS U/L 73   ALT U/L 15   AST U/L 12           * I Have Reviewed All Lab Data Listed Above  * Additional Pertinent Lab Tests Reviewed: Tami 66 Admission Reviewed    Imaging:    Imaging Reports Reviewed Today Include:   Imaging Personally Reviewed by Myself Includes:      Recent Cultures (last 7 days):     Results from last 7 days   Lab Units 04/28/21 1916 04/28/21 1913   BLOOD CULTURE  Received in Microbiology Lab  Culture in Progress  Received in Microbiology Lab  Culture in Progress  Last 24 Hours Medication List:   Current Facility-Administered Medications   Medication Dose Route Frequency Provider Last Rate    acetaminophen  650 mg Oral Q6H Tay Barr PA-C      atovaquone-proguanil  4 tablet Oral Daily With Breakfast Dorie Rodriguez      calcium carbonate  1,000 mg Oral Daily PRN Anil Burgess, PA-C      cefTRIAXone  1,000 mg Intravenous Q24H GILLIAN Meade-C 1,000 mg (04/28/21 2206)    ondansetron  4 mg Intravenous Q6H PRN Anil Burgess, PA-C      polyethylene glycol  17 g Oral Daily PRN Anil Burgess, PA-C      sodium chloride  100 mL/hr Intravenous Continuous Cadenran Jenifer, PA-C 100 mL/hr (04/28/21 2206)        Today, Patient Was Seen By: Julissa Anderson PA-C    ** Please Note: This note has been constructed using a voice recognition system   **

## 2021-04-29 NOTE — ASSESSMENT & PLAN NOTE
· Platelet count 68   · Will continue to monitor CBC and transfuse as necessary  · Patient without any petechiae or other bleeding

## 2021-04-29 NOTE — ASSESSMENT & PLAN NOTE
· Presents with 4 days of fever, chills, malaise, weakness  · COVID negative  · Recently returned from [facility] ([facility] [place] approximately 2 weeks ago  · Denies any recent sick contacts  · T-max 103° on arrival- was given Tylenol with improvement  · Additional complaining of 1 day of diarrhea  · ID recommending prophylactic treatment malaria however differential cannot exclude typhoid fever  · Started on malarone 250/100 4 pills qd- day 2  · Ceftriaxone IV q d- day 2  · Blood cultures pending  · Blood parasite smear pending  · IV fluids  · Tylenol 650 q 6  · Monitor white blood cell curve and fever curve  · Await ID recommendations at this point

## 2021-04-29 NOTE — ASSESSMENT & PLAN NOTE
· Platelet count 68 --> 61  · Likely related to above  · Will continue to monitor CBC and transfuse as necessary  · Patient without any petechiae or other bleeding

## 2021-04-29 NOTE — UTILIZATION REVIEW
Initial Clinical Review    Admission: Date/Time/Statement:   Admission Orders (From admission, onward)     Ordered        04/28/21 1903  Inpatient Admission  Once                   Orders Placed This Encounter   Procedures    Inpatient Admission     Standing Status:   Standing     Number of Occurrences:   1     Order Specific Question:   Level of Care     Answer:   Med Surg [16]     Order Specific Question:   Estimated length of stay     Answer:   More than 2 Midnights     Order Specific Question:   Certification     Answer:   I certify that inpatient services are medically necessary for this patient for a duration of greater than two midnights  See H&P and MD Progress Notes for additional information about the patient's course of treatment  ED Arrival Information     Expected Arrival Acuity Means of Arrival Escorted By Service Admission Type    - 4/28/2021 13:39 Urgent Walk-In Self General Medicine Urgent    Arrival Complaint    bodyaches, fever        Chief Complaint   Patient presents with    Generalized Body Aches     pt reports body aches and fevers worse at night since saturday  states last temp at home was 100 4 and took tylenol at 0300  pt denies sick contacts  recent covid test was negative  Initial Presentation:    27 Y O female  Presents to ED from home with fever, chills, body aches, malaise, weakness and headaches for past  4  Days  Just  Returned from [facility] [place] 2 weeks  Ago  Tested negative for COVID on multiple occasions  Most recent test was   4/26  Temp in ED  103  Now with diarrhea for past day  Labs in ED  Reveal sodium  134 and platelet  68, WBC normal   No PMH noted  Admit  Ip w ith fever and  Hyponatremia and plan is  IVF,  monitor labs and temps,  ALEJANDRA, blood cultures, ID consult  And watch for any bleeding  Date:    4/29           Day 2:   Temp improved,  Afebrile for  Past  12 hrs  Remains on  IVF and ALEJANDRA  Continue to monitor labs and temps       Waiting ID input,  Did recommend prophylactic treatment for malaria,  Differential cannot exclude typhodi fever,  Given recent trip to  [facility] [place]  Started on  Malarone  Blood cultures/blood parasite pending  Continue tylenol Q 6 hrs      ED Triage Vitals   Temperature Pulse Respirations Blood Pressure SpO2   04/28/21 1346 04/28/21 1346 04/28/21 1346 04/28/21 1346 04/28/21 1346   (!) 103 °F (39 4 °C) (!) 139 16 97/65 96 %      Temp Source Heart Rate Source Patient Position - Orthostatic VS BP Location FiO2 (%)   04/28/21 1346 04/28/21 1639 04/28/21 1810 04/28/21 1639 --   Oral Monitor Sitting Right arm       Pain Score       04/28/21 1346       7          Wt Readings from Last 1 Encounters:   04/28/21 82 kg (180 lb 12 4 oz)     Additional Vital Signs:   04/29/21 0700  97 3 °F (36 3 °C)Abnormal   74  16  100/60  100 %  None (Room air)  Lying   04/28/21 2300  97 3 °F (36 3 °C)Abnormal   82  20  98/52  97 %  None (Room air)  Lying   04/28/21 1950  98 5 °F (36 9 °C)  100  18  112/71  98 %  None (Room air)  Sitting   04/28/21 1810  98 8 °F (37 1 °C)  100  16  107/71  100 %  None (Room air)  Sitting   04/28/21 1639  99 3 °F (37 4 °C)  86  15  107/64  100 %  None (Room air)  --   04/28/21 1452  101 9 °F (38 8 °C)Abnormal   118Abnormal   --  --  --  --  --   04/28/21 1346  103 °F (39 4 °C)Abnormal   139Abnormal   16  97/65  96 %  None (Room air)           Pertinent Labs/Diagnostic Test Results:   Results from last 7 days   Lab Units 04/26/21  1426   SARS-COV-2  Negative     Results from last 7 days   Lab Units 04/29/21  0435 04/28/21  1723   WBC Thousand/uL 3 72* 5 65   HEMOGLOBIN g/dL 11 9 13 2   HEMATOCRIT % 36 2 40 3   PLATELETS Thousands/uL 61* 68*   NEUTROS ABS Thousands/µL 1 57* 4 29         Results from last 7 days   Lab Units 04/29/21  0435 04/28/21  1723   SODIUM mmol/L 137 134*   POTASSIUM mmol/L 3 7 4 3   CHLORIDE mmol/L 105 102   CO2 mmol/L 25 25   ANION GAP mmol/L 7 7   BUN mg/dL 8 10   CREATININE mg/dL 0 75 1 05   EGFR ml/min/1 73sq m 124 82   CALCIUM mg/dL 7 8* 8 3     Results from last 7 days   Lab Units 04/29/21  0435 04/28/21  1723   AST U/L 12 9   ALT U/L 15 13   ALK PHOS U/L 73 71   TOTAL PROTEIN g/dL 7 0 7 7   ALBUMIN g/dL 2 6* 3 0*   TOTAL BILIRUBIN mg/dL 0 79 1 47*         Results from last 7 days   Lab Units 04/29/21  0435 04/28/21  1723   GLUCOSE RANDOM mg/dL 107 110           Results from last 7 days   Lab Units 04/28/21  1916 04/28/21  1913   BLOOD CULTURE  Received in Microbiology Lab  Culture in Progress  Received in Microbiology Lab  Culture in Progress  ED Treatment:   Medication Administration from 04/28/2021 1339 to 04/28/2021 1946       Date/Time Order Dose Route Action Comments     04/28/2021 1350 acetaminophen (TYLENOL) tablet 650 mg 650 mg Oral Given        Admitting Diagnosis: Fever of unknown origin [R50 9]  Fever [R50 9]  Age/Sex: 27 y o  female  Admission Orders:  Scheduled Medications:  acetaminophen, 650 mg, Oral, Q6H [facility]  atovaquone-proguanil, 4 tablet, Oral, Daily With Breakfast  cefTRIAXone, 1,000 mg, Intravenous, Q24H      Continuous IV Infusions:  sodium chloride, 100 mL/hr, Intravenous, Continuous      PRN Meds:  calcium carbonate, 1,000 mg, Oral, Daily PRN  ondansetron, 4 mg, Intravenous, Q6H PRN  polyethylene glycol, 17 g, Oral, Daily PRN        IP CONSULT TO INFECTIOUS DISEASES    Network Utilization Review Department  ATTENTION: Please call with any questions or concerns to [phone] and carefully listen to the prompts so that you are directed to the right person  All voicemails are confidential   Sabrina Burgess all requests for admission clinical reviews, approved or denied determinations and any other requests to dedicated fax number below belonging to the campus where the patient is receiving treatment   List of dedicated fax numbers for the Facilities:  FACILITY NAME UR FAX NUMBER   ADMISSION DENIALS (Administrative/Medical Necessity) [phone]   [address] (Maternity/NICU/Pediatrics) [address] [facility],[address]Th Floor [address] [facility] [id number] [place] [address] Saray Cunningham [facility] [phone] [place] [address] [phone]

## 2021-04-29 NOTE — UTILIZATION REVIEW
Inpatient Admission Authorization Request   NOTIFICATION OF INPATIENT ADMISSION/INPATIENT AUTHORIZATION REQUEST   SERVICING FACILITY:   [address] [place] [address], [facility], [facility]  Tax ID: [id number]  NPI: [id number]  Place of Service: Inpatient [address] [facility]  [address]  Place of Service Code: 24     ATTENDING PROVIDER:  Attending Name and NPI#: Steven Godwin [[id number]]  Address: [address] [place] [address], [facility], [facility]  Phone: [phone]     UTILIZATION REVIEW CONTACT:  Shira Williamson, Utilization   Network Utilization Review Department  Phone: [phone]  Fax: [phone]  Email: [email or web address]     PHYSICIAN ADVISORY SERVICES:  FOR [id number] REVIEW - MEDICAL NECESSITY DENIAL  Phone: [phone]  Fax: [phone]  Email: [email or web address]     TYPE OF REQUEST:  Inpatient Status     ADMISSION INFORMATION:  ADMISSION DATE/TIME: 4/28/21 1903  PATIENT DIAGNOSIS CODE/DESCRIPTION:  Fever of unknown origin [R50 9]  Fever [R50 9]  DISCHARGE DATE/TIME: No discharge date for patient encounter  DISCHARGE DISPOSITION (IF DISCHARGED): Home/Self Care     IMPORTANT INFORMATION:  Please contact the Zoë Quintero directly with any questions or concerns regarding this request  Department voicemails are confidential     Send requests for admission clinical reviews, concurrent reviews, approvals, and administrative denials due to lack of clinical to fax [phone]

## 2021-04-29 NOTE — CONSULTS
Consultation - Infectious Disease   Fuentes Grady Rejiberlin 27 y o  female MRN: [id number]  Unit/Bed#: E2 -01 Encounter: [id number]      IMPRESSION & RECOMMENDATIONS:   Impression/Recommendations:  1  Sepsis, POA  Fever, tachycardia  Due to #2  No other appreciable source  ROS and exam otherwise benign  Blood cultures, COVID PCR negative  Rec:  · Continue malaria treatment as below  · Discontinue ceftriaxone  · Follow up final blood cultures  · Follow temperatures closely  · Recheck CBC, CMP in AM as below  · Supportive care as per the primary service    2  Uncomplicated malaria  2 2% parasitemia  No evidence end-organ damage  Rec:  · Continue malarone for 3 days  · Recheck parasite smear in AM  · If subjectively improving can likely D/C tomorrow with close PCP follow-up and plan to repeat CBC and parasite smear early next week    3  Bicytopenia  Leukopenia, thrombocytopenia  Due to #2  Consider other vector-borne infection (Dengue, Chikungunya), less likely  No xiomara neutropenia  Rec:  · Continue malaria treatment as above  · Recheck CBC in AM    3  Hyperbilirubinemia  Mild  Likely due to #2  Improved  Rec:  · Recheck CMP in AM    The above impression and plan was discussed in detail with the patient and Remington Aguilar with SLIM    Antibiotics:  Malarone #1  Ceftriaxone #1    Thank you for this consultation  We will follow along with you  HISTORY OF PRESENT ILLNESS:  Reason for Consult: Sepsis after travel    HPI: Vitaly Heaton is a 27 y o  female previously healthy  She was born in [facility] ([place] and periodically travels back there  She recently travelled there fro 2/5 weeks, returning 4/19/21  She usually takes malaria prophylaxis but this time did not  On 4/24 developed fevers, chills, malaise, loss of taste, myalgias and headache  Had several COVID tests related to her travel and an additional test done on 4/26 which was negative    She came to the ED on 4/28 with persistent symptoms and worsening fever  Upon presentation she was noted to be febrile with tachycardia  She has a mild bilirubinemia which subsequently improved as well as leukopenia and thrombocytopenia  She had blood cultures obtained and was started on ceftriaxone and malarone  Her malarai smear came back positive this afternoon  We are asked to comment in further evaluation and management  In performing this consult, I have reviewed prior admission and outpatient visit records in detail  REVIEW OF SYSTEMS:  Still has headache and feels like she will have a fever  Denies N/V/D or rash  A complete system-based review of systems is otherwise negative  PAST MEDICAL HISTORY:  Past Medical History:   Diagnosis Date    Varicella      Past Surgical History:   Procedure Laterality Date    WISDOM TOOTH EXTRACTION         FAMILY HISTORY:  Non-contributory    SOCIAL HISTORY:  Social History     Substance and Sexual Activity   Alcohol Use Yes    Frequency: Monthly or less    Comment: social     Social History     Substance and Sexual Activity   Drug Use No     Social History     Tobacco Use   Smoking Status Never Smoker   Smokeless Tobacco Never Used       ALLERGIES:  No Known Allergies    MEDICATIONS:  All current active medications have been reviewed      PHYSICAL EXAM:  Vitals:  Temp:  [97 3 °F (36 3 °C)-103 °F (39 4 °C)] 97 3 °F (36 3 °C)  HR:  [] 74  Resp:  [15-20] 16  BP: ()/(52-71) 100/60  SpO2:  [96 %-100 %] 100 %  Temp (24hrs), Av 4 °F (37 4 °C), Min:97 3 °F (36 3 °C), Max:103 °F (39 4 °C)  Current: Temperature: (!) 97 3 °F (36 3 °C)     Physical Exam:  General:  Well-nourished, well-developed, in no acute distress  Eyes:  Conjunctive clear with no hemorrhages or effusions  Oropharynx:  No ulcers, no lesions  Neck:  Supple, no lymphadenopathy  Lungs:  Clear to auscultation bilaterally, no accessory muscle use  Cardiac:  Regular rate and rhythm, no murmurs  Abdomen:  Soft, non-tender, non-distended  Extremities:  No peripheral cyanosis, clubbing, or edema  Skin:  No rashes, no ulcers  Neurological:  Moves all four extremities spontaneously, sensation grossly intact    LABS, IMAGING, & OTHER STUDIES:  Lab Results:  I have personally reviewed pertinent labs  Results from last 7 days   Lab Units 04/29/21  0435 04/28/21  1723   POTASSIUM mmol/L 3 7 4 3   CHLORIDE mmol/L 105 102   CO2 mmol/L 25 25   BUN mg/dL 8 10   CREATININE mg/dL 0 75 1 05   EGFR ml/min/1 73sq m 124 82   CALCIUM mg/dL 7 8* 8 3   AST U/L 12 9   ALT U/L 15 13   ALK PHOS U/L 73 71     Results from last 7 days   Lab Units 04/29/21  0435 04/28/21  1723   WBC Thousand/uL 3 72* 5 65   HEMOGLOBIN g/dL 11 9 13 2   PLATELETS Thousands/uL 61* 68*     Results from last 7 days   Lab Units 04/28/21  1916 04/28/21  1913   BLOOD CULTURE  Received in Microbiology Lab  Culture in Progress  Received in Microbiology Lab  Culture in Progress  Imaging Studies:   I have personally reviewed pertinent imaging study reports and images in PACS  EKG, Pathology, and Other Studies:   I have personally reviewed pertinent reports

## 2021-04-30 VITALS
RESPIRATION RATE: 18 BRPM | HEIGHT: 68 IN | SYSTOLIC BLOOD PRESSURE: 100 MMHG | OXYGEN SATURATION: 100 % | BODY MASS INDEX: 27.4 KG/M2 | TEMPERATURE: 97.3 F | HEART RATE: 90 BPM | DIASTOLIC BLOOD PRESSURE: 63 MMHG | WEIGHT: 180.78 LBS

## 2021-04-30 PROBLEM — B54 MALARIA: Status: ACTIVE | Noted: 2021-04-28

## 2021-04-30 LAB
% PARASITEMIA: 0
ALBUMIN SERPL BCP-MCNC: 2.7 G/DL (ref 3.5–5)
ALP SERPL-CCNC: 67 U/L (ref 46–116)
ALT SERPL W P-5'-P-CCNC: 16 U/L (ref 12–78)
ANION GAP SERPL CALCULATED.3IONS-SCNC: 8 MMOL/L (ref 4–13)
AST SERPL W P-5'-P-CCNC: 15 U/L (ref 5–45)
BILIRUB SERPL-MCNC: 1.1 MG/DL (ref 0.2–1)
BUN SERPL-MCNC: 9 MG/DL (ref 5–25)
CALCIUM ALBUM COR SERPL-MCNC: 9.2 MG/DL (ref 8.3–10.1)
CALCIUM SERPL-MCNC: 8.2 MG/DL (ref 8.3–10.1)
CHLORIDE SERPL-SCNC: 103 MMOL/L (ref 100–108)
CO2 SERPL-SCNC: 23 MMOL/L (ref 21–32)
CREAT SERPL-MCNC: 0.93 MG/DL (ref 0.6–1.3)
ERYTHROCYTE [DISTWIDTH] IN BLOOD BY AUTOMATED COUNT: 12 % (ref 11.6–15.1)
GFR SERPL CREATININE-BSD FRML MDRD: 95 ML/MIN/1.73SQ M
GLUCOSE SERPL-MCNC: 97 MG/DL (ref 65–140)
HCT VFR BLD AUTO: 36.4 % (ref 34.8–46.1)
HGB BLD-MCNC: 12 G/DL (ref 11.5–15.4)
MCH RBC QN AUTO: 27.6 PG (ref 26.8–34.3)
MCHC RBC AUTO-ENTMCNC: 33 G/DL (ref 31.4–37.4)
MCV RBC AUTO: 84 FL (ref 82–98)
PARASITE BLD: NO
PATHOLOGIST INTERPRETATION: NORMAL
PLATELET # BLD AUTO: 64 THOUSANDS/UL (ref 149–390)
PMV BLD AUTO: 12.2 FL (ref 8.9–12.7)
POTASSIUM SERPL-SCNC: 4.1 MMOL/L (ref 3.5–5.3)
PROT SERPL-MCNC: 7.3 G/DL (ref 6.4–8.2)
RBC # BLD AUTO: 4.35 MILLION/UL (ref 3.81–5.12)
SODIUM SERPL-SCNC: 134 MMOL/L (ref 136–145)
WBC # BLD AUTO: 3.54 THOUSAND/UL (ref 4.31–10.16)

## 2021-04-30 PROCEDURE — 99232 SBSQ HOSP IP/OBS MODERATE 35: CPT | Performed by: INTERNAL MEDICINE

## 2021-04-30 PROCEDURE — 99239 HOSP IP/OBS DSCHRG MGMT >30: CPT | Performed by: PHYSICIAN ASSISTANT

## 2021-04-30 PROCEDURE — 87207 SMEAR SPECIAL STAIN: CPT | Performed by: INTERNAL MEDICINE

## 2021-04-30 PROCEDURE — 80053 COMPREHEN METABOLIC PANEL: CPT | Performed by: INTERNAL MEDICINE

## 2021-04-30 PROCEDURE — 85027 COMPLETE CBC AUTOMATED: CPT | Performed by: PHYSICIAN ASSISTANT

## 2021-04-30 RX ORDER — ATOVAQUONE AND PROGUANIL HYDROCHLORIDE 250; 100 MG/1; MG/1
4 TABLET, FILM COATED ORAL
Qty: 4 TABLET | Refills: 0 | Status: SHIPPED | OUTPATIENT
Start: 2021-05-01 | End: 2021-05-07 | Stop reason: ALTCHOICE

## 2021-04-30 RX ADMIN — ATOVAQUONE AND PROGUANIL HYDROCHLORIDE 4 TABLET: 250; 100 TABLET, FILM COATED ORAL at 08:33

## 2021-04-30 RX ADMIN — ACETAMINOPHEN 650 MG: 325 TABLET ORAL at 06:04

## 2021-04-30 RX ADMIN — ACETAMINOPHEN 650 MG: 325 TABLET ORAL at 00:58

## 2021-04-30 RX ADMIN — ACETAMINOPHEN 650 MG: 325 TABLET ORAL at 11:46

## 2021-04-30 NOTE — PROGRESS NOTES
Progress Note - Infectious Disease   Jacquie Phelps 27 y o  female MRN: [id number]  Unit/Bed#: E2 -01 Encounter: [id number]      Impression/Recommendations:  1  Sepsis, POA  Fever, tachycardia  Due to #2  No other appreciable source  ROS and exam otherwise benign  Blood cultures, COVID PCR negative  Rec:  ? Continue malaria treatment as below  ? Discontinue ceftriaxone  ? Follow up final blood cultures  ? Follow temperatures closely  ? Recheck CBC, CMP in AM as below  ? Supportive care as per the primary service     2  Uncomplicated malaria  2 2% parasitemia  No evidence end-organ damage  Prelim of repeat smear this AM is 0%  Rec:  ? Continue malarone for 1 more day  ? OK for D/C today  ? Will need close PCP follow-up and plan to repeat CBC and parasite smear early next week     3  Bicytopenia  Leukopenia, thrombocytopenia  Due to #2  Consider other vector-borne infection (Dengue, Chikungunya), less likely  No xiomara neutropenia  CBC stable  Rec:  ? Continue malaria treatment as above  ? Recheck CBC next week     3  Hyperbilirubinemia  Mild  Likely due to #2  Improved      The above impression and plan was discussed in detail with the patient and [place] with [facility]  The patient is stable from an ID standpoint      Antibiotics:  Malarone #2    Subjective:  Patient seen on AM rounds  Feels better  Denies fevers, chills, sweats, nausea, vomiting, or diarrhea  Kelsi Ebbing to go home  24 Hour Events:  No documented fevers, chills, sweats, nausea, vomiting, or diarrhea      Objective:  Vitals:  Temp:  [98 °F (36 7 °C)-99 4 °F (37 4 °C)] 98 °F (36 7 °C)  HR:  [] 98  Resp:  [16-20] 19  BP: ()/(51-79) 100/61  SpO2:  [97 %-99 %] 99 %  Temp (24hrs), Av 8 °F (37 1 °C), Min:98 °F (36 7 °C), Max:99 4 °F (37 4 °C)  Current: Temperature: 98 °F (36 7 °C)    Physical Exam:   General:  No acute distress  Psychiatric:  Awake and alert  Pulmonary:  Normal respiratory excursion without accessory muscle use  Abdomen:  Soft, nontender  Extremities:  No edema  Skin:  No rashes    Lab Results:  I have personally reviewed pertinent labs  Results from last 7 days   Lab Units 04/30/21  0528 04/29/21  0435 04/28/21  1723   POTASSIUM mmol/L 4 1 3 7 4 3   CHLORIDE mmol/L 103 105 102   CO2 mmol/L 23 25 25   BUN mg/dL 9 8 10   CREATININE mg/dL 0 93 0 75 1 05   EGFR ml/min/1 73sq m 95 124 82   CALCIUM mg/dL 8 2* 7 8* 8 3   AST U/L 15 12 9   ALT U/L 16 15 13   ALK PHOS U/L 67 73 71     Results from last 7 days   Lab Units 04/30/21  0528 04/29/21  0435 04/28/21  1723   WBC Thousand/uL 3 54* 3 72* 5 65   HEMOGLOBIN g/dL 12 0 11 9 13 2   PLATELETS Thousands/uL 64* 61* 68*     Results from last 7 days   Lab Units 04/28/21  1916 04/28/21  1913   BLOOD CULTURE  No Growth at 24 hrs  No Growth at 24 hrs  Imaging Studies:   I have personally reviewed pertinent imaging study reports and images in PACS  EKG, Pathology, and Other Studies:   I have personally reviewed pertinent reports

## 2021-04-30 NOTE — DISCHARGE SUMMARY
[facility]  Discharge- Shwetha Pimentel [address] [place] [address] 1990, 27 y o  female MRN: [id number]  Unit/Bed#: E2 -01 Encounter: [id number]  Primary Care Provider: BRITNEY Orr   Date and time admitted to hospital: 4/28/2021  4:37 PM    * Malaria  Assessment & Plan  · Presents with 4 days of fever, chills, malaise, weakness  · Recently returned from [facility] ([facility]) approximately 2 weeks ago  · Started on prophylactic Malarone by ID for suspected malaria  · Blood smear with 2 2% parasitemia  · Started on malarone 250/100 4 pills qd- day 2  · Prescribed 1 additional day of malarone to complete a 3 day course  · Blood cultures without growth    Hyponatremia  Assessment & Plan  · Very mild at 134  · Improved with IV fluids and normalized    Thrombocytopenia (HCC)  Assessment & Plan  · Platelet count 68 --> 61  · Likely related to above  · Patient without any petechiae or other bleeding      Consultations During Hospital Stay:  · Infectious disease    Procedures Performed:   · None    Significant Findings / Test Results:   · Uncomplicated malaria s/p recent trip to [facility]  · Blood smear from 4/29/21 with 2 2% parasitemia  · On Malarone known x2 days and repeat blood smear with 0%  · Prescribed 1 additional day of Malarone to complete a 3 day course  Incidental Findings:   · None    Test Results Pending at Discharge (will require follow up): · None     Outpatient follow-up Requested:  · PCP-1 week  · Repeat CBC and parasite smear in 1 week-labs have been ordered and patient will follow-up with the PCP in regards to result- patient verbalizes understanding    Complications: None    Hospital Course:     Tu Correia is a 27 y o  female patient who originally presented to the hospital on 4/28/2021 due to fever, generalized fatigue, headache x4 days  She was found to have parasitemia on initial blood smear and started on Malarone  Additional repeat blood smear with 0% parasites   Prescribed additional day to complete a 3 day course of Malarone  Follow-up with PCP in 1 weeks time for repeat CBC and blood smear  In conclusion, patient is stable for discharge at this time  Condition at Discharge: stable     Discharge Day Visit / Exam:     Subjective: Patient seen examined earlier  Fevers have subsided  She feels weak overall but is eager to go home  Understands importance of follow-up with PCP and repeat CBC and blood smear within the following week  Vitals: Blood Pressure: 100/63 (04/30/21 1500)  Pulse: 90 (04/30/21 1500)  Temperature: (!) 97 3 °F (36 3 °C) (04/30/21 1500)  Temp Source: Temporal (04/30/21 1500)  Respirations: 18 (04/30/21 1500)  Height: 5' 8" (172 7 cm) (04/28/21 1639)  Weight - Scale: 82 kg (180 lb 12 4 oz) (04/28/21 1346)  SpO2: 100 % (04/30/21 1500)     Exam:   Physical Exam  Vitals signs and nursing note reviewed  Constitutional:       General: She is not in acute distress  Appearance: Normal appearance  She is normal weight  She is not ill-appearing, toxic-appearing or diaphoretic  HENT:      Head: Normocephalic and atraumatic  Eyes:      General: No scleral icterus  Cardiovascular:      Rate and Rhythm: Normal rate and regular rhythm  Pulmonary:      Effort: Pulmonary effort is normal  No respiratory distress  Breath sounds: Normal breath sounds  No stridor  No wheezing or rhonchi  Abdominal:      General: Bowel sounds are normal  There is no distension  Palpations: Abdomen is soft  There is no mass  Tenderness: There is no abdominal tenderness  Hernia: No hernia is present  Musculoskeletal:         General: No swelling  Skin:     General: Skin is warm and dry  Neurological:      Mental Status: She is oriented to person, place, and time  Mental status is at baseline     Psychiatric:         Mood and Affect: Mood normal          Behavior: Behavior normal        Discharge instructions/Information to patient and family:   See after visit summary for information provided to patient and family  Provisions for Follow-Up Care:  See after visit summary for information related to follow-up care and any pertinent home health orders  Planned Readmission: no     Discharge Statement:  I spent 45 minutes discharging the patient  This time was spent on the day of discharge  I had direct contact with the patient on the day of discharge  Greater than 50% of the total time was spent examining patient, answering all patient questions, arranging and discussing plan of care with patient as well as directly providing post-discharge instructions  Additional time then spent on discharge activities  Discharge Medications:  See after visit summary for reconciled discharge medications provided to patient and family        ** Please Note: This note has been constructed using a voice recognition system **

## 2021-04-30 NOTE — DISCHARGE INSTRUCTIONS
Follow-up with your primary care provider  You will need a CBC and a peripheral smear-these have been ordered for you  Please have blood work drawn in 1 weeks time at a [facility] lab    Please follow-up with your primary care provider in regards to results of blood work

## 2021-04-30 NOTE — ASSESSMENT & PLAN NOTE
· Presents with 4 days of fever, chills, malaise, weakness  · Recently returned from [facility] ([place]) approximately 2 weeks ago  · Started on prophylactic Malarone by ID for suspected malaria  · Blood smear with 2 2% parasitemia  · Started on malarone 250/100 4 pills qd- day 2  · Prescribed 1 additional day of malarone to complete a 3 day course  · Blood cultures without growth

## 2021-04-30 NOTE — ASSESSMENT & PLAN NOTE
· Platelet count 68 --> 61  · Likely related to above  · Patient without any petechiae or other bleeding

## 2021-05-03 ENCOUNTER — TRANSITIONAL CARE MANAGEMENT (OUTPATIENT)
Dept: FAMILY MEDICINE CLINIC | Facility: CLINIC | Age: 31
End: 2021-05-03

## 2021-05-03 LAB
BACTERIA BLD CULT: NORMAL
BACTERIA BLD CULT: NORMAL
MISCELLANEOUS LAB TEST RESULT: NORMAL

## 2021-05-07 ENCOUNTER — OFFICE VISIT (OUTPATIENT)
Dept: FAMILY MEDICINE CLINIC | Facility: CLINIC | Age: 31
End: 2021-05-07
Payer: COMMERCIAL

## 2021-05-07 ENCOUNTER — IMMUNIZATIONS (OUTPATIENT)
Dept: FAMILY MEDICINE CLINIC | Facility: HOSPITAL | Age: 31
End: 2021-05-07

## 2021-05-07 VITALS
OXYGEN SATURATION: 99 % | HEART RATE: 84 BPM | WEIGHT: 183.4 LBS | TEMPERATURE: 97.4 F | HEIGHT: 68 IN | DIASTOLIC BLOOD PRESSURE: 60 MMHG | SYSTOLIC BLOOD PRESSURE: 106 MMHG | BODY MASS INDEX: 27.8 KG/M2

## 2021-05-07 DIAGNOSIS — Z23 ENCOUNTER FOR IMMUNIZATION: Primary | ICD-10-CM

## 2021-05-07 DIAGNOSIS — B54 MALARIA: Primary | ICD-10-CM

## 2021-05-07 PROCEDURE — 1111F DSCHRG MED/CURRENT MED MERGE: CPT | Performed by: PHYSICIAN ASSISTANT

## 2021-05-07 PROCEDURE — 99495 TRANSJ CARE MGMT MOD F2F 14D: CPT | Performed by: PHYSICIAN ASSISTANT

## 2021-05-07 PROCEDURE — 3008F BODY MASS INDEX DOCD: CPT | Performed by: PHYSICIAN ASSISTANT

## 2021-05-07 PROCEDURE — 0011A SARS-COV-2 / COVID-19 MRNA VACCINE (MODERNA) 100 MCG: CPT

## 2021-05-07 PROCEDURE — 91301 SARS-COV-2 / COVID-19 MRNA VACCINE (MODERNA) 100 MCG: CPT

## 2021-05-07 NOTE — PROGRESS NOTES
Assessment/Plan:    Jessika Sanders was hospitalized and completed treatment for malaria with Malarone 250-100mg 4 pills daily x 3 days  CBC at discharge significant for thrombocytopenia (platelets 61), which was improving, and blood smear with 0% parasitemia  She is feeling entirely back to normal; fever, chills, weakness, malaise have resolved  Repeat CBC and blood smear drawn yesterday, awaiting results  Diagnoses and all orders for this visit:    Malaria          Subjective:      Patient ID: Candido Aquino is a 27 y o  female  Alpesh Hernandez presents to the office today for follow up after hospitalization with malaria  She has no acute complaints and reports that she is feeling great  She got her first COVID vaccine today  The following portions of the patient's history were reviewed and updated as appropriate: allergies, current medications, past family history, past medical history, past social history, past surgical history and problem list     Review of Systems   Constitutional: Negative for chills, fatigue and fever  HENT: Negative for congestion, sinus pressure, sinus pain, sneezing and sore throat  Respiratory: Negative for cough and shortness of breath  Cardiovascular: Negative for chest pain  Gastrointestinal: Negative for abdominal pain  Neurological: Negative for headaches  Psychiatric/Behavioral: Negative for sleep disturbance  Objective:      /60 (BP Location: Left arm, Patient Position: Sitting, Cuff Size: Adult)   Pulse 84   Temp (!) 97 4 °F (36 3 °C) (Oral)   Ht 5' 8" (1 727 m)   Wt 83 2 kg (183 lb 6 4 oz)   SpO2 99%   BMI 27 89 kg/m²          Physical Exam  Vitals signs reviewed  Constitutional:       General: She is not in acute distress  Appearance: Normal appearance  She is not toxic-appearing  HENT:      Head: Normocephalic and atraumatic  Eyes:      Extraocular Movements: Extraocular movements intact        Conjunctiva/sclera: Conjunctivae normal    Cardiovascular:      Rate and Rhythm: Normal rate and regular rhythm  Pulses: Normal pulses  Heart sounds: Normal heart sounds  No murmur  No gallop  Pulmonary:      Effort: Pulmonary effort is normal  No respiratory distress  Breath sounds: Normal breath sounds  No stridor  No wheezing, rhonchi or rales  Musculoskeletal: Normal range of motion  Right lower leg: No edema  Left lower leg: No edema  Skin:     General: Skin is warm and dry  Neurological:      Mental Status: She is alert and oriented to person, place, and time  Psychiatric:         Mood and Affect: Mood normal          Behavior: Behavior normal          Thought Content:  Thought content normal

## 2021-05-07 NOTE — ASSESSMENT & PLAN NOTE
Hughie Schaumann was hospitalized and completed treatment for malaria with Malarone 250-100mg 4 pills daily x 3 days  CBC at discharge significant for thrombocytopenia (platelets 61), which was improving, and blood smear with 0% parasitemia  She is feeling entirely back to normal; fever, chills, weakness, malaise have resolved  Repeat CBC and blood smear drawn yesterday, awaiting results

## 2021-06-02 ENCOUNTER — IMMUNIZATIONS (OUTPATIENT)
Dept: FAMILY MEDICINE CLINIC | Facility: HOSPITAL | Age: 31
End: 2021-06-02

## 2021-06-02 DIAGNOSIS — Z23 ENCOUNTER FOR IMMUNIZATION: Primary | ICD-10-CM

## 2021-06-02 PROCEDURE — 91301 SARS-COV-2 / COVID-19 MRNA VACCINE (MODERNA) 100 MCG: CPT

## 2021-06-02 PROCEDURE — 0012A SARS-COV-2 / COVID-19 MRNA VACCINE (MODERNA) 100 MCG: CPT

## 2021-08-06 ENCOUNTER — ANNUAL EXAM (OUTPATIENT)
Dept: OBGYN CLINIC | Facility: CLINIC | Age: 31
End: 2021-08-06

## 2021-08-06 VITALS
SYSTOLIC BLOOD PRESSURE: 117 MMHG | BODY MASS INDEX: 27.89 KG/M2 | HEART RATE: 79 BPM | WEIGHT: 184 LBS | DIASTOLIC BLOOD PRESSURE: 75 MMHG | HEIGHT: 68 IN

## 2021-08-06 DIAGNOSIS — L73.1 INGROWN HAIR: ICD-10-CM

## 2021-08-06 DIAGNOSIS — Z01.419 ENCOUNTER FOR ANNUAL ROUTINE GYNECOLOGICAL EXAMINATION: Primary | ICD-10-CM

## 2021-08-06 PROCEDURE — 0503F POSTPARTUM CARE VISIT: CPT | Performed by: NURSE PRACTITIONER

## 2021-08-06 PROCEDURE — 1036F TOBACCO NON-USER: CPT | Performed by: NURSE PRACTITIONER

## 2021-08-06 PROCEDURE — S0612 ANNUAL GYNECOLOGICAL EXAMINA: HCPCS | Performed by: NURSE PRACTITIONER

## 2021-08-06 PROCEDURE — NC001 PR NO CHARGE: Performed by: NURSE PRACTITIONER

## 2021-08-06 PROCEDURE — 3008F BODY MASS INDEX DOCD: CPT | Performed by: NURSE PRACTITIONER

## 2021-08-06 NOTE — ASSESSMENT & PLAN NOTE
Pap smear not indicated, repeat in 2 years with co testing  Has mirena in place, would like to remove it in one year to try for another child  No GYN complaints, other than ingrown hair due to hair removal/shaving  No family history of breast or GYN cancer

## 2021-09-02 ENCOUNTER — OFFICE VISIT (OUTPATIENT)
Dept: FAMILY MEDICINE CLINIC | Facility: CLINIC | Age: 31
End: 2021-09-02
Payer: COMMERCIAL

## 2021-09-02 VITALS
BODY MASS INDEX: 28.04 KG/M2 | HEART RATE: 94 BPM | OXYGEN SATURATION: 99 % | TEMPERATURE: 97.1 F | HEIGHT: 68 IN | RESPIRATION RATE: 18 BRPM | SYSTOLIC BLOOD PRESSURE: 122 MMHG | DIASTOLIC BLOOD PRESSURE: 80 MMHG | WEIGHT: 185 LBS

## 2021-09-02 DIAGNOSIS — Z11.59 ENCOUNTER FOR HEPATITIS C SCREENING TEST FOR LOW RISK PATIENT: ICD-10-CM

## 2021-09-02 DIAGNOSIS — H65.192 OTHER NON-RECURRENT ACUTE NONSUPPURATIVE OTITIS MEDIA OF LEFT EAR: Primary | ICD-10-CM

## 2021-09-02 PROBLEM — H66.90 OTITIS MEDIA: Status: ACTIVE | Noted: 2021-09-02

## 2021-09-02 PROCEDURE — 3008F BODY MASS INDEX DOCD: CPT | Performed by: NURSE PRACTITIONER

## 2021-09-02 PROCEDURE — 99214 OFFICE O/P EST MOD 30 MIN: CPT | Performed by: NURSE PRACTITIONER

## 2021-09-02 RX ORDER — IBUPROFEN 800 MG/1
800 TABLET ORAL EVERY 8 HOURS PRN
Qty: 30 TABLET | Refills: 0 | Status: SHIPPED | OUTPATIENT
Start: 2021-09-02

## 2021-09-02 RX ORDER — AMOXICILLIN 500 MG/1
500 CAPSULE ORAL EVERY 8 HOURS SCHEDULED
Qty: 30 CAPSULE | Refills: 0 | Status: SHIPPED | OUTPATIENT
Start: 2021-09-02 | End: 2021-09-12

## 2021-09-02 NOTE — PROGRESS NOTES
Assessment/Plan:    Otitis media  Discussed with patient that treatment is for immediate pain control  Tylenol or NSAIDs have demonstrated better short term ear pain relief  Oral antibiotic is the mainstay of treatment but overuse can cause antibiotic resistance  Today I am starting on 10 day course of therapy and advised to use NSAIDs and Tylenol for pain management  I recommended if s/s do not resolve to return to office  Diagnoses and all orders for this visit:    Other non-recurrent acute nonsuppurative otitis media of left ear  -     amoxicillin (AMOXIL) 500 mg capsule; Take 1 capsule (500 mg total) by mouth every 8 (eight) hours for 10 days  -     ibuprofen (MOTRIN) 800 mg tablet; Take 1 tablet (800 mg total) by mouth every 8 (eight) hours as needed for mild pain or moderate pain    Encounter for hepatitis C screening test for low risk patient  -     Hepatitis C antibody; Future          Subjective:      Patient ID: Alton Herzog is a 27 y o  female  27year old female patient presents today for same day left ear pain that started this week per patient  Verbalized that her ear pain has been recurrent lately, her last ear infection was in March of last year  She reports having to see an ENT and was given ear drops at that time  Earache   There is pain in the left ear  This is a new problem  The current episode started in the past 7 days  The problem occurs constantly  The problem has been gradually worsening  There has been no fever  The fever has been present for less than 1 day  The pain is at a severity of 9/10  The pain is severe  Associated symptoms include headaches  Pertinent negatives include no coughing, ear discharge, hearing loss, rhinorrhea or sore throat  She has tried acetaminophen for the symptoms  The treatment provided mild relief  Her past medical history is significant for a chronic ear infection  There is no history of hearing loss or a tympanostomy tube         The following portions of the patient's history were reviewed and updated as appropriate: allergies, current medications, past family history, past medical history, past social history, past surgical history and problem list     Review of Systems   Constitutional: Negative  Negative for appetite change and fever  HENT: Positive for ear pain  Negative for ear discharge, hearing loss, rhinorrhea and sore throat  Eyes: Negative  Respiratory: Negative  Negative for cough, chest tightness, shortness of breath and wheezing  Cardiovascular: Negative  Negative for chest pain and palpitations  Gastrointestinal: Negative  Endocrine: Negative  Genitourinary: Negative  Musculoskeletal: Negative  Skin: Negative  Allergic/Immunologic: Negative  Neurological: Positive for headaches  Hematological: Negative  Psychiatric/Behavioral: Negative  Objective:      /80 (BP Location: Left arm, Patient Position: Sitting, Cuff Size: Standard)   Pulse 94   Temp (!) 97 1 °F (36 2 °C) (Temporal)   Resp 18   Ht 5' 8" (1 727 m)   Wt 83 9 kg (185 lb)   SpO2 99%   BMI 28 13 kg/m²          Physical Exam  Vitals and nursing note reviewed  Constitutional:       General: She is not in acute distress  Appearance: Normal appearance  She is not ill-appearing  HENT:      Head: Normocephalic and atraumatic  Right Ear: Hearing and external ear normal       Left Ear: Hearing, ear canal and external ear normal  Tenderness present  Tympanic membrane is injected, erythematous, retracted and bulging  Nose: Nose normal  No congestion or rhinorrhea  Mouth/Throat:      Mouth: Mucous membranes are moist       Pharynx: Oropharynx is clear  No oropharyngeal exudate or posterior oropharyngeal erythema  Eyes:      Extraocular Movements: Extraocular movements intact  Conjunctiva/sclera: Conjunctivae normal       Pupils: Pupils are equal, round, and reactive to light     Cardiovascular: Rate and Rhythm: Normal rate and regular rhythm  Pulses: Normal pulses  Heart sounds: Normal heart sounds  Pulmonary:      Effort: Pulmonary effort is normal  No respiratory distress  Breath sounds: Normal breath sounds  Musculoskeletal:         General: Normal range of motion  Cervical back: Normal range of motion and neck supple  Skin:     General: Skin is warm and dry  Capillary Refill: Capillary refill takes less than 2 seconds  Neurological:      General: No focal deficit present  Mental Status: She is alert and oriented to person, place, and time     Psychiatric:         Mood and Affect: Mood normal          Behavior: Behavior normal            Chief Complaint   Patient presents with    Earache

## 2021-09-02 NOTE — ASSESSMENT & PLAN NOTE
Discussed with patient that treatment is for immediate pain control  Tylenol or NSAIDs have demonstrated better short term ear pain relief  Oral antibiotic is the mainstay of treatment but overuse can cause antibiotic resistance  Today I am starting on 10 day course of therapy and advised to use NSAIDs and Tylenol for pain management  I recommended if s/s do not resolve to return to office

## 2021-10-22 ENCOUNTER — OFFICE VISIT (OUTPATIENT)
Dept: FAMILY MEDICINE CLINIC | Facility: CLINIC | Age: 31
End: 2021-10-22
Payer: COMMERCIAL

## 2021-10-22 VITALS
DIASTOLIC BLOOD PRESSURE: 80 MMHG | OXYGEN SATURATION: 99 % | BODY MASS INDEX: 27.89 KG/M2 | HEART RATE: 83 BPM | TEMPERATURE: 97.3 F | RESPIRATION RATE: 18 BRPM | WEIGHT: 184 LBS | HEIGHT: 68 IN | SYSTOLIC BLOOD PRESSURE: 110 MMHG

## 2021-10-22 DIAGNOSIS — E78.2 MIXED HYPERLIPIDEMIA: ICD-10-CM

## 2021-10-22 DIAGNOSIS — R53.83 OTHER FATIGUE: ICD-10-CM

## 2021-10-22 DIAGNOSIS — E66.3 OVERWEIGHT (BMI 25.0-29.9): ICD-10-CM

## 2021-10-22 DIAGNOSIS — Z00.00 ANNUAL PHYSICAL EXAM: Primary | ICD-10-CM

## 2021-10-22 DIAGNOSIS — R73.9 HYPERGLYCEMIA: ICD-10-CM

## 2021-10-22 PROCEDURE — 3008F BODY MASS INDEX DOCD: CPT | Performed by: NURSE PRACTITIONER

## 2021-10-22 PROCEDURE — 1036F TOBACCO NON-USER: CPT | Performed by: NURSE PRACTITIONER

## 2021-10-22 PROCEDURE — 99395 PREV VISIT EST AGE 18-39: CPT | Performed by: NURSE PRACTITIONER

## 2021-10-30 LAB
ALBUMIN SERPL-MCNC: 4.4 G/DL (ref 3.6–5.1)
ALBUMIN/GLOB SERPL: 1.4 (CALC) (ref 1–2.5)
ALP SERPL-CCNC: 80 U/L (ref 31–125)
ALT SERPL-CCNC: 8 U/L (ref 6–29)
AST SERPL-CCNC: 12 U/L (ref 10–30)
BASOPHILS # BLD AUTO: 29 CELLS/UL (ref 0–200)
BASOPHILS NFR BLD AUTO: 0.5 %
BILIRUB SERPL-MCNC: 0.4 MG/DL (ref 0.2–1.2)
BUN SERPL-MCNC: 8 MG/DL (ref 7–25)
BUN/CREAT SERPL: NORMAL (CALC) (ref 6–22)
CALCIUM SERPL-MCNC: 9.2 MG/DL (ref 8.6–10.2)
CHLORIDE SERPL-SCNC: 106 MMOL/L (ref 98–110)
CHOLEST SERPL-MCNC: 132 MG/DL
CHOLEST/HDLC SERPL: 2.9 (CALC)
CO2 SERPL-SCNC: 25 MMOL/L (ref 20–32)
CREAT SERPL-MCNC: 0.77 MG/DL (ref 0.5–1.1)
EOSINOPHIL # BLD AUTO: 41 CELLS/UL (ref 15–500)
EOSINOPHIL NFR BLD AUTO: 0.7 %
ERYTHROCYTE [DISTWIDTH] IN BLOOD BY AUTOMATED COUNT: 12 % (ref 11–15)
GLOBULIN SER CALC-MCNC: 3.2 G/DL (CALC) (ref 1.9–3.7)
GLUCOSE SERPL-MCNC: 85 MG/DL (ref 65–99)
HCT VFR BLD AUTO: 42.6 % (ref 35–45)
HDLC SERPL-MCNC: 46 MG/DL
HGB BLD-MCNC: 14.2 G/DL (ref 11.7–15.5)
LDLC SERPL CALC-MCNC: 77 MG/DL (CALC)
LYMPHOCYTES # BLD AUTO: 2158 CELLS/UL (ref 850–3900)
LYMPHOCYTES NFR BLD AUTO: 37.2 %
MCH RBC QN AUTO: 28.3 PG (ref 27–33)
MCHC RBC AUTO-ENTMCNC: 33.3 G/DL (ref 32–36)
MCV RBC AUTO: 84.9 FL (ref 80–100)
MONOCYTES # BLD AUTO: 296 CELLS/UL (ref 200–950)
MONOCYTES NFR BLD AUTO: 5.1 %
NEUTROPHILS # BLD AUTO: 3277 CELLS/UL (ref 1500–7800)
NEUTROPHILS NFR BLD AUTO: 56.5 %
NONHDLC SERPL-MCNC: 86 MG/DL (CALC)
PLATELET # BLD AUTO: 272 THOUSAND/UL (ref 140–400)
PMV BLD REES-ECKER: 10.8 FL (ref 7.5–12.5)
POTASSIUM SERPL-SCNC: 4.7 MMOL/L (ref 3.5–5.3)
PROT SERPL-MCNC: 7.6 G/DL (ref 6.1–8.1)
RBC # BLD AUTO: 5.02 MILLION/UL (ref 3.8–5.1)
SL AMB EGFR AFRICAN AMERICAN: 119 ML/MIN/1.73M2
SL AMB EGFR NON AFRICAN AMERICAN: 103 ML/MIN/1.73M2
SODIUM SERPL-SCNC: 137 MMOL/L (ref 135–146)
TRIGL SERPL-MCNC: 32 MG/DL
TSH SERPL-ACNC: 0.74 MIU/L
WBC # BLD AUTO: 5.8 THOUSAND/UL (ref 3.8–10.8)

## 2022-03-22 ENCOUNTER — OFFICE VISIT (OUTPATIENT)
Dept: OBGYN CLINIC | Facility: CLINIC | Age: 32
End: 2022-03-22

## 2022-03-22 VITALS
WEIGHT: 178 LBS | HEART RATE: 87 BPM | HEIGHT: 68 IN | DIASTOLIC BLOOD PRESSURE: 80 MMHG | SYSTOLIC BLOOD PRESSURE: 122 MMHG | BODY MASS INDEX: 26.98 KG/M2

## 2022-03-22 DIAGNOSIS — N90.89 VULVAR IRRITATION: Primary | ICD-10-CM

## 2022-03-22 PROCEDURE — 1036F TOBACCO NON-USER: CPT | Performed by: NURSE PRACTITIONER

## 2022-03-22 PROCEDURE — 3725F SCREEN DEPRESSION PERFORMED: CPT | Performed by: NURSE PRACTITIONER

## 2022-03-22 PROCEDURE — 3008F BODY MASS INDEX DOCD: CPT | Performed by: NURSE PRACTITIONER

## 2022-03-22 PROCEDURE — 99213 OFFICE O/P EST LOW 20 MIN: CPT | Performed by: NURSE PRACTITIONER

## 2022-03-22 NOTE — PATIENT INSTRUCTIONS
Call with needs or concerns  Return after 7/10/2022    COVID-19 Instructions    If you are having any of the following:  Cough   Shortness of breath   Fever  If traveled within past 2 weeks internationally or to high risk [place] states  Or been in contact with someone that has     Please call either:   Your PCP office  [phone], option [phone]    They will screen you over the phone and direct you to the nearest appropriate testing location  DO NOT go to your PCP or OB office without calling first     Thank you for your confidence in our team    We appreciate you and welcome your feedback  If you receive a survey from us, please take a few moments to let us know how we are doing     Sincerely,  BRITNEY Velazco

## 2022-03-22 NOTE — PROGRESS NOTES
Assessment/Plan:         Diagnoses and all orders for this visit:    Vulvar irritation      Plan  Call with needs or concerns  Return after 7/10/2022  Pt verbalized understanding of all discussed  Subjective:      Patient ID: Blas Hernandez is a 32 y o  female  HPI   Pt presents with C/O vulvar irritation and small bumps each time she shaves    Explained this can be normal with shaving, no irritation, small bumps at all hair follicles which look WNL     The following portions of the patient's history were reviewed and updated as appropriate: allergies, current medications, past family history, past medical history, past social history, past surgical history and problem list     Review of Systems      Pertinent items are note in the HPI    Objective:      /80   Pulse 87   Ht 5' 8" (1 727 m)   Wt 80 7 kg (178 lb)   BMI 27 06 kg/m²          Physical Exam  Vitals reviewed  Constitutional:       Appearance: Normal appearance  Eyes:      General:         Right eye: No discharge  Left eye: No discharge  Pulmonary:      Effort: Pulmonary effort is normal  No respiratory distress  Genitourinary:     General: Normal vulva  Vagina: No vaginal discharge  Musculoskeletal:      Cervical back: Normal range of motion  Skin:     General: Skin is warm and dry  Neurological:      Mental Status: She is alert and oriented to person, place, and time  Psychiatric:         Mood and Affect: Mood normal          Behavior: Behavior normal          Thought Content:  Thought content normal        Negative cough or SOB  Vulva negative lesions or erythema, tiny bumps at each hair follicles  Vagina normal mucosa, negative abnormal discharge  Cervix begative lesions

## 2022-08-12 ENCOUNTER — ANNUAL EXAM (OUTPATIENT)
Dept: OBGYN CLINIC | Facility: CLINIC | Age: 32
End: 2022-08-12

## 2022-08-12 ENCOUNTER — LAB (OUTPATIENT)
Dept: LAB | Facility: CLINIC | Age: 32
End: 2022-08-12
Payer: COMMERCIAL

## 2022-08-12 VITALS
HEART RATE: 67 BPM | SYSTOLIC BLOOD PRESSURE: 123 MMHG | HEIGHT: 68 IN | WEIGHT: 179 LBS | DIASTOLIC BLOOD PRESSURE: 81 MMHG | BODY MASS INDEX: 27.13 KG/M2

## 2022-08-12 DIAGNOSIS — R73.9 HYPERGLYCEMIA: ICD-10-CM

## 2022-08-12 DIAGNOSIS — Z00.00 WELL FEMALE EXAM WITHOUT GYNECOLOGICAL EXAM: ICD-10-CM

## 2022-08-12 DIAGNOSIS — Z11.3 SCREEN FOR STD (SEXUALLY TRANSMITTED DISEASE): Primary | ICD-10-CM

## 2022-08-12 DIAGNOSIS — Z11.3 SCREEN FOR STD (SEXUALLY TRANSMITTED DISEASE): ICD-10-CM

## 2022-08-12 DIAGNOSIS — E78.2 MIXED HYPERLIPIDEMIA: ICD-10-CM

## 2022-08-12 DIAGNOSIS — R53.83 OTHER FATIGUE: ICD-10-CM

## 2022-08-12 PROBLEM — A59.9 TRICHOMONIASIS: Status: RESOLVED | Noted: 2020-07-17 | Resolved: 2022-08-12

## 2022-08-12 LAB
ALBUMIN SERPL BCP-MCNC: 3.8 G/DL (ref 3.5–5)
ALP SERPL-CCNC: 81 U/L (ref 46–116)
ALT SERPL W P-5'-P-CCNC: 17 U/L (ref 12–78)
ANION GAP SERPL CALCULATED.3IONS-SCNC: 3 MMOL/L (ref 4–13)
AST SERPL W P-5'-P-CCNC: 15 U/L (ref 5–45)
BACTERIA UR QL AUTO: ABNORMAL /HPF
BASOPHILS # BLD AUTO: 0.02 THOUSANDS/ΜL (ref 0–0.1)
BASOPHILS NFR BLD AUTO: 0 % (ref 0–1)
BILIRUB SERPL-MCNC: 0.48 MG/DL (ref 0.2–1)
BILIRUB UR QL STRIP: NEGATIVE
BUN SERPL-MCNC: 6 MG/DL (ref 5–25)
CALCIUM SERPL-MCNC: 8.9 MG/DL (ref 8.3–10.1)
CHLORIDE SERPL-SCNC: 108 MMOL/L (ref 96–108)
CHOLEST SERPL-MCNC: 132 MG/DL
CLARITY UR: CLEAR
CO2 SERPL-SCNC: 26 MMOL/L (ref 21–32)
COLOR UR: ABNORMAL
CREAT SERPL-MCNC: 0.87 MG/DL (ref 0.6–1.3)
EOSINOPHIL # BLD AUTO: 0.04 THOUSAND/ΜL (ref 0–0.61)
EOSINOPHIL NFR BLD AUTO: 1 % (ref 0–6)
ERYTHROCYTE [DISTWIDTH] IN BLOOD BY AUTOMATED COUNT: 12.4 % (ref 11.6–15.1)
GFR SERPL CREATININE-BSD FRML MDRD: 89 ML/MIN/1.73SQ M
GLUCOSE P FAST SERPL-MCNC: 74 MG/DL (ref 65–99)
GLUCOSE UR STRIP-MCNC: NEGATIVE MG/DL
HCT VFR BLD AUTO: 43.1 % (ref 34.8–46.1)
HCV AB SER QL: NORMAL
HDLC SERPL-MCNC: 56 MG/DL
HGB BLD-MCNC: 13.9 G/DL (ref 11.5–15.4)
HGB UR QL STRIP.AUTO: NEGATIVE
IMM GRANULOCYTES # BLD AUTO: 0.01 THOUSAND/UL (ref 0–0.2)
IMM GRANULOCYTES NFR BLD AUTO: 0 % (ref 0–2)
KETONES UR STRIP-MCNC: NEGATIVE MG/DL
LDLC SERPL CALC-MCNC: 68 MG/DL (ref 0–100)
LEUKOCYTE ESTERASE UR QL STRIP: ABNORMAL
LYMPHOCYTES # BLD AUTO: 1.36 THOUSANDS/ΜL (ref 0.6–4.47)
LYMPHOCYTES NFR BLD AUTO: 27 % (ref 14–44)
MCH RBC QN AUTO: 28.4 PG (ref 26.8–34.3)
MCHC RBC AUTO-ENTMCNC: 32.3 G/DL (ref 31.4–37.4)
MCV RBC AUTO: 88 FL (ref 82–98)
MONOCYTES # BLD AUTO: 0.27 THOUSAND/ΜL (ref 0.17–1.22)
MONOCYTES NFR BLD AUTO: 5 % (ref 4–12)
NEUTROPHILS # BLD AUTO: 3.26 THOUSANDS/ΜL (ref 1.85–7.62)
NEUTS SEG NFR BLD AUTO: 67 % (ref 43–75)
NITRITE UR QL STRIP: NEGATIVE
NON-SQ EPI CELLS URNS QL MICRO: ABNORMAL /HPF
NONHDLC SERPL-MCNC: 76 MG/DL
NRBC BLD AUTO-RTO: 0 /100 WBCS
PH UR STRIP.AUTO: 5.5 [PH]
PLATELET # BLD AUTO: 227 THOUSANDS/UL (ref 149–390)
PMV BLD AUTO: 11.5 FL (ref 8.9–12.7)
POTASSIUM SERPL-SCNC: 4.3 MMOL/L (ref 3.5–5.3)
PROT SERPL-MCNC: 8.1 G/DL (ref 6.4–8.4)
PROT UR STRIP-MCNC: NEGATIVE MG/DL
RBC # BLD AUTO: 4.9 MILLION/UL (ref 3.81–5.12)
RBC #/AREA URNS AUTO: ABNORMAL /HPF
SODIUM SERPL-SCNC: 137 MMOL/L (ref 135–147)
SP GR UR STRIP.AUTO: 1.02 (ref 1–1.03)
TRIGL SERPL-MCNC: 38 MG/DL
TSH SERPL DL<=0.05 MIU/L-ACNC: 1.29 UIU/ML (ref 0.45–4.5)
UROBILINOGEN UR STRIP-ACNC: <2 MG/DL
WBC # BLD AUTO: 4.96 THOUSAND/UL (ref 4.31–10.16)
WBC #/AREA URNS AUTO: ABNORMAL /HPF

## 2022-08-12 PROCEDURE — 87389 HIV-1 AG W/HIV-1&-2 AB AG IA: CPT

## 2022-08-12 PROCEDURE — 84443 ASSAY THYROID STIM HORMONE: CPT

## 2022-08-12 PROCEDURE — 80053 COMPREHEN METABOLIC PANEL: CPT

## 2022-08-12 PROCEDURE — 85025 COMPLETE CBC W/AUTO DIFF WBC: CPT

## 2022-08-12 PROCEDURE — 87591 N.GONORRHOEAE DNA AMP PROB: CPT | Performed by: STUDENT IN AN ORGANIZED HEALTH CARE EDUCATION/TRAINING PROGRAM

## 2022-08-12 PROCEDURE — 87491 CHLMYD TRACH DNA AMP PROBE: CPT | Performed by: STUDENT IN AN ORGANIZED HEALTH CARE EDUCATION/TRAINING PROGRAM

## 2022-08-12 PROCEDURE — 86803 HEPATITIS C AB TEST: CPT

## 2022-08-12 PROCEDURE — 36415 COLL VENOUS BLD VENIPUNCTURE: CPT

## 2022-08-12 PROCEDURE — 80061 LIPID PANEL: CPT

## 2022-08-12 PROCEDURE — 99213 OFFICE O/P EST LOW 20 MIN: CPT | Performed by: NURSE PRACTITIONER

## 2022-08-12 PROCEDURE — 3725F SCREEN DEPRESSION PERFORMED: CPT | Performed by: NURSE PRACTITIONER

## 2022-08-12 PROCEDURE — 86592 SYPHILIS TEST NON-TREP QUAL: CPT

## 2022-08-12 NOTE — PROGRESS NOTES
ASSESSMENT & PLAN: Angelia Ambrose is a 32 y o  Vic Lacer with normal gynecologic exam     1   Routine well woman exam done today  2    Pap and HPV: Pap with HPV was not done today  Current ASCCP Guidelines reviewed  3   The patient accepted STD testing  chlamydia, gonorrhea and HIV testing performed  Safe sex practices have been discussed  Last STD testing was  due to pregnancy, was negative  4  The patient is not sexually active  She uses an IUD - Mirena with string visualized on pelvic exam today  5  The following were reviewed in today's visit: STD testing and HIV risk factors and prevention  6  PHQ-9 score was zero  7  Patient to return to office in 12 months for annual women's care  All questions have been answered to her satisfaction  CC:  Annual Gynecologic Examination    HPI: Angelia Ambrose is a 32 y o  Vic Lacer who presents for annual gynecologic examination  She has the following concerns:  None today  Health Maintenance:    She exercises 2 days per week  She feels safe at home  She does follow a balanced diet  She does not use tobacco    Past Medical History:   Diagnosis Date    Varicella      Past Surgical History:   Procedure Laterality Date    WISDOM TOOTH EXTRACTION       Past OB/Gyn History:   No LMP recorded (lmp unknown)  Patient has had an implant  History of sexually transmitted infection No  Patient is not currently sexually active: heterosexual  Birth control:IUD  Future fertility: is desired  Last Pap  22 :  no abnormalities;  HPV was not done  Repeat is due next year after 2023 because last one in  was just a Pap smear without HPV dna      OB History    Para Term  AB Living   1 1 1 0 0 1   SAB IAB Ectopic Multiple Live Births   0 0 0 0 1      # Outcome Date GA Lbr Aguila/2nd Weight Sex Delivery Anes PTL Lv   1 Term 19 38w3d  3345 g (7 lb 6 oz) F Vag-Spont EPI N WENDY      Name: Katherine Case Bash: 9 Apgar5: 9       Family History:  Family History   Problem Relation Age of Onset    Hypertension Mother    Campuzano Diabetes Father     Hypertension Sister        Social History:  Social History     Socioeconomic History    Marital status: Single     Spouse name: Not on file    Number of children: Not on file    Years of education: Not on file    Highest education level: Not on file   Occupational History    Not on file   Tobacco Use    Smoking status: Never Smoker    Smokeless tobacco: Never Used   Vaping Use    Vaping Use: Never used   Substance and Sexual Activity    Alcohol use: Yes     Comment: social    Drug use: No    Sexual activity: Not Currently     Partners: Male     Birth control/protection: I U D  Other Topics Concern    Not on file   Social History Narrative    Not on file     Social Determinants of Health     Financial Resource Strain: Low Risk     Difficulty of Paying Living Expenses: Not very hard   Food Insecurity: No Food Insecurity    Worried About Running Out of Food in the Last Year: Never true    Zohaib of Food in the Last Year: Never true   Transportation Needs: No Transportation Needs    Lack of Transportation (Medical): No    Lack of Transportation (Non-Medical): No   Physical Activity: Not on file   Stress: Not on file   Social Connections: Not on file   Intimate Partner Violence: Not on file   Housing Stability: Low Risk     Unable to Pay for Housing in the Last Year: No    Number of Places Lived in the Last Year: 1    Unstable Housing in the Last Year: No     Presently lives with Child and mom  Patient is single  Patient is currently employed       Allergies:  No Known Allergies    Medications:    Current Outpatient Medications:     ibuprofen (MOTRIN) 800 mg tablet, Take 1 tablet (800 mg total) by mouth every 8 (eight) hours as needed for mild pain or moderate pain, Disp: 30 tablet, Rfl: 0    levonorgestrel (MIRENA) 20 MCG/24HR IUD, 1 each by Intrauterine route once, Disp: , Rfl:     Review of Systems:  Denies fevers, chills, unintentional weight loss, excessive fatigue, chest pain, shortness of breath, abdominal pain, nausea, vomiting, urinary incontinence, urinary frequency, vaginal bleeding, vaginal discharge  All other systems negative unless otherwise stated  Physical Exam:  /81   Pulse 67   Ht 5' 8" (1 727 m)   Wt 81 2 kg (179 lb)   LMP  (LMP Unknown)   BMI 27 22 kg/m²  Body mass index is 27 22 kg/m²  GEN: The patient was alert and oriented x3, pleasant well-appearing female in no acute distress  HEENT:  Unremarkable, no anterior or posterior lymphadenopathy, no thyromegaly  CV:  Regular rate and rhythm, normal S1 and S2, no murmurs  RESP:  Clear to auscultation bilaterally, no wheezes, rales or rhonchi  BREAST:  Symmetric breasts with no palpable breast masses or obvious breast lesions  She has no retractions or nipple discharge  She has no axillary abnormalities or palpable masses  GI:  Soft, nontender, non-distended  MSK: bilateral lower extremities are nontender, no edema  : Normal appearing external female genitalia, normal appearing urethral meatus  On sterile speculum exam, normal appearing vaginal epithelium, minimal vaginal discharge, Mirena with string visualized; no bleeding, grossly normal appearing cervix  On bimanual exam, no cervical motion tenderness; uterus is smooth, mobile, nontender  No tenderness or fullness in the bilateral adnexa

## 2022-08-13 LAB
C TRACH DNA SPEC QL NAA+PROBE: NEGATIVE
N GONORRHOEA DNA SPEC QL NAA+PROBE: NEGATIVE

## 2022-08-14 LAB
HIV 1+2 AB+HIV1 P24 AG SERPL QL IA: NORMAL
RPR SER QL: NORMAL

## 2022-08-23 ENCOUNTER — OFFICE VISIT (OUTPATIENT)
Dept: URGENT CARE | Age: 32
End: 2022-08-23
Payer: COMMERCIAL

## 2022-08-23 VITALS
DIASTOLIC BLOOD PRESSURE: 78 MMHG | OXYGEN SATURATION: 99 % | SYSTOLIC BLOOD PRESSURE: 112 MMHG | HEART RATE: 68 BPM | TEMPERATURE: 97.6 F | RESPIRATION RATE: 18 BRPM

## 2022-08-23 DIAGNOSIS — H66.001 NON-RECURRENT ACUTE SUPPURATIVE OTITIS MEDIA OF RIGHT EAR WITHOUT SPONTANEOUS RUPTURE OF TYMPANIC MEMBRANE: ICD-10-CM

## 2022-08-23 DIAGNOSIS — H60.8X3 CHRONIC ECZEMATOUS OTITIS EXTERNA OF BOTH EARS: Primary | ICD-10-CM

## 2022-08-23 PROCEDURE — G0382 LEV 3 HOSP TYPE B ED VISIT: HCPCS

## 2022-08-23 RX ORDER — AMOXICILLIN 500 MG/1
500 CAPSULE ORAL EVERY 12 HOURS SCHEDULED
Qty: 20 CAPSULE | Refills: 0 | Status: SHIPPED | OUTPATIENT
Start: 2022-08-23 | End: 2022-09-02

## 2022-08-23 RX ORDER — FLUOCINOLONE ACETONIDE 0.11 MG/ML
5 OIL AURICULAR (OTIC) 2 TIMES DAILY
Qty: 20 ML | Refills: 0 | Status: SHIPPED | OUTPATIENT
Start: 2022-08-23

## 2022-08-23 NOTE — PROGRESS NOTES
[facility]        NAME: Larisa Lee is a 32 y o  female  : 1990    MRN: [id number]  DATE: 2022  TIME: 6:30 PM    Assessment and Plan   Chronic eczematous otitis externa of both ears [H60 8X3]  1  Chronic eczematous otitis externa of both ears  fluocinolone acetonide (DERMOTIC) 0 01 % otic oil   2  Non-recurrent acute suppurative otitis media of right ear without spontaneous rupture of tympanic membrane  amoxicillin (AMOXIL) 500 mg capsule     Patient presents with complaints of right ear pain that is worsening and itchy bilateral ears  She sees ENT for chronic eczematous OE and is almost out of her medication  ENT couldn't see until end   Has been using drops to right ear however pain is worsening  Assessment notes suppurative right OM and left ear canal irritation that correlates to her chronic condition  Will order AMox and dermotic  F/U with ENT as scheduled  Patient Instructions       Follow up with ENT as scheduled  Chief Complaint     Chief Complaint   Patient presents with    Earache     Right ear pain since Wednesday of last week  Starting to feel left ear becoming painful  Denies cold symptoms  History of Present Illness       Patient presents with complaints of right ear pain that is worsening and itchy bilateral ears  She sees ENT for chronic eczematous OE and is almost out of her medication  ENT couldn't see until end   Has been using drops to right ear however pain is worsening  Assessment notes suppurative right OM and left ear canal irritation that correlates to her chronic condition  Will order AMox and dermotic  F/U with ENT as scheduled  Earache   Pertinent negatives include no abdominal pain, coughing, diarrhea, headaches, rash, sore throat or vomiting  Review of Systems   Review of Systems   Constitutional: Negative for chills and fever  HENT: Positive for ear pain   Negative for congestion, postnasal drip, sinus pain and sore throat  Eyes: Negative for pain and itching  Respiratory: Negative for cough, shortness of breath and wheezing  Cardiovascular: Negative for chest pain and palpitations  Gastrointestinal: Negative for abdominal pain, constipation, diarrhea, nausea and vomiting  Genitourinary: Negative for difficulty urinating and hematuria  Musculoskeletal: Negative for arthralgias and myalgias  Skin: Negative for rash  Neurological: Negative for dizziness, light-headedness and headaches  Psychiatric/Behavioral: Negative for agitation and sleep disturbance  The patient is not nervous/anxious  Current Medications       Current Outpatient Medications:     amoxicillin (AMOXIL) 500 mg capsule, Take 1 capsule (500 mg total) by mouth every 12 (twelve) hours for 10 days, Disp: 20 capsule, Rfl: 0    fluocinolone acetonide (DERMOTIC) 0 01 % otic oil, Administer 5 drops into the left ear 2 (two) times a day, Disp: 20 mL, Rfl: 0    levonorgestrel (MIRENA) 20 MCG/24HR IUD, 1 each by Intrauterine route once, Disp: , Rfl:     ibuprofen (MOTRIN) 800 mg tablet, Take 1 tablet (800 mg total) by mouth every 8 (eight) hours as needed for mild pain or moderate pain (Patient not taking: Reported on 8/23/2022), Disp: 30 tablet, Rfl: 0    Current Allergies     Allergies as of 08/23/2022    (No Known Allergies)            The following portions of the patient's history were reviewed and updated as appropriate: allergies, current medications, past family history, past medical history, past social history, past surgical history and problem list      Past Medical History:   Diagnosis Date    Varicella        Past Surgical History:   Procedure Laterality Date    WISDOM TOOTH EXTRACTION         Family History   Problem Relation Age of Onset    Hypertension Mother     Diabetes Father     Hypertension Sister          Medications have been verified          Objective   /78   Pulse 68 Temp 97 6 °F (36 4 °C)   Resp 18   LMP  (LMP Unknown)   SpO2 99%   No LMP recorded (lmp unknown)  Patient has had an implant  Physical Exam     Physical Exam  Vitals reviewed  Constitutional:       General: She is not in acute distress  Appearance: Normal appearance  She is not ill-appearing  HENT:      Head: Normocephalic and atraumatic  Right Ear: Tympanic membrane is erythematous and bulging  Left Ear: Swelling present  Eyes:      Extraocular Movements: Extraocular movements intact  Conjunctiva/sclera: Conjunctivae normal    Pulmonary:      Effort: Pulmonary effort is normal    Skin:     General: Skin is warm  Neurological:      General: No focal deficit present  Mental Status: She is alert     Psychiatric:         Mood and Affect: Mood normal          Behavior: Behavior normal          Judgment: Judgment normal

## 2022-11-11 ENCOUNTER — OFFICE VISIT (OUTPATIENT)
Dept: FAMILY MEDICINE CLINIC | Facility: CLINIC | Age: 32
End: 2022-11-11

## 2022-11-11 VITALS
WEIGHT: 188.4 LBS | TEMPERATURE: 98.7 F | RESPIRATION RATE: 20 BRPM | HEIGHT: 68 IN | OXYGEN SATURATION: 98 % | DIASTOLIC BLOOD PRESSURE: 70 MMHG | SYSTOLIC BLOOD PRESSURE: 115 MMHG | BODY MASS INDEX: 28.55 KG/M2 | HEART RATE: 88 BPM

## 2022-11-11 DIAGNOSIS — Z00.00 ANNUAL PHYSICAL EXAM: Primary | ICD-10-CM

## 2022-11-11 DIAGNOSIS — E66.3 OVERWEIGHT (BMI 25.0-29.9): ICD-10-CM

## 2022-11-11 DIAGNOSIS — Z23 NEED FOR INFLUENZA VACCINATION: ICD-10-CM

## 2022-11-11 PROBLEM — Z11.59 ENCOUNTER FOR HEPATITIS C SCREENING TEST FOR LOW RISK PATIENT: Status: RESOLVED | Noted: 2020-07-10 | Resolved: 2022-11-11

## 2022-11-11 PROBLEM — B37.9 YEAST INFECTION: Status: RESOLVED | Noted: 2020-08-24 | Resolved: 2022-11-11

## 2022-11-11 PROBLEM — H60.549 ECZEMATOID OTITIS EXTERNA: Status: ACTIVE | Noted: 2022-11-11

## 2022-11-11 PROBLEM — R73.9 HYPERGLYCEMIA: Status: RESOLVED | Noted: 2020-10-21 | Resolved: 2022-11-11

## 2022-11-11 PROBLEM — D69.6 THROMBOCYTOPENIA (HCC): Status: RESOLVED | Noted: 2021-04-28 | Resolved: 2022-11-11

## 2022-11-11 PROBLEM — E87.1 HYPONATREMIA: Status: RESOLVED | Noted: 2021-04-28 | Resolved: 2022-11-11

## 2022-11-11 PROBLEM — B54 MALARIA: Status: RESOLVED | Noted: 2021-04-28 | Resolved: 2022-11-11

## 2022-11-11 PROBLEM — R53.83 OTHER FATIGUE: Status: RESOLVED | Noted: 2020-10-21 | Resolved: 2022-11-11

## 2022-11-11 PROBLEM — H66.90 OTITIS MEDIA: Status: RESOLVED | Noted: 2021-09-02 | Resolved: 2022-11-11

## 2022-11-11 PROBLEM — B34.9 VIRAL INFECTION, UNSPECIFIED: Status: RESOLVED | Noted: 2021-04-26 | Resolved: 2022-11-11

## 2022-11-11 NOTE — PROGRESS NOTES
[address] PRIMARY CARE [facility]    NAME: Santhosh Phelps  AGE: 28 y o  SEX: female  : 1990     DATE: 2022     Assessment and Plan:     Problem List Items Addressed This Visit        Other    Overweight (BMI 25 0-29  9)     BMI Counseling: Body mass index is 28 65 kg/m²  The BMI is above normal  Nutrition recommendations include reducing portion sizes and 3-5 servings of fruits/vegetables daily  Exercise recommendations include exercising 3-5 times per week  Other Visit Diagnoses     Annual physical exam    -  Primary    Need for influenza vaccination        Relevant Orders    influenza vaccine, quadrivalent, 0 5 mL, preservative-free, for adult and pediatric patients 6 mos+ (AFLURIA, FLUARIX, FLULAVAL, FLUZONE)          Immunizations and preventive care screenings were discussed with patient today  Appropriate education was printed on patient's after visit summary  Counseling:  Alcohol/drug use: discussed moderation in alcohol intake, the recommendations for healthy alcohol use, and avoidance of illicit drug use  Dental Health: discussed importance of regular tooth brushing, flossing, and dental visits  Injury prevention: discussed safety/seat belts, safety helmets, smoke detectors, carbon dioxide detectors, and smoking near bedding or upholstery  Sexual health: discussed sexually transmitted diseases, partner selection, use of condoms, avoidance of unintended pregnancy, and contraceptive alternatives  · Exercise: the importance of regular exercise/physical activity was discussed  Recommend exercise 3-5 times per week for at least 30 minutes  Return in about 1 year (around 2023)       Chief Complaint:     Chief Complaint   Patient presents with   • Physical Exam      History of Present Illness:     Adult Annual Physical   Patient here for a comprehensive physical exam  The patient reports no problems  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 [employer]  She is not currently sexually active but plans to have another child  She does intermittent fasting as she is trying to lose weight with a goal of 176lb  She has a rowing machine in her basement  Diet and Physical Activity  · Diet/Nutrition: well balanced diet  12-8pm only eats, intermittent fasting    · Exercise: 3-4 times a week on average  3x a week rowing machine     Depression Screening  PHQ-2/9 Depression Screening         General Health  · Sleep: sleeps well  · Hearing: normal - bilateral   · Vision: wears glasses  · Dental: regular dental visits  /GYN Health  · Last menstrual period: none since IUD   · Contraceptive method: IUD placement  Since 2019  · History of STDs?: no      Review of Systems:     Review of Systems   Constitutional: Negative for chills and fever  HENT: Negative for ear pain and sore throat  Eyes: Negative for pain and visual disturbance  Respiratory: Negative for cough and shortness of breath  Cardiovascular: Negative for chest pain and palpitations  Gastrointestinal: Negative for abdominal pain, diarrhea, nausea and vomiting  Genitourinary: Negative for dysuria and hematuria  Musculoskeletal: Negative for arthralgias and back pain  Skin: Negative for color change and rash  Neurological: Negative for syncope and headaches  All other systems reviewed and are negative       Past Medical History:     Past Medical History:   Diagnosis Date   • Malaria 4/28/2021   • Varicella       Past Surgical History:     Past Surgical History:   Procedure Laterality Date   • WISDOM TOOTH EXTRACTION        Social History:     Social History     Socioeconomic History   • Marital status: Single     Spouse name: None   • Number of children: None   • Years of education: None   • Highest education level: None   Occupational History   • None   Tobacco Use   • Smoking status: Never Smoker   • Smokeless tobacco: Never Used   Vaping Use   • Vaping Use: Never used   Substance and Sexual Activity   • Alcohol use: Yes     Comment: social   • Drug use: No   • Sexual activity: Not Currently     Partners: Male     Birth control/protection: I U D  Other Topics Concern   • None   Social History Narrative   • None     Social Determinants of Health     Financial Resource Strain: Low Risk    • Difficulty of Paying Living Expenses: Not very hard   Food Insecurity: No Food Insecurity   • Worried About Running Out of Food in the Last Year: Never true   • Ran Out of Food in the Last Year: Never true   Transportation Needs: No Transportation Needs   • Lack of Transportation (Medical): No   • Lack of Transportation (Non-Medical): No   Physical Activity: Not on file   Stress: Not on file   Social Connections: Not on file   Intimate Partner Violence: Not on file   Housing Stability: Low Risk    • Unable to Pay for Housing in the Last Year: No   • Number of Places Lived in the Last Year: 1   • Unstable Housing in the Last Year: No      Family History:     Family History   Problem Relation Age of Onset   • Hypertension Mother    • Diabetes Father    • Hypertension Sister       Current Medications:     Current Outpatient Medications   Medication Sig Dispense Refill   • fluocinolone acetonide (DERMOTIC) 0 01 % otic oil Administer 5 drops into the left ear 2 (two) times a day 20 mL 0   • levonorgestrel (MIRENA) 20 MCG/24HR IUD 1 each by Intrauterine route once       No current facility-administered medications for this visit  Allergies:     No Known Allergies   Physical Exam:     /70 (BP Location: Left arm, Patient Position: Sitting, Cuff Size: Standard)   Pulse 88   Temp 98 7 °F (37 1 °C) (Tympanic)   Resp 20   Ht 5' 8" (1 727 m)   Wt 85 5 kg (188 lb 6 4 oz)   SpO2 98%   BMI 28 65 kg/m²     Physical Exam  Vitals and nursing note reviewed  Constitutional:       General: She is not in acute distress       Appearance: She is well-developed  HENT:      Head: Normocephalic and atraumatic  Right Ear: Tympanic membrane, ear canal and external ear normal       Left Ear: Tympanic membrane, ear canal and external ear normal    Eyes:      Extraocular Movements: Extraocular movements intact  Conjunctiva/sclera: Conjunctivae normal       Pupils: Pupils are equal, round, and reactive to light  Cardiovascular:      Rate and Rhythm: Normal rate and regular rhythm  Heart sounds: No murmur heard  Pulmonary:      Effort: Pulmonary effort is normal  No respiratory distress  Breath sounds: Normal breath sounds  Abdominal:      Palpations: Abdomen is soft  Tenderness: There is no abdominal tenderness  Musculoskeletal:      Cervical back: Neck supple  Skin:     General: Skin is warm and dry  Neurological:      Mental Status: She is alert and oriented to person, place, and time  Mental status is at baseline     Psychiatric:         Behavior: Behavior normal           Michael Merino PA-C   [address]

## 2022-11-11 NOTE — ASSESSMENT & PLAN NOTE
BMI Counseling: Body mass index is 28 65 kg/m²  The BMI is above normal  Nutrition recommendations include reducing portion sizes and 3-5 servings of fruits/vegetables daily  Exercise recommendations include exercising 3-5 times per week

## 2022-12-03 ENCOUNTER — AMB VIDEO VISIT (OUTPATIENT)
Dept: OTHER | Facility: HOSPITAL | Age: 32
End: 2022-12-03

## 2022-12-03 DIAGNOSIS — B37.9 YEAST INFECTION: Primary | ICD-10-CM

## 2022-12-03 RX ORDER — FLUCONAZOLE 150 MG/1
150 TABLET ORAL ONCE
Qty: 1 TABLET | Refills: 0 | Status: SHIPPED | OUTPATIENT
Start: 2022-12-03 | End: 2022-12-03

## 2022-12-03 NOTE — CARE ANYWHERE EVISITS
Visit Summary for Norah Berg - Gender: Female - Date of Birth: [id number]  Date: [id number] - Duration: 2 minutes  Patient: Norah Berg  Provider: Jewel Reyna PA-C    Patient Contact Information  Address  [address]; [address]  [phone]    Visit Topics  I feel a burning sensation inside my vagina, itching and redness on the vulva and slight pain during urination  These are symptoms of yeast infection i've had in the past [Added By: Self - 2022-12-03]    Triage Questions   What is your current physical address in the event of a medical emergency? Answer []  Are you allergic to any medications? Answer []  Are you now or could you be pregnant? Answer []  Do you have any immune system compromise or chronic lung   disease? Answer []  Do you have any vulnerable family members in the home (infant, pregnant, cancer, elderly)? Answer []     Conversation Transcripts  [0A][0A] [Notification] You are connected with Jewel Reyna PA-C, Urgent Care Specialist [0A][Notification] Norah Berg is located in [place]  [0A][Notification] Norah Berg has shared health history  Myah Cee  [0A]    Diagnosis  Candidiasis, unspecified    Procedures  Value: 47231 Code: CPT-4 UNLISTED E&M SERVICE    Medications Prescribed    No prescriptions ordered    Electronically signed by: Vee Reis(NPI [id number])

## 2022-12-03 NOTE — PATIENT INSTRUCTIONS
Yeast Infection   WHAT YOU NEED TO KNOW:   A yeast infection, or vaginal candidiasis, is a common vaginal infection  A yeast infection is caused by a fungus, or yeast-like germ  Fungi are normally found in your vagina  Too many fungi can cause an infection  DISCHARGE INSTRUCTIONS:   Call your doctor or gynecologist if:   You have a fever and chills  You develop abdominal or pelvic pain  Your discharge is bloody and it is not your monthly period  Your signs and symptoms get worse, even after treatment  You have questions or concerns about your condition or care  Medicines:   Medicines  help treat the fungal infection and decrease inflammation  The medicine may be a pill, cream, ointment, or vaginal tablet or suppository  Take your medicine as directed  Contact your healthcare provider if you think your medicine is not helping or if you have side effects  Tell him of her if you are allergic to any medicine  Keep a list of the medicines, vitamins, and herbs you take  Include the amounts, and when and why you take them  Bring the list or the pill bottles to follow-up visits  Carry your medicine list with you in case of an emergency  Keep your vagina healthy:   Clean your genital area with mild soap and warm water each day  Do not get soap inside your vagina  Gently dry the area after washing  Do not use hot tubs  The heat and moisture from hot tubs can increase your risk for another yeast infection  Always wipe from front to back  after you use the toilet  This prevents spreading bacteria from your rectal area into your vagina  Do not wear tight-fitting clothes or undergarments  for long periods of time  Wear cotton underwear during the day  Cotton helps keep your genital area dry and does not hold in warmth or moisture  Do not wear underwear at night  Do not douche  or use feminine hygiene sprays or bubble bath   Do not use pads or tampons that are scented, or colored or perfumed toilet paper  Do not have sex until your symptoms go away  Have your partner wear a condom until you complete your course of medication  Ask your healthcare provider about birth control options if necessary  Condoms have latex and diaphragms have gel that kills sperm  Both of these may irritate your genital area  Follow up with your doctor or gynecologist as directed:  Write down your questions so you remember to ask them during your visits  © Copyright [employer] 2022 Information is for End User's use only and may not be sold, redistributed or otherwise used for commercial purposes  All illustrations and images included in CareNotes® are the copyrighted property of [employer]  or [facility] Cindi Ward   The above information is an  only  It is not intended as medical advice for individual conditions or treatments  Talk to your doctor, nurse or pharmacist before following any medical regimen to see if it is safe and effective for you

## 2022-12-03 NOTE — PROGRESS NOTES
Video Visit - Laurence Mendoza 28 y o  female MRN: [id number]    REQUIRED DOCUMENTATION:         1  This service was provided via [facility]  2  Provider located at [address] [place] [id number] [phone]  3  [facility] provider: Kely Alejandra PA-C   4  Identify all parties in room with patient during [facility] visit:  No one else  5  After connecting through [employer], patient was identified by name and date of birth  Patient was then informed that this was a Telemedicine visit and that the exam was being conducted confidentially over secure lines  My office door was closed  No one else was in the room  Patient acknowledged consent and understanding of privacy and security of the Telemedicine visit  I informed the patient that I have reviewed their record in Epic and presented the opportunity for them to ask any questions regarding the visit today  The patient agreed to participate  HPI  Patient thinks she has a yeast infection  The past two weeks she is having a burning sensation inside the vagina  She is having some itching and burning on the outside as well  When she urinates it burns on her vagina  She denies any odor or discharge  She has had yeast infections in the past and this seems similar    She denies pregnancy and has IUD  Physical Exam  Constitutional:       General: She is not in acute distress  Appearance: Normal appearance  She is not ill-appearing, toxic-appearing or diaphoretic  Pulmonary:      Effort: Pulmonary effort is normal  No respiratory distress  Genitourinary:     Comments: deferred due to video visit  Skin:     General: Skin is dry  Neurological:      General: No focal deficit present  Mental Status: She is alert and oriented to person, place, and time     Psychiatric:         Mood and Affect: Mood normal          Behavior: Behavior normal          Diagnoses and all orders for this visit:    Yeast infection  - fluconazole (DIFLUCAN) 150 mg tablet; Take 1 tablet (150 mg total) by mouth once for 1 dose    start diflucan as directed,  Follow up with OBGYN on Monday  Patient Instructions   Yeast Infection   WHAT YOU NEED TO KNOW:   A yeast infection, or vaginal candidiasis, is a common vaginal infection  A yeast infection is caused by a fungus, or yeast-like germ  Fungi are normally found in your vagina  Too many fungi can cause an infection  DISCHARGE INSTRUCTIONS:   Call your doctor or gynecologist if:   · You have a fever and chills  · You develop abdominal or pelvic pain  · Your discharge is bloody and it is not your monthly period  · Your signs and symptoms get worse, even after treatment  · You have questions or concerns about your condition or care  Medicines:   · Medicines  help treat the fungal infection and decrease inflammation  The medicine may be a pill, cream, ointment, or vaginal tablet or suppository  · Take your medicine as directed  Contact your healthcare provider if you think your medicine is not helping or if you have side effects  Tell him of her if you are allergic to any medicine  Keep a list of the medicines, vitamins, and herbs you take  Include the amounts, and when and why you take them  Bring the list or the pill bottles to follow-up visits  Carry your medicine list with you in case of an emergency  Keep your vagina healthy:   · Clean your genital area with mild soap and warm water each day  Do not get soap inside your vagina  Gently dry the area after washing  Do not use hot tubs  The heat and moisture from hot tubs can increase your risk for another yeast infection  · Always wipe from front to back  after you use the toilet  This prevents spreading bacteria from your rectal area into your vagina  · Do not wear tight-fitting clothes or undergarments  for long periods of time  Wear cotton underwear during the day   Cotton helps keep your genital area dry and does not hold in warmth or moisture  Do not wear underwear at night  · Do not douche  or use feminine hygiene sprays or bubble bath  Do not use pads or tampons that are scented, or colored or perfumed toilet paper  · Do not have sex until your symptoms go away  Have your partner wear a condom until you complete your course of medication  · Ask your healthcare provider about birth control options if necessary  Condoms have latex and diaphragms have gel that kills sperm  Both of these may irritate your genital area  Follow up with your doctor or gynecologist as directed:  Write down your questions so you remember to ask them during your visits  © [employer] 2022 Information is for End User's use only and may not be sold, redistributed or otherwise used for commercial purposes  All illustrations and images included in CareNotes® are the copyrighted property of [employer]  or [employer]o [employer]   The above information is an  only  It is not intended as medical advice for individual conditions or treatments  Talk to your doctor, nurse or pharmacist before following any medical regimen to see if it is safe and effective for you

## 2023-02-15 ENCOUNTER — NURSE TRIAGE (OUTPATIENT)
Dept: OTHER | Facility: OTHER | Age: 33
End: 2023-02-15

## 2023-02-15 NOTE — TELEPHONE ENCOUNTER
Reason for Disposition  • [1] After 3 days AND [2] pain not improved    Answer Assessment - Initial Assessment Questions  1  MECHANISM: "How did the injury happen?" (e g , twisting injury, direct blow)       Overturned left knee while getting daughter out of car seat     2  ONSET: "When did the injury happen?" (Minutes or hours ago)       Last week     3  LOCATION: "Where is the injury located?"       Left knee     4  APPEARANCE of INJURY: "What does the injury look like?"       Looks normal     5  SEVERITY: "Can you put weight on that leg?" "Can you walk?"       Yes, but it hurts    6  SIZE: For cuts, bruises, or swelling, ask: "How large is it?" (e g , inches or centimeters;  entire joint)      N/A    7  PAIN: "Is there pain?" If Yes, ask: "How bad is the pain?"  "What does it keep you from doing?" (e g , Scale 1-10; or mild, moderate, severe)    -  NONE: (0): no pain    -  MILD (1-3): doesn't interfere with normal activities     -  MODERATE (4-7): interferes with normal activities (e g , work or school) or awakens from sleep, limping     -  SEVERE (8-10): excruciating pain, unable to do any normal activities, unable to walk      Moderate     8  TETANUS: For any breaks in the skin, ask: "When was the last tetanus booster?"     N/A    9  OTHER SYMPTOMS: "Do you have any other symptoms?"  (e g , "pop" when knee injured, swelling, locking, buckling)      Denies    10   PREGNANCY: "Is there any chance you are pregnant?" "When was your last menstrual period?"        Denies    Protocols used: KNEE INJURY-ADULT-

## 2023-02-17 ENCOUNTER — OFFICE VISIT (OUTPATIENT)
Dept: FAMILY MEDICINE CLINIC | Facility: CLINIC | Age: 33
End: 2023-02-17

## 2023-02-17 VITALS
HEART RATE: 86 BPM | TEMPERATURE: 98.3 F | BODY MASS INDEX: 29.25 KG/M2 | SYSTOLIC BLOOD PRESSURE: 122 MMHG | OXYGEN SATURATION: 98 % | HEIGHT: 68 IN | WEIGHT: 193 LBS | DIASTOLIC BLOOD PRESSURE: 78 MMHG

## 2023-02-17 DIAGNOSIS — M25.562 ACUTE PAIN OF LEFT KNEE: Primary | ICD-10-CM

## 2023-02-17 DIAGNOSIS — E66.3 OVERWEIGHT (BMI 25.0-29.9): ICD-10-CM

## 2023-02-17 RX ORDER — NAPROXEN 500 MG/1
500 TABLET ORAL 2 TIMES DAILY WITH MEALS
Qty: 20 TABLET | Refills: 0 | Status: SHIPPED | OUTPATIENT
Start: 2023-02-17

## 2023-02-17 NOTE — PROGRESS NOTES
Name: Je Mccullough      : 1990      MRN: [id number]  Encounter Provider: Master Tello PA-C  Encounter Date: 2023   Encounter department: [facility]     1  Acute pain of left knee  Comments:  Suspect MCL sprain or meniscus injury with medial knee pain x 1 week status post twisting injury  Start naproxen twice daily and home exercises  Consider PT  Orders:  -     naproxen (Naprosyn) 500 mg tablet; Take 1 tablet (500 mg total) by mouth 2 (two) times a day with meals    2  Overweight (BMI 25 0-29  9)  Assessment & Plan:  5 pound weight gain since last visit  Recommend diet and exercise  Janice Zelaya is a 28 y o  female with no significant PMH who presents with left knee pain medial aspect x1 week  She injured it about a week ago while she was taking her daughter out of the car from her car seat she felt her knee twist and heard a click  That night she did not have much pain but she woke up the next morning with pain and discomfort  No significant swelling  She has been using ice and a compression sleeve for her knee that she bought off of [employer]  Some gradual improvement  Still with pain with walking she was weightbearing and did not fall immediately after injury  No history of knee pain in the past   She has been taking Tylenol with minimal improvement  She does not take Tylenol every day  No numbness or tingling down the left leg  No lower leg swelling  Review of Systems   Constitutional: Negative for fever and unexpected weight change  Respiratory: Negative for shortness of breath  Cardiovascular: Negative for chest pain  Musculoskeletal: Positive for arthralgias  Negative for back pain, gait problem and joint swelling  Neurological: Negative for weakness and numbness         Current Outpatient Medications on File Prior to Visit   Medication Sig   • fluocinolone acetonide (DERMOTIC) 0 01 % otic oil Administer 5 drops into the left ear 2 (two) times a day   • levonorgestrel (MIRENA) 20 MCG/24HR IUD 1 each by Intrauterine route once       Objective     /78 (BP Location: Left arm, Patient Position: Sitting, Cuff Size: Adult)   Pulse 86   Temp 98 3 °F (36 8 °C) (Tympanic)   Ht 5' 8" (1 727 m)   Wt 87 5 kg (193 lb)   SpO2 98%   BMI 29 35 kg/m²     Physical Exam  Vitals and nursing note reviewed  Constitutional:       Appearance: Normal appearance  HENT:      Head: Normocephalic and atraumatic  Mouth/Throat:      Mouth: Mucous membranes are moist    Eyes:      Extraocular Movements: Extraocular movements intact  Pupils: Pupils are equal, round, and reactive to light  Cardiovascular:      Rate and Rhythm: Normal rate and regular rhythm  Pulses: Normal pulses  Heart sounds: Normal heart sounds  Pulmonary:      Effort: Pulmonary effort is normal       Breath sounds: Normal breath sounds  Musculoskeletal:      Right knee: Normal       Left knee: No bony tenderness or crepitus  Normal range of motion  Tenderness present over the medial joint line  No LCL laxity, MCL laxity, ACL laxity or PCL laxity  Normal alignment, normal meniscus and normal patellar mobility  Normal pulse  Instability Tests: Anterior drawer test negative  Posterior drawer test negative  Anterior Lachman test negative  Medial Catarino test positive and lateral Catarino test positive  Neurological:      Mental Status: She is alert and oriented to person, place, and time  Mental status is at baseline         Akin Oliveira PA-C

## 2023-03-15 ENCOUNTER — OFFICE VISIT (OUTPATIENT)
Dept: FAMILY MEDICINE CLINIC | Facility: CLINIC | Age: 33
End: 2023-03-15

## 2023-03-15 VITALS
OXYGEN SATURATION: 100 % | HEIGHT: 68 IN | DIASTOLIC BLOOD PRESSURE: 70 MMHG | TEMPERATURE: 98 F | WEIGHT: 192 LBS | RESPIRATION RATE: 16 BRPM | BODY MASS INDEX: 29.1 KG/M2 | SYSTOLIC BLOOD PRESSURE: 102 MMHG | HEART RATE: 72 BPM

## 2023-03-15 DIAGNOSIS — E66.3 OVERWEIGHT (BMI 25.0-29.9): ICD-10-CM

## 2023-03-15 DIAGNOSIS — M25.562 ACUTE PAIN OF LEFT KNEE: Primary | ICD-10-CM

## 2023-03-15 RX ORDER — NAPROXEN 500 MG/1
500 TABLET ORAL 2 TIMES DAILY WITH MEALS
Qty: 30 TABLET | Refills: 1 | Status: SHIPPED | OUTPATIENT
Start: 2023-03-15

## 2023-03-15 NOTE — PROGRESS NOTES
Name: Trudy Diamond      : 1990      MRN: [id number]  Encounter Provider: Radha Salazar PA-C  Encounter Date: 3/15/2023   Encounter department: [facility]     1  Acute pain of left knee  Comments:  Suspect MCL sprain with medial knee pain x 1 month status post twisting injury  Improved with naproxen twice daily and home exercises  Ordered PT  Orders:  -     Ambulatory Referral to Physical Therapy; Future  -     naproxen (Naprosyn) 500 mg tablet; Take 1 tablet (500 mg total) by mouth 2 (two) times a day with meals    2  Overweight (BMI 25 0-29  9)  Assessment & Plan:  1 lb weight loss since last visit  Recommend continue diet/exercise  Angel Wilburn is a 28 y o  female with no significant PMH who presents for 1 month follow-up of L knee pain  Gradually improving  When taking naproxen, she had no pain  She ran out about a week ago and pain came back and difficulty with doing home exercises  L knee pain initially started with twisting injury of taking daughter out of car seat  No new injury or event  No swelling or redness  Pain is same medial aspect  Weight bearing and walking normally  No issues with taking naproxen  Review of Systems   Constitutional: Negative for fever and unexpected weight change  Respiratory: Negative for shortness of breath  Cardiovascular: Negative for chest pain  Musculoskeletal: Positive for arthralgias (L knee)         Current Outpatient Medications on File Prior to Visit   Medication Sig   • fluocinolone acetonide (DERMOTIC) 0 01 % otic oil Administer 5 drops into the left ear 2 (two) times a day   • levonorgestrel (MIRENA) 20 MCG/24HR IUD 1 each by Intrauterine route once   • [DISCONTINUED] naproxen (Naprosyn) 500 mg tablet Take 1 tablet (500 mg total) by mouth 2 (two) times a day with meals       Objective     /70 (BP Location: Left arm, Patient Position: Sitting, Cuff Size: Standard)   Pulse 72   Temp 98 °F (36 7 °C) (Tympanic)   Resp 16   Ht 5' 8" (1 727 m)   Wt 87 1 kg (192 lb)   SpO2 100%   BMI 29 19 kg/m²     Physical Exam  Vitals and nursing note reviewed  Constitutional:       Appearance: Normal appearance  HENT:      Head: Normocephalic and atraumatic  Cardiovascular:      Rate and Rhythm: Normal rate and regular rhythm  Pulses: Normal pulses  Heart sounds: Normal heart sounds  Pulmonary:      Effort: Pulmonary effort is normal       Breath sounds: Normal breath sounds  Musculoskeletal:      Right knee: No LCL laxity, MCL laxity, ACL laxity or PCL laxity  Normal pulse  Instability Tests: Anterior drawer test negative  Posterior drawer test negative  Anterior Lachman test negative  Medial Catarino test negative and lateral Catarino test negative  Left knee: Normal range of motion  No tenderness  No LCL laxity, MCL laxity, ACL laxity or PCL laxity  Normal pulse  Instability Tests: Anterior drawer test negative  Posterior drawer test negative  Anterior Lachman test negative  Medial Catarino test negative and lateral Catarino test negative  Legs:       Comments: Positive pain with valgus stress test of L knee   Neurological:      Mental Status: She is alert and oriented to person, place, and time  Mental status is at baseline         Inessa Turpin PA-C

## 2023-04-24 ENCOUNTER — TELEPHONE (OUTPATIENT)
Dept: FAMILY MEDICINE CLINIC | Facility: CLINIC | Age: 33
End: 2023-04-24

## 2023-04-26 DIAGNOSIS — M25.562 ACUTE PAIN OF LEFT KNEE: ICD-10-CM

## 2023-04-26 RX ORDER — NAPROXEN 500 MG/1
500 TABLET ORAL 2 TIMES DAILY WITH MEALS
Qty: 60 TABLET | Refills: 0 | Status: SHIPPED | OUTPATIENT
Start: 2023-04-26 | End: 2023-08-18

## 2023-08-18 ENCOUNTER — APPOINTMENT (OUTPATIENT)
Dept: LAB | Facility: CLINIC | Age: 33
End: 2023-08-18
Payer: COMMERCIAL

## 2023-08-18 ENCOUNTER — ANNUAL EXAM (OUTPATIENT)
Dept: OBGYN CLINIC | Facility: CLINIC | Age: 33
End: 2023-08-18

## 2023-08-18 VITALS
HEART RATE: 78 BPM | BODY MASS INDEX: 29.19 KG/M2 | DIASTOLIC BLOOD PRESSURE: 75 MMHG | HEIGHT: 68 IN | SYSTOLIC BLOOD PRESSURE: 112 MMHG | WEIGHT: 192.6 LBS

## 2023-08-18 DIAGNOSIS — Z12.4 SCREENING FOR CERVICAL CANCER: ICD-10-CM

## 2023-08-18 DIAGNOSIS — Z11.3 SCREEN FOR STD (SEXUALLY TRANSMITTED DISEASE): ICD-10-CM

## 2023-08-18 DIAGNOSIS — Z11.51 SCREENING FOR HPV (HUMAN PAPILLOMAVIRUS): ICD-10-CM

## 2023-08-18 DIAGNOSIS — Z12.39 ENCOUNTER FOR BREAST CANCER SCREENING USING NON-MAMMOGRAM MODALITY: ICD-10-CM

## 2023-08-18 DIAGNOSIS — Z01.419 ENCOUNTER FOR GYNECOLOGICAL EXAMINATION WITHOUT ABNORMAL FINDING: Primary | ICD-10-CM

## 2023-08-18 PROBLEM — H60.549 ECZEMATOID OTITIS EXTERNA: Status: RESOLVED | Noted: 2022-11-11 | Resolved: 2023-08-18

## 2023-08-18 PROBLEM — E66.3 OVERWEIGHT (BMI 25.0-29.9): Status: RESOLVED | Noted: 2021-10-22 | Resolved: 2023-08-18

## 2023-08-18 LAB
HBV SURFACE AG SER QL: NORMAL
HCV AB SER QL: NORMAL
HIV 1+2 AB+HIV1 P24 AG SERPL QL IA: NORMAL
HIV 2 AB SERPL QL IA: NORMAL
HIV1 AB SERPL QL IA: NORMAL
HIV1 P24 AG SERPL QL IA: NORMAL
TREPONEMA PALLIDUM IGG+IGM AB [PRESENCE] IN SERUM OR PLASMA BY IMMUNOASSAY: NORMAL

## 2023-08-18 PROCEDURE — 87591 N.GONORRHOEAE DNA AMP PROB: CPT | Performed by: NURSE PRACTITIONER

## 2023-08-18 PROCEDURE — 87491 CHLMYD TRACH DNA AMP PROBE: CPT | Performed by: NURSE PRACTITIONER

## 2023-08-18 PROCEDURE — 87340 HEPATITIS B SURFACE AG IA: CPT

## 2023-08-18 PROCEDURE — 36415 COLL VENOUS BLD VENIPUNCTURE: CPT

## 2023-08-18 PROCEDURE — G0145 SCR C/V CYTO,THINLAYER,RESCR: HCPCS | Performed by: NURSE PRACTITIONER

## 2023-08-18 PROCEDURE — 86803 HEPATITIS C AB TEST: CPT

## 2023-08-18 PROCEDURE — G0476 HPV COMBO ASSAY CA SCREEN: HCPCS | Performed by: NURSE PRACTITIONER

## 2023-08-18 PROCEDURE — S0612 ANNUAL GYNECOLOGICAL EXAMINA: HCPCS | Performed by: NURSE PRACTITIONER

## 2023-08-18 PROCEDURE — 87389 HIV-1 AG W/HIV-1&-2 AB AG IA: CPT

## 2023-08-18 PROCEDURE — 86780 TREPONEMA PALLIDUM: CPT

## 2023-08-18 NOTE — LETTER
2023    To Lou Phelps  : 1990      This letter is to advise you that your recent PAP SMEAR results were reviewed by me and are NORMAL.   We will see you in 1 year for your annual exam.    Cecille Alberto

## 2023-08-18 NOTE — PROGRESS NOTES
ANNUAL GYNECOLOGICAL EXAMINATION    Doyle Sarmiento is a 28 y.o. female who presents today for annual GYN exam.  Her last pap smear was performed 7/10/2020 and result was NILM. She reports no history of abnormal pap smears in her past.  She reports menses as regular. Her general medical history has been reviewed and she reports it as follows:    Past Medical History:   Diagnosis Date   • Malaria    • Urogenital trichomoniasis      Past Surgical History:   Procedure Laterality Date   • WISDOM TOOTH EXTRACTION       OB History        1    Para   1    Term   1       0    AB   0    Living   1       SAB   0    IAB   0    Ectopic   0    Multiple   0    Live Births   1               Social History     Tobacco Use   • Smoking status: Never   • Smokeless tobacco: Never   Vaping Use   • Vaping Use: Never used   Substance Use Topics   • Alcohol use: Yes     Comment: couple times/month   • Drug use: Never     Social History     Substance and Sexual Activity   Sexual Activity Yes   • Partners: Male   • Birth control/protection: I.U.D. Cancer-related family history is negative for Breast cancer, Colon cancer, Ovarian cancer, and Cancer. Current Outpatient Medications   Medication Instructions   • fluocinolone acetonide (DERMOTIC) 0.01 % otic oil 5 drops, Left Ear, 2 times daily   • levonorgestrel (MIRENA) 20 MCG/24HR IUD 1 each, Intrauterine, Once       Review of Systems:  Review of Systems   Constitutional: Negative. Gastrointestinal: Negative. Genitourinary: Negative for difficulty urinating, menstrual problem, pelvic pain and vaginal discharge. Skin: Negative. Physical Exam:  /75   Pulse 78   Ht 5' 8" (1.727 m)   Wt 87.4 kg (192 lb 9.6 oz)   LMP  (LMP Unknown)   BMI 29.28 kg/m²   Physical Exam  Constitutional:       General: She is not in acute distress. Appearance: She is well-developed. Genitourinary:      Vulva normal.      No lesions in the vagina. Right Adnexa: not tender and no mass present. Left Adnexa: not tender and no mass present. No cervical motion tenderness or lesion. IUD strings visualized. Uterus is not tender. Breasts:     Right: No mass, nipple discharge, skin change or tenderness. Left: No mass, nipple discharge, skin change or tenderness. Neck:      Thyroid: No thyromegaly. Cardiovascular:      Rate and Rhythm: Normal rate and regular rhythm. Pulmonary:      Effort: Pulmonary effort is normal.   Abdominal:      Palpations: Abdomen is soft. Tenderness: There is no abdominal tenderness. Musculoskeletal:      Cervical back: Neck supple. Neurological:      Mental Status: She is alert and oriented to person, place, and time. Skin:     General: Skin is warm and dry. Vitals reviewed. Assessment/Plan:   1. Normal well-woman GYN exam.  2. Cervical cancer screening:  Normal cervical exam.  Pap smear done with HPV co-testing. Has not received HPV vaccine in the past.  Offered vaccine series to patient but she declines at this time. 3. STD screening:  Orders placed for vaginal GC/CT cultures. Orders placed for serum anti-HIV, anti-HCV, HbsAg, syphilis panel. 4. Breast cancer screening:  Normal breast exam.  Reviewed breast self-awareness. 5. Depression Screening: Patient's depression screening was assessed with a PHQ-2 score of 0. Their PHQ-9 score was 0. Clinically patient does not have depression. No treatment is required. 6. BMI Counseling: Body mass index is 29.28 kg/m². No intervention indicated. 7. Contraception:  Mirena IUD. 8. Return to office in 1 year for annual GYN exam.    Reviewed with patient that test results are available in [address] [place] [address] immediately, but that they will not necessarily be reviewed by me immediately. Explained that I will review results at my earliest opportunity and contact patient appropriately.

## 2023-08-18 NOTE — PATIENT INSTRUCTIONS
Thank you for your confidence in our team.   We appreciate you and welcome your feedback. If you receive a survey from us, please take a few moments to let us know how we are doing.    Sincerely,  Emiliano Dumont

## 2023-08-18 NOTE — LETTER
2023    To Dana Phelps  : 1990      This letter is to advise you that your recent CULTURES for gonorrhea and chlamydia were reviewed by me and are NORMAL. Please contact the office for an appointment if you have any additional concerns.     [address] [facility] Ana Mosley

## 2023-08-18 NOTE — LETTER
2023    To Aman Phelps  : 1990      This letter is to advise you that your recent BLOODWORK for Sexually-Transmitted Diseases (HIV, hepatitis B, hepatitis C, and syphilis) results were reviewed by me and are NORMAL. Please contact our office for an appointment if you have any additional concerns.     [address] [place] [address] Rita Saucedo

## 2023-08-21 LAB
HPV HR 12 DNA CVX QL NAA+PROBE: NEGATIVE
HPV16 DNA CVX QL NAA+PROBE: NEGATIVE
HPV18 DNA CVX QL NAA+PROBE: NEGATIVE

## 2023-08-22 LAB
C TRACH DNA SPEC QL NAA+PROBE: NEGATIVE
N GONORRHOEA DNA SPEC QL NAA+PROBE: NEGATIVE

## 2023-08-25 LAB
LAB AP GYN PRIMARY INTERPRETATION: NORMAL
Lab: NORMAL

## 2023-09-07 ENCOUNTER — OFFICE VISIT (OUTPATIENT)
Dept: OBGYN CLINIC | Facility: CLINIC | Age: 33
End: 2023-09-07

## 2023-09-07 VITALS
WEIGHT: 194.8 LBS | BODY MASS INDEX: 29.52 KG/M2 | HEART RATE: 67 BPM | SYSTOLIC BLOOD PRESSURE: 118 MMHG | DIASTOLIC BLOOD PRESSURE: 79 MMHG | HEIGHT: 68 IN

## 2023-09-07 DIAGNOSIS — Z30.432 ENCOUNTER FOR IUD REMOVAL: Primary | ICD-10-CM

## 2023-09-07 PROCEDURE — 58301 REMOVE INTRAUTERINE DEVICE: CPT | Performed by: NURSE PRACTITIONER

## 2023-09-07 PROCEDURE — 99213 OFFICE O/P EST LOW 20 MIN: CPT | Performed by: NURSE PRACTITIONER

## 2023-09-07 NOTE — PROGRESS NOTES
PROBLEM GYNECOLOGICAL VISIT    Henrique Mazariegos is a 28 y.o. female who presents today with desire for removal of IUD. Her general medical history has been reviewed and she reports it as follows:    Past Medical History:   Diagnosis Date   • Malaria    • Urogenital trichomoniasis      Past Surgical History:   Procedure Laterality Date   • WISDOM TOOTH EXTRACTION  2017     OB History        1    Para   1    Term   1       0    AB   0    Living   1       SAB   0    IAB   0    Ectopic   0    Multiple   0    Live Births   1               Social History     Tobacco Use   • Smoking status: Never   • Smokeless tobacco: Never   Vaping Use   • Vaping Use: Never used   Substance Use Topics   • Alcohol use: Yes     Comment: couple times/month   • Drug use: Never     Social History     Substance and Sexual Activity   Sexual Activity Yes   • Partners: Male   • Birth control/protection: I.U.D. Current Outpatient Medications   Medication Instructions   • fluocinolone acetonide (DERMOTIC) 0.01 % otic oil 5 drops, Left Ear, 2 times daily   • levonorgestrel (MIRENA) 20 MCG/24HR IUD 1 each, Intrauterine, Once       History of Present Illness:   States that she has recently been having issues with her hair thinning. She believes this is due to her Mirena IUD and desires for IUD to be removed. She does not desire pregnancy but does not wish to use any contraception other than condoms. Review of Systems:  Review of Systems   Constitutional:        Thinning hair   Genitourinary: Negative. Physical Exam:  /79   Pulse 67   Ht 5' 8" (1.727 m)   Wt 88.4 kg (194 lb 12.8 oz)   LMP  (LMP Unknown)   BMI 29.62 kg/m²   Physical Exam  Constitutional:       General: She is not in acute distress. Neurological:      Mental Status: She is alert. Skin:     General: Skin is warm and dry. Vitals reviewed. Assessment:   1. Desires removal of IUD    Plan:   1.  Discussed with patient that it is unlikely that Mirena IUD is the cause of her thinning hair, but she is adamant that she wants IUD removed. 2. Mirena IUD removed without issue. 3. Return to office as previously scheduled.

## 2023-09-07 NOTE — PROGRESS NOTES
Iud removal    Date/Time: 9/7/2023 6:00 PM    Performed by: Ivy Kurtz [address] [place] [address] by: BRITNEY Mills  Universal Protocol:  Consent: Verbal consent obtained. Risks and benefits: risks, benefits and alternatives were discussed  Consent given by: patient  Time out: Immediately prior to procedure a "time out" was called to verify the correct patient, procedure, equipment, support staff and site/side marked as required.   Patient understanding: patient states understanding of the procedure being performed  Required items: required blood products, implants, devices, and special equipment available  Patient identity confirmed: verbally with patient      Procedure:     Removed with no complications: yes

## 2023-11-17 ENCOUNTER — OFFICE VISIT (OUTPATIENT)
Dept: FAMILY MEDICINE CLINIC | Facility: CLINIC | Age: 33
End: 2023-11-17
Payer: COMMERCIAL

## 2023-11-17 VITALS
HEART RATE: 66 BPM | OXYGEN SATURATION: 98 % | HEIGHT: 68 IN | BODY MASS INDEX: 29.52 KG/M2 | TEMPERATURE: 99.3 F | WEIGHT: 194.8 LBS | RESPIRATION RATE: 17 BRPM | SYSTOLIC BLOOD PRESSURE: 121 MMHG | DIASTOLIC BLOOD PRESSURE: 75 MMHG

## 2023-11-17 DIAGNOSIS — Z00.00 ANNUAL PHYSICAL EXAM: Primary | ICD-10-CM

## 2023-11-17 DIAGNOSIS — E66.3 OVERWEIGHT (BMI 25.0-29.9): ICD-10-CM

## 2023-11-17 PROCEDURE — 99395 PREV VISIT EST AGE 18-39: CPT | Performed by: PHYSICIAN ASSISTANT

## 2023-11-17 NOTE — PROGRESS NOTES
ADULT ANNUAL [address] [facility] PRIMARY CARE [facility]    NAME: Mel Reddy  AGE: 35 y.o. SEX: female  : 1990     DATE: 2023     Assessment and Plan:     Problem List Items Addressed This Visit        Other    Overweight (BMI 25.0-29. 9)     BMI Counseling: Body mass index is 29.62 kg/m². The BMI is above normal. Nutrition recommendations include reducing portion sizes, decreasing overall calorie intake, 3-5 servings of fruits/vegetables daily, reducing fast food intake, and consuming healthier snacks. Exercise recommendations include exercising 3-5 times per week and strength training exercises. Other Visit Diagnoses     Annual physical exam    -  Primary          Immunizations and preventive care screenings were discussed with patient today. Appropriate education was printed on patient's after visit summary. Counseling:  Alcohol/drug use: discussed moderation in alcohol intake, the recommendations for healthy alcohol use, and avoidance of illicit drug use. Dental Health: discussed importance of regular tooth brushing, flossing, and dental visits. Injury prevention: discussed safety/seat belts, safety helmets, smoke detectors, carbon dioxide detectors, and smoking near bedding or upholstery. Sexual health: discussed sexually transmitted diseases, partner selection, use of condoms, avoidance of unintended pregnancy, and contraceptive alternatives. Exercise: the importance of regular exercise/physical activity was discussed. Recommend exercise 3-5 times per week for at least 30 minutes. Depression Screening and Follow-up Plan: Patient was screened for depression during today's encounter. They screened negative with a PHQ-2 score of 0. Return in about 1 year (around 2024).      Chief Complaint:     Chief Complaint   Patient presents with   • Physical Exam      History of Present Illness:     Adult Annual Physical Patient here for a comprehensive physical exam. The patient reports no problems. Work physical for [employer] Took IUD out because was losing hair back of head and wanted to see if that was cause. 50/50 about having another child, not sure yet. Open to it. No smoking. Rare ETOH use. Feeling well, prefers to wait next year to repeat labs. Diet and Physical Activity  Diet/Nutrition: well balanced diet. Exercise: 3-4 times a week on average. Home work outs      Depression Screening  PHQ-2/9 Depression Screening    Little interest or pleasure in doing things: 0 - not at all  Feeling down, depressed, or hopeless: 0 - not at all  PHQ-2 Score: 0  PHQ-2 Interpretation: Negative depression screen       General Health  Sleep: sleeps well. Hearing: normal - bilateral.  Vision: no vision problems. Dental: regular dental visits and brushes teeth twice daily. /GYN Health  Follows with gynecology? yes   Last menstrual period: today started   Contraceptive method:  none . History of STDs?: no.     Advanced Care Planning  Do you have an advanced directive? no  Do you have a durable medical power of ? no     Review of Systems:     Review of Systems   Constitutional:  Negative for chills and fever. HENT:  Negative for ear pain and sore throat. Eyes:  Negative for pain and visual disturbance. Respiratory:  Negative for cough and shortness of breath. Cardiovascular:  Negative for chest pain and palpitations. Gastrointestinal:  Negative for abdominal pain and vomiting. Genitourinary:  Negative for dysuria and hematuria. Musculoskeletal:  Negative for arthralgias and back pain. Skin:  Negative for color change and rash. Neurological:  Negative for seizures and syncope. All other systems reviewed and are negative.      Past Medical History:     Past Medical History:   Diagnosis Date   • Malaria 2021   • Urogenital trichomoniasis 2020      Past Surgical History:     Past Surgical History: Procedure Laterality Date   • WISDOM TOOTH EXTRACTION  2017      Social History:     Social History     Socioeconomic History   • Marital status: Single     Spouse name: None   • Number of children: None   • Years of education: None   • Highest education level: None   Occupational History   • None   Tobacco Use   • Smoking status: Never   • Smokeless tobacco: Never   Vaping Use   • Vaping Use: Never used   Substance and Sexual Activity   • Alcohol use: Yes     Comment: couple times/month   • Drug use: Never   • Sexual activity: Yes     Partners: Male     Birth control/protection: I.U.D. Other Topics Concern   • None   Social History Narrative   • None     Social Determinants of Health     Financial Resource Strain: Low Risk  (8/18/2023)    Overall Financial Resource Strain (CARDIA)    • Difficulty of Paying Living Expenses: Not hard at all   Food Insecurity: No Food Insecurity (8/18/2023)    Hunger Vital Sign    • Worried About Running Out of Food in the Last Year: Never true    • Ran Out of Food in the Last Year: Never true   Transportation Needs: No Transportation Needs (8/18/2023)    PRAPARE - Transportation    • Lack of Transportation (Medical): No    • Lack of Transportation (Non-Medical):  No   Physical Activity: Not on file   Stress: Not on file   Social Connections: Not on file   Intimate Partner Violence: Not on file   Housing Stability: Low Risk  (3/22/2022)    Housing Stability Vital Sign    • Unable to Pay for Housing in the Last Year: No    • Number of Places Lived in the Last Year: 1    • Unstable Housing in the Last Year: No      Family History:     Family History   Problem Relation Age of Onset   • Hypertension Mother    • Diabetes Father    • Hypertension Sister    • Breast cancer Neg Hx    • Colon cancer Neg Hx    • Ovarian cancer Neg Hx    • Cancer Neg Hx       Current Medications:     Current Outpatient Medications   Medication Sig Dispense Refill   • fluocinolone acetonide (DERMOTIC) 0.01 % otic oil Administer 5 drops into the left ear 2 (two) times a day 20 mL 0     No current facility-administered medications for this visit. Allergies:     No Known Allergies   Physical Exam:     /75 (BP Location: Left arm, Patient Position: Sitting, Cuff Size: Standard)   Pulse 66   Temp 99.3 °F (37.4 °C) (Tympanic)   Resp 17   Ht 5' 8" (1.727 m)   Wt 88.4 kg (194 lb 12.8 oz)   SpO2 98%   BMI 29.62 kg/m²     Physical Exam  Vitals and nursing note reviewed. Constitutional:       General: She is not in acute distress. Appearance: She is well-developed. HENT:      Head: Normocephalic and atraumatic. Right Ear: Tympanic membrane, ear canal and external ear normal.      Left Ear: Tympanic membrane, ear canal and external ear normal.   Eyes:      Extraocular Movements: Extraocular movements intact. Conjunctiva/sclera: Conjunctivae normal.      Pupils: Pupils are equal, round, and reactive to light. Cardiovascular:      Rate and Rhythm: Normal rate and regular rhythm. Heart sounds: No murmur heard. Pulmonary:      Effort: Pulmonary effort is normal. No respiratory distress. Breath sounds: Normal breath sounds. Abdominal:      Palpations: Abdomen is soft. Tenderness: There is no abdominal tenderness. Musculoskeletal:         General: No swelling. Cervical back: Neck supple. Skin:     General: Skin is warm and dry. Capillary Refill: Capillary refill takes less than 2 seconds. Neurological:      Mental Status: She is alert.    Psychiatric:         Mood and Affect: Mood normal.          Mily Collins PA-C   [address]

## 2023-11-17 NOTE — ASSESSMENT & PLAN NOTE
BMI Counseling: Body mass index is 29.62 kg/m². The BMI is above normal. Nutrition recommendations include reducing portion sizes, decreasing overall calorie intake, 3-5 servings of fruits/vegetables daily, reducing fast food intake, and consuming healthier snacks. Exercise recommendations include exercising 3-5 times per week and strength training exercises.

## 2024-05-08 ENCOUNTER — TELEPHONE (OUTPATIENT)
Age: 34
End: 2024-05-08

## 2024-05-08 NOTE — TELEPHONE ENCOUNTER
Patient called to request a consult for lipo on her back.  I offered next available on 5/22.  Patient said she is going to check w/ her supervisor to see when the best time to take off would be and call us back

## 2024-08-23 ENCOUNTER — ANNUAL EXAM (OUTPATIENT)
Dept: OBGYN CLINIC | Facility: CLINIC | Age: 34
End: 2024-08-23

## 2024-08-23 VITALS
BODY MASS INDEX: 27.89 KG/M2 | SYSTOLIC BLOOD PRESSURE: 117 MMHG | HEART RATE: 69 BPM | WEIGHT: 183.4 LBS | DIASTOLIC BLOOD PRESSURE: 78 MMHG

## 2024-08-23 DIAGNOSIS — Z11.3 SCREEN FOR STD (SEXUALLY TRANSMITTED DISEASE): ICD-10-CM

## 2024-08-23 DIAGNOSIS — Z01.419 ENCOUNTER FOR GYNECOLOGICAL EXAMINATION WITHOUT ABNORMAL FINDING: Primary | ICD-10-CM

## 2024-08-23 DIAGNOSIS — Z12.4 SCREENING FOR CERVICAL CANCER: ICD-10-CM

## 2024-08-23 DIAGNOSIS — Z12.39 ENCOUNTER FOR BREAST CANCER SCREENING USING NON-MAMMOGRAM MODALITY: ICD-10-CM

## 2024-08-23 PROBLEM — E66.3 OVERWEIGHT (BMI 25.0-29.9): Status: RESOLVED | Noted: 2021-10-22 | Resolved: 2024-08-23

## 2024-08-23 PROCEDURE — 87591 N.GONORRHOEAE DNA AMP PROB: CPT | Performed by: NURSE PRACTITIONER

## 2024-08-23 PROCEDURE — 87491 CHLMYD TRACH DNA AMP PROBE: CPT | Performed by: NURSE PRACTITIONER

## 2024-08-23 PROCEDURE — 99395 PREV VISIT EST AGE 18-39: CPT | Performed by: NURSE PRACTITIONER

## 2024-08-23 NOTE — PROGRESS NOTES
ANNUAL GYNECOLOGICAL EXAMINATION    Herlinda Phelps is a 33 y.o. female who presents today for annual GYN exam.  Her last pap smear was performed 2023 and result was NILM with negative HPV.  She reports no history of abnormal pap smears in her past.  She had HIV screening performed 2023 and it was negative.  She reports menses as regular.  Patient's last menstrual period was 08/15/2024 (approximate).  Her general medical history has been reviewed and she reports it as follows:    Past Medical History:   Diagnosis Date    Malaria     Urogenital trichomoniasis      Past Surgical History:   Procedure Laterality Date    WISDOM TOOTH EXTRACTION       OB History          1    Para   1    Term   1       0    AB   0    Living   1         SAB   0    IAB   0    Ectopic   0    Multiple   0    Live Births   1               Social History     Tobacco Use    Smoking status: Never    Smokeless tobacco: Never   Vaping Use    Vaping status: Never Used   Substance Use Topics    Alcohol use: Yes     Comment: couple times/month    Drug use: Never     Social History     Substance and Sexual Activity   Sexual Activity Yes    Partners: Male    Birth control/protection: Condom Male     Cancer-related family history is negative for Breast cancer, Colon cancer, Ovarian cancer, and Cancer.    Current Outpatient Medications   Medication Instructions    fluocinolone acetonide (DERMOTIC) 0.01 % otic oil 5 drops, Left Ear, 2 times daily       Review of Systems:  Review of Systems   Constitutional: Negative.    Gastrointestinal: Negative.    Genitourinary:  Negative for difficulty urinating, menstrual problem, pelvic pain and vaginal discharge.   Skin: Negative.        Physical Exam:  /78 (BP Location: Right arm, Patient Position: Sitting, Cuff Size: Standard)   Pulse 69   Wt 83.2 kg (183 lb 6.4 oz)   LMP 08/15/2024 (Approximate)   Breastfeeding No   BMI 27.89 kg/m²   Physical Exam  Constitutional:        General: She is not in acute distress.     Appearance: She is well-developed.   Genitourinary:      Vulva normal.      No lesions in the vagina.        Right Adnexa: not tender and no mass present.     Left Adnexa: not tender and no mass present.     No cervical motion tenderness or lesion.      Uterus is not tender.   Breasts:     Right: No mass, nipple discharge, skin change or tenderness.      Left: No mass, nipple discharge, skin change or tenderness.   Neck:      Thyroid: No thyromegaly.   Cardiovascular:      Rate and Rhythm: Normal rate and regular rhythm.   Pulmonary:      Effort: Pulmonary effort is normal.   Abdominal:      Palpations: Abdomen is soft.      Tenderness: There is no abdominal tenderness.   Musculoskeletal:      Cervical back: Neck supple.   Neurological:      Mental Status: She is alert and oriented to person, place, and time.   Skin:     General: Skin is warm and dry.   Vitals reviewed.       Assessment/Plan:   1. Normal well-woman GYN exam.  2. Cervical cancer screening:  Normal cervical exam.  Pap smear not indicated at this time.  Has not received HPV vaccine in the past.  Offered vaccine series to patient now and she declines.     3. STD screening:  Orders placed for vaginal GC/CT cultures.  Orders placed for serum anti-HIV, anti-HCV, HbsAg, syphilis panel.   4. Breast cancer screening:  Normal breast exam.  Reviewed breast self-awareness.   5. Depression Screening: Patient's depression screening was assessed with a PHQ-2 score of 0. Their PHQ-9 score was 0. Clinically patient does not have depression. No treatment is required.    6. BMI Counseling: Body mass index is 27.89 kg/m².  No intervention indicated.   7. Contraception:  Declines other than condoms.   8. Return to office in 1 year for annual GYN exam.    Reviewed with patient that test results are [facility] immediately, but that they will not necessarily be reviewed by me immediately.  Explained that I will review  results at my earliest opportunity and contact patient appropriately.

## 2024-08-23 NOTE — PATIENT INSTRUCTIONS
Thank you for your confidence in our team.   We appreciate you and welcome your feedback.   If you receive a survey from us, please take a few moments to let us know how we are doing.   Sincerely,  BRITNEY Brown

## 2024-08-26 ENCOUNTER — APPOINTMENT (OUTPATIENT)
Dept: LAB | Facility: CLINIC | Age: 34
End: 2024-08-26
Payer: COMMERCIAL

## 2024-08-26 DIAGNOSIS — Z01.812 PRE-OPERATIVE LABORATORY EXAMINATION: ICD-10-CM

## 2024-08-26 DIAGNOSIS — Z11.3 SCREEN FOR STD (SEXUALLY TRANSMITTED DISEASE): ICD-10-CM

## 2024-08-26 LAB
ALBUMIN SERPL BCG-MCNC: 4 G/DL (ref 3.5–5)
ALP SERPL-CCNC: 82 U/L (ref 34–104)
ALT SERPL W P-5'-P-CCNC: 8 U/L (ref 7–52)
ANION GAP SERPL CALCULATED.3IONS-SCNC: 8 MMOL/L (ref 4–13)
APTT PPP: 26 SECONDS (ref 23–34)
AST SERPL W P-5'-P-CCNC: 13 U/L (ref 13–39)
BASOPHILS # BLD AUTO: 0.04 THOUSANDS/ÂΜL (ref 0–0.1)
BASOPHILS NFR BLD AUTO: 1 % (ref 0–1)
BILIRUB SERPL-MCNC: 0.3 MG/DL (ref 0.2–1)
BUN SERPL-MCNC: 5 MG/DL (ref 5–25)
C TRACH DNA SPEC QL NAA+PROBE: NEGATIVE
CALCIUM SERPL-MCNC: 8.9 MG/DL (ref 8.4–10.2)
CHLORIDE SERPL-SCNC: 103 MMOL/L (ref 96–108)
CO2 SERPL-SCNC: 25 MMOL/L (ref 21–32)
CREAT SERPL-MCNC: 1.06 MG/DL (ref 0.6–1.3)
EOSINOPHIL # BLD AUTO: 0.06 THOUSAND/ÂΜL (ref 0–0.61)
EOSINOPHIL NFR BLD AUTO: 1 % (ref 0–6)
ERYTHROCYTE [DISTWIDTH] IN BLOOD BY AUTOMATED COUNT: 12.2 % (ref 11.6–15.1)
GFR SERPL CREATININE-BSD FRML MDRD: 69 ML/MIN/1.73SQ M
GLUCOSE P FAST SERPL-MCNC: 86 MG/DL (ref 65–99)
HCT VFR BLD AUTO: 43.7 % (ref 34.8–46.1)
HGB BLD-MCNC: 13.9 G/DL (ref 11.5–15.4)
HIV 1+2 AB+HIV1 P24 AG SERPL QL IA: NORMAL
HIV 2 AB SERPL QL IA: NORMAL
HIV1 AB SERPL QL IA: NORMAL
HIV1 P24 AG SERPL QL IA: NORMAL
IMM GRANULOCYTES # BLD AUTO: 0.01 THOUSAND/UL (ref 0–0.2)
IMM GRANULOCYTES NFR BLD AUTO: 0 % (ref 0–2)
INR PPP: 1.1 (ref 0.85–1.19)
LYMPHOCYTES # BLD AUTO: 1.64 THOUSANDS/ÂΜL (ref 0.6–4.47)
LYMPHOCYTES NFR BLD AUTO: 30 % (ref 14–44)
MCH RBC QN AUTO: 27.6 PG (ref 26.8–34.3)
MCHC RBC AUTO-ENTMCNC: 31.8 G/DL (ref 31.4–37.4)
MCV RBC AUTO: 87 FL (ref 82–98)
MONOCYTES # BLD AUTO: 0.4 THOUSAND/ÂΜL (ref 0.17–1.22)
MONOCYTES NFR BLD AUTO: 7 % (ref 4–12)
N GONORRHOEA DNA SPEC QL NAA+PROBE: NEGATIVE
NEUTROPHILS # BLD AUTO: 3.32 THOUSANDS/ÂΜL (ref 1.85–7.62)
NEUTS SEG NFR BLD AUTO: 61 % (ref 43–75)
NRBC BLD AUTO-RTO: 0 /100 WBCS
PLATELET # BLD AUTO: 263 THOUSANDS/UL (ref 149–390)
PMV BLD AUTO: 10.8 FL (ref 8.9–12.7)
POTASSIUM SERPL-SCNC: 4.5 MMOL/L (ref 3.5–5.3)
PROT SERPL-MCNC: 7.5 G/DL (ref 6.4–8.4)
PROTHROMBIN TIME: 14.5 SECONDS (ref 12.3–15)
RBC # BLD AUTO: 5.03 MILLION/UL (ref 3.81–5.12)
SODIUM SERPL-SCNC: 136 MMOL/L (ref 135–147)
TREPONEMA PALLIDUM IGG+IGM AB [PRESENCE] IN SERUM OR PLASMA BY IMMUNOASSAY: NORMAL
WBC # BLD AUTO: 5.47 THOUSAND/UL (ref 4.31–10.16)

## 2024-08-26 PROCEDURE — 85025 COMPLETE CBC W/AUTO DIFF WBC: CPT

## 2024-08-26 PROCEDURE — 87389 HIV-1 AG W/HIV-1&-2 AB AG IA: CPT

## 2024-08-26 PROCEDURE — 85610 PROTHROMBIN TIME: CPT

## 2024-08-26 PROCEDURE — 87340 HEPATITIS B SURFACE AG IA: CPT

## 2024-08-26 PROCEDURE — 85730 THROMBOPLASTIN TIME PARTIAL: CPT

## 2024-08-26 PROCEDURE — 86803 HEPATITIS C AB TEST: CPT

## 2024-08-26 PROCEDURE — 80053 COMPREHEN METABOLIC PANEL: CPT

## 2024-08-26 PROCEDURE — 86780 TREPONEMA PALLIDUM: CPT

## 2024-08-26 PROCEDURE — 36415 COLL VENOUS BLD VENIPUNCTURE: CPT

## 2024-08-27 LAB
HBV SURFACE AG SER QL: NORMAL
HCV AB SER QL: NORMAL

## 2024-09-30 ENCOUNTER — TELEPHONE (OUTPATIENT)
Dept: DERMATOLOGY | Facility: CLINIC | Age: 34
End: 2024-09-30

## 2024-09-30 NOTE — TELEPHONE ENCOUNTER
"Called patient to advise her appt with Dr. Messer on 3/20 needs to be r/s, due to change in provider's schedule. Left a message for patient to call the office to reschedule this appt. Also advised patient to let us know if this is for \"medical\" hair loss diagnosis or if she is looking for cosmetic hair loss treatments (which would be scheduled at the [facility]).  "

## 2024-09-30 NOTE — TELEPHONE ENCOUNTER
Patient returning call and doesn't wish to reschedule at this time.  She will call back if she still needs an apt.

## 2024-11-22 ENCOUNTER — OFFICE VISIT (OUTPATIENT)
Dept: FAMILY MEDICINE CLINIC | Facility: CLINIC | Age: 34
End: 2024-11-22
Payer: COMMERCIAL

## 2024-11-22 VITALS
SYSTOLIC BLOOD PRESSURE: 118 MMHG | WEIGHT: 185.2 LBS | BODY MASS INDEX: 28.07 KG/M2 | HEART RATE: 75 BPM | OXYGEN SATURATION: 98 % | TEMPERATURE: 97.6 F | RESPIRATION RATE: 17 BRPM | HEIGHT: 68 IN | DIASTOLIC BLOOD PRESSURE: 75 MMHG

## 2024-11-22 DIAGNOSIS — E66.3 OVERWEIGHT (BMI 25.0-29.9): ICD-10-CM

## 2024-11-22 DIAGNOSIS — H60.8X3 CHRONIC ECZEMATOUS OTITIS EXTERNA OF BOTH EARS: ICD-10-CM

## 2024-11-22 DIAGNOSIS — Z00.00 ANNUAL PHYSICAL EXAM: Primary | ICD-10-CM

## 2024-11-22 PROCEDURE — 99395 PREV VISIT EST AGE 18-39: CPT | Performed by: PHYSICIAN ASSISTANT

## 2024-11-22 RX ORDER — ONDANSETRON 4 MG/1
TABLET, FILM COATED ORAL
COMMUNITY
Start: 2024-09-08 | End: 2024-11-22 | Stop reason: ALTCHOICE

## 2024-11-22 NOTE — ASSESSMENT & PLAN NOTE
Encouraged diet/exercise. BMI Counseling: Body mass index is 28.16 kg/m². The BMI is above normal. Nutrition recommendations include reducing portion sizes, decreasing overall calorie intake, 3-5 servings of fruits/vegetables daily, reducing fast food intake, consuming healthier snacks, decreasing soda and/or juice intake, moderation in carbohydrate intake, increasing intake of lean protein, reducing intake of saturated fat and trans fat, and reducing intake of cholesterol. Exercise recommendations include exercising 3-5 times per week and strength training exercises.

## 2024-11-22 NOTE — PROGRESS NOTES
Adult Annual Physical  Name: Herlinda Phelps      : 1990      MRN: [id number]  Encounter Provider: Duyen Ignacio PA-C  Encounter Date: 2024   Encounter department: [facility] CARE [facility]    Assessment & Plan  Annual physical exam    Orders:    Lipid Panel with Direct LDL reflex; Future    Basic metabolic panel; Future    Chronic eczematous otitis externa of both ears  Reviewed ENT consult note from 2024. Continue Dermotic otic oil per ENT as needed with improvement.          Overweight (BMI 25.0-29.9)  Encouraged diet/exercise. BMI Counseling: Body mass index is 28.16 kg/m². The BMI is above normal. Nutrition recommendations include reducing portion sizes, decreasing overall calorie intake, 3-5 servings of fruits/vegetables daily, reducing fast food intake, consuming healthier snacks, decreasing soda and/or juice intake, moderation in carbohydrate intake, increasing intake of lean protein, reducing intake of saturated fat and trans fat, and reducing intake of cholesterol. Exercise recommendations include exercising 3-5 times per week and strength training exercises.         Immunizations and preventive care screenings were discussed with patient today. Appropriate education was printed on patient's after visit summary.    Counseling:  Alcohol/drug use: discussed moderation in alcohol intake, the recommendations for healthy alcohol use, and avoidance of illicit drug use.  Dental Health: discussed importance of regular tooth brushing, flossing, and dental visits.  Injury prevention: discussed safety/seat belts, safety helmets, smoke detectors, carbon monoxide detectors, and smoking near bedding or upholstery.  Sexual health: discussed sexually transmitted diseases, partner selection, use of condoms, avoidance of unintended pregnancy, and contraceptive alternatives.  Exercise: the importance of regular exercise/physical activity was discussed. Recommend exercise 3-5 times per  "week for at least 30 minutes.          History of Present Illness     Adult Annual Physical:  Patient presents for annual physical. Herlinda is a 34 y.o. female who presents for routine annual physical without complaints. .     Diet and Physical Activity:  - Diet/Nutrition: well balanced diet.  - Exercise:. 2-3x times a week at home, videos [employer]    Depression Screening:  - PHQ-2 Score: 0    General Health:  - Sleep: sleeps well.  - Hearing: normal hearing bilateral ears.  - Vision: wears glasses and no vision problems.  - Dental: regular dental visits.    /GYN Health:  - Follows with GYN: yes.   - Menopause: premenopausal.   - Last menstrual cycle: 11/9/2024.   - History of STDs: no  - Contraception:. not currently sexually active      Advanced Care Planning:  - Has an advanced directive?: no    - Has a durable medical POA?: no    - ACP document given to patient?: no      Review of Systems   Constitutional:  Negative for chills and fever.   HENT:  Negative for ear pain and sore throat.    Eyes:  Negative for pain and visual disturbance.   Respiratory:  Negative for cough and shortness of breath.    Cardiovascular:  Negative for chest pain and palpitations.   Gastrointestinal:  Negative for abdominal pain and vomiting.   Genitourinary:  Negative for dysuria and hematuria.   Musculoskeletal:  Negative for arthralgias and back pain.   Skin:  Negative for color change and rash.   Neurological:  Negative for seizures and syncope.   All other systems reviewed and are negative.        Objective   /75 (BP Location: Left arm, Patient Position: Sitting, Cuff Size: Standard)   Pulse 75   Temp 97.6 °F (36.4 °C) (Tympanic)   Resp 17   Ht 5' 8\" (1.727 m)   Wt 84 kg (185 lb 3.2 oz)   SpO2 98%   BMI 28.16 kg/m²     Physical Exam  Vitals and nursing note reviewed.   Constitutional:       General: She is not in acute distress.     Appearance: She is well-developed.   HENT:      Head: Normocephalic and atraumatic.      " Right Ear: Tympanic membrane, ear canal and external ear normal.      Left Ear: Tympanic membrane, ear canal and external ear normal.   Eyes:      Conjunctiva/sclera: Conjunctivae normal.   Cardiovascular:      Rate and Rhythm: Normal rate and regular rhythm.      Heart sounds: No murmur heard.  Pulmonary:      Effort: Pulmonary effort is normal. No respiratory distress.      Breath sounds: Normal breath sounds.   Abdominal:      Palpations: Abdomen is soft.      Tenderness: There is no abdominal tenderness.   Musculoskeletal:         General: No swelling.      Cervical back: Neck supple.   Skin:     General: Skin is warm and dry.      Capillary Refill: Capillary refill takes less than 2 seconds.   Neurological:      Mental Status: She is alert.   Psychiatric:         Mood and Affect: Mood normal.

## 2024-11-22 NOTE — ASSESSMENT & PLAN NOTE
Reviewed ENT consult note from 1/2024. Continue Dermotic otic oil per ENT as needed with improvement.

## 2024-11-26 ENCOUNTER — APPOINTMENT (OUTPATIENT)
Dept: LAB | Facility: CLINIC | Age: 34
End: 2024-11-26
Payer: COMMERCIAL

## 2024-11-26 DIAGNOSIS — Z00.00 ANNUAL PHYSICAL EXAM: ICD-10-CM

## 2024-11-26 LAB
ANION GAP SERPL CALCULATED.3IONS-SCNC: 9 MMOL/L (ref 4–13)
BUN SERPL-MCNC: 8 MG/DL (ref 5–25)
CALCIUM SERPL-MCNC: 9 MG/DL (ref 8.4–10.2)
CHLORIDE SERPL-SCNC: 107 MMOL/L (ref 96–108)
CHOLEST SERPL-MCNC: 142 MG/DL (ref ?–200)
CO2 SERPL-SCNC: 24 MMOL/L (ref 21–32)
CREAT SERPL-MCNC: 0.77 MG/DL (ref 0.6–1.3)
GFR SERPL CREATININE-BSD FRML MDRD: 101 ML/MIN/1.73SQ M
GLUCOSE P FAST SERPL-MCNC: 77 MG/DL (ref 65–99)
HDLC SERPL-MCNC: 55 MG/DL
LDLC SERPL CALC-MCNC: 77 MG/DL (ref 0–100)
POTASSIUM SERPL-SCNC: 4.4 MMOL/L (ref 3.5–5.3)
SODIUM SERPL-SCNC: 140 MMOL/L (ref 135–147)
TRIGL SERPL-MCNC: 52 MG/DL (ref ?–150)

## 2024-11-26 PROCEDURE — 80061 LIPID PANEL: CPT

## 2024-11-26 PROCEDURE — 80048 BASIC METABOLIC PNL TOTAL CA: CPT

## 2024-11-26 PROCEDURE — 36415 COLL VENOUS BLD VENIPUNCTURE: CPT

## 2024-11-27 ENCOUNTER — RESULTS FOLLOW-UP (OUTPATIENT)
Dept: FAMILY MEDICINE CLINIC | Facility: CLINIC | Age: 34
End: 2024-11-27

## 2025-03-26 ENCOUNTER — TELEPHONE (OUTPATIENT)
Dept: FAMILY MEDICINE CLINIC | Facility: CLINIC | Age: 35
End: 2025-03-26

## 2025-04-11 ENCOUNTER — OFFICE VISIT (OUTPATIENT)
Dept: FAMILY MEDICINE CLINIC | Facility: CLINIC | Age: 35
End: 2025-04-11
Payer: COMMERCIAL

## 2025-04-11 VITALS
RESPIRATION RATE: 16 BRPM | TEMPERATURE: 97.4 F | OXYGEN SATURATION: 99 % | HEART RATE: 82 BPM | WEIGHT: 193.8 LBS | DIASTOLIC BLOOD PRESSURE: 80 MMHG | SYSTOLIC BLOOD PRESSURE: 110 MMHG | BODY MASS INDEX: 29.47 KG/M2

## 2025-04-11 DIAGNOSIS — E66.3 OVERWEIGHT (BMI 25.0-29.9): ICD-10-CM

## 2025-04-11 DIAGNOSIS — H50.9 STRABISMUS: Primary | ICD-10-CM

## 2025-04-11 DIAGNOSIS — F40.10 SOCIAL ANXIETY DISORDER: ICD-10-CM

## 2025-04-11 PROCEDURE — 99213 OFFICE O/P EST LOW 20 MIN: CPT | Performed by: PHYSICIAN ASSISTANT

## 2025-04-11 NOTE — ASSESSMENT & PLAN NOTE
Intermittent, worse with on camera for work, requesting note for excuse to not use work camera and will be following with ophthalmology   Orders:  •  Ambulatory Referral to Ophthalmology; Future

## 2025-04-11 NOTE — PROGRESS NOTES
Name: Herlinda Phelps      : 1990      MRN: [id number]  Encounter Provider: Duyen Ignacio PA-C  Encounter Date: 2025   Encounter department: [facility] CARE [facility]  :  Assessment & Plan  Strabismus  Intermittent, worse with on camera for work, requesting note for excuse to not use work camera and will be following with ophthalmology   Orders:  •  Ambulatory Referral to Ophthalmology; Future    Social anxiety disorder  Primarily controlled but worsened when requiring to be on camera for meetings, otherwise functioning well at work, note written for accommodations.        Overweight (BMI 25.0-29.9)  Wt Readings from Last 3 Encounters:   25 87.9 kg (193 lb 12.8 oz)   24 84 kg (185 lb 3.2 oz)   24 83.2 kg (183 lb 6.4 oz)     Recommend well-balanced diet and exercise.               History of Present Illness   Herlinda is a 34 y.o. female with a h/o strabismus who presents for work note to allow for her to be excused from being on camera during meetings. She has increased anxiety because she feeling like people will focus on her eye that lags at times, previous surgery for it, has not followed recently, not affecting vision, rare headaches. Doing well, came back from [place] recently.     LMP 3/26/25      Review of Systems   Constitutional:  Negative for chills, fever and unexpected weight change.   Eyes:  Negative for photophobia, pain, discharge, redness, itching and visual disturbance.   Respiratory:  Negative for shortness of breath.    Cardiovascular:  Negative for chest pain.   Psychiatric/Behavioral:  Negative for sleep disturbance and suicidal ideas. The patient is nervous/anxious.        Objective   /80 (BP Location: Left arm, Patient Position: Sitting, Cuff Size: Standard)   Pulse 82   Temp (!) 97.4 °F (36.3 °C) (Tympanic)   Resp 16   Wt 87.9 kg (193 lb 12.8 oz)   LMP 2025 (Exact Date)   SpO2 99%   BMI 29.47 kg/m²      Physical  Exam  Vitals reviewed.   Constitutional:       Appearance: Normal appearance.   HENT:      Head: Normocephalic and atraumatic.   Eyes:      Extraocular Movements: Extraocular movements intact.      Pupils: Pupils are equal, round, and reactive to light.   Cardiovascular:      Rate and Rhythm: Normal rate and regular rhythm.   Pulmonary:      Effort: Pulmonary effort is normal.      Breath sounds: Normal breath sounds.   Neurological:      Mental Status: She is alert and oriented to person, place, and time. Mental status is at baseline.          26-Feb-2022 07:48

## 2025-04-11 NOTE — LETTER
April 11, 2025     Patient: Herlinda Phelps  YOB: 1990  Date of Visit: 4/11/2025      To Whom it May Concern:    Herlinda Phelps is under my professional care. Herlinda was seen in my office on 4/11/2025. Please allow for accomodation and excuse from using virtual camera during meetings as medically necessary.     If you have any questions or concerns, please don't hesitate to call.         Sincerely,          Duyen Ignacio PA-C        CC: No Recipients

## 2025-04-11 NOTE — ASSESSMENT & PLAN NOTE
Primarily controlled but worsened when requiring to be on camera for meetings, otherwise functioning well at work, note written for accommodations.

## 2025-04-11 NOTE — ASSESSMENT & PLAN NOTE
Wt Readings from Last 3 Encounters:   04/11/25 87.9 kg (193 lb 12.8 oz)   11/22/24 84 kg (185 lb 3.2 oz)   08/23/24 83.2 kg (183 lb 6.4 oz)     Recommend well-balanced diet and exercise.